# Patient Record
Sex: MALE | Race: BLACK OR AFRICAN AMERICAN | HISPANIC OR LATINO | Employment: OTHER | URBAN - METROPOLITAN AREA
[De-identification: names, ages, dates, MRNs, and addresses within clinical notes are randomized per-mention and may not be internally consistent; named-entity substitution may affect disease eponyms.]

---

## 2018-12-07 ENCOUNTER — TELEPHONE (OUTPATIENT)
Dept: FAMILY MEDICINE CLINIC | Facility: CLINIC | Age: 63
End: 2018-12-07

## 2018-12-19 ENCOUNTER — OFFICE VISIT (OUTPATIENT)
Dept: FAMILY MEDICINE CLINIC | Facility: CLINIC | Age: 63
End: 2018-12-19
Payer: COMMERCIAL

## 2018-12-19 VITALS
DIASTOLIC BLOOD PRESSURE: 98 MMHG | HEART RATE: 92 BPM | TEMPERATURE: 97.1 F | SYSTOLIC BLOOD PRESSURE: 138 MMHG | RESPIRATION RATE: 16 BRPM | WEIGHT: 165 LBS | OXYGEN SATURATION: 95 %

## 2018-12-19 DIAGNOSIS — Z23 ENCOUNTER FOR VACCINATION: ICD-10-CM

## 2018-12-19 DIAGNOSIS — I10 ESSENTIAL HYPERTENSION: ICD-10-CM

## 2018-12-19 DIAGNOSIS — R21 RASH: ICD-10-CM

## 2018-12-19 DIAGNOSIS — E78.2 MIXED HYPERLIPIDEMIA: ICD-10-CM

## 2018-12-19 DIAGNOSIS — Z72.0 TOBACCO USER: Primary | ICD-10-CM

## 2018-12-19 DIAGNOSIS — Z11.4 ENCOUNTER FOR HIV (HUMAN IMMUNODEFICIENCY VIRUS) TEST: ICD-10-CM

## 2018-12-19 PROBLEM — F14.20 COCAINE DEPENDENCE, CONTINUOUS ABUSE (HCC): Status: RESOLVED | Noted: 2018-02-27 | Resolved: 2018-12-19

## 2018-12-19 PROBLEM — I50.20 SYSTOLIC HEART FAILURE (HCC): Status: ACTIVE | Noted: 2018-02-27

## 2018-12-19 PROBLEM — F14.20 COCAINE DEPENDENCE, CONTINUOUS ABUSE (HCC): Status: ACTIVE | Noted: 2018-02-27

## 2018-12-19 PROCEDURE — 90682 RIV4 VACC RECOMBINANT DNA IM: CPT | Performed by: FAMILY MEDICINE

## 2018-12-19 PROCEDURE — 90715 TDAP VACCINE 7 YRS/> IM: CPT | Performed by: FAMILY MEDICINE

## 2018-12-19 PROCEDURE — 99406 BEHAV CHNG SMOKING 3-10 MIN: CPT | Performed by: FAMILY MEDICINE

## 2018-12-19 PROCEDURE — 90472 IMMUNIZATION ADMIN EACH ADD: CPT | Performed by: FAMILY MEDICINE

## 2018-12-19 PROCEDURE — 90471 IMMUNIZATION ADMIN: CPT | Performed by: FAMILY MEDICINE

## 2018-12-19 PROCEDURE — 99213 OFFICE O/P EST LOW 20 MIN: CPT | Performed by: FAMILY MEDICINE

## 2018-12-19 RX ORDER — NICOTINE 21 MG/24HR
1 PATCH, TRANSDERMAL 24 HOURS TRANSDERMAL EVERY 24 HOURS
Qty: 28 PATCH | Refills: 3 | Status: SHIPPED | OUTPATIENT
Start: 2018-12-19 | End: 2021-02-04

## 2018-12-19 RX ORDER — ATORVASTATIN CALCIUM 40 MG/1
TABLET, FILM COATED ORAL EVERY 24 HOURS
COMMUNITY
Start: 2018-02-27 | End: 2018-12-19 | Stop reason: SDUPTHER

## 2018-12-19 RX ORDER — AMMONIUM LACTATE 12 G/100G
LOTION TOPICAL 2 TIMES DAILY PRN
Qty: 400 G | Refills: 0 | Status: SHIPPED | OUTPATIENT
Start: 2018-12-19 | End: 2019-01-21 | Stop reason: SDUPTHER

## 2018-12-19 RX ORDER — LISINOPRIL 20 MG/1
20 TABLET ORAL DAILY
Qty: 90 TABLET | Refills: 1 | Status: SHIPPED | OUTPATIENT
Start: 2018-12-19 | End: 2019-02-26 | Stop reason: SDUPTHER

## 2018-12-19 RX ORDER — LISINOPRIL 20 MG/1
TABLET ORAL EVERY 24 HOURS
COMMUNITY
Start: 2018-02-27 | End: 2018-12-19 | Stop reason: SDUPTHER

## 2018-12-19 RX ORDER — ATORVASTATIN CALCIUM 40 MG/1
40 TABLET, FILM COATED ORAL DAILY
Qty: 90 TABLET | Refills: 1 | Status: SHIPPED | OUTPATIENT
Start: 2018-12-19 | End: 2019-03-22 | Stop reason: SDUPTHER

## 2018-12-19 NOTE — ASSESSMENT & PLAN NOTE
Patient is ready to cut down on smoking currently smokes 4 cigarettes a day will give patient nicotine patches 14 mg Q 24 hr and nicotine gum to assist patient in quitting smoking  The benefits of quitting smoking were discussed with the patient he is aware

## 2018-12-19 NOTE — ASSESSMENT & PLAN NOTE
As per USPSTF Guidelines will obtain one time screening for HIV with HIV antibodies  Patient is aware and agree with above

## 2018-12-19 NOTE — ASSESSMENT & PLAN NOTE
- the rash consistent with excessive dryness of the skin and excoriations noted most likely due to patient's scratching the skin  - will prescribe ammonium lactate to apply liberally twice daily p r n  Finn Simple

## 2018-12-19 NOTE — ASSESSMENT & PLAN NOTE
Patient's blood pressure is controlled even though patient has not taken medication in 2 weeks  Will cautiously restart lisinopril and patient is to return to the clinic in 1 week for blood pressure check with nursing  Patient's blood pressure goal should be less than 130/80 according to ACC/AHA guidelines considering patient has cardiovascular disease  Patient denies any side effects with medications  Patient educated on the importance of weight loss, and appropriate dieting  Patient admits to be compliant with medications

## 2018-12-19 NOTE — PROGRESS NOTES
Assessment/Plan:    Essential hypertension  Patient's blood pressure is controlled even though patient has not taken medication in 2 weeks  Will cautiously restart lisinopril and patient is to return to the clinic in 1 week for blood pressure check with nursing  Patient's blood pressure goal should be less than 130/80 according to ACC/AHA guidelines considering patient has cardiovascular disease  Patient denies any side effects with medications  Patient educated on the importance of weight loss, and appropriate dieting  Patient admits to be compliant with medications  Mixed hyperlipidemia  Will repeat lipid profile if applicable  Patient is on appropriate anti lipid medication  Discussed and emphasized the importance of diet and exercise  Patient acknowledges that they understood what was discussed  Encounter for HIV (human immunodeficiency virus) test  As per USPSTF Guidelines will obtain one time screening for HIV with HIV antibodies  Patient is aware and agree with above  Encounter for vaccination  Patient received flu vaccine and Tdap today  Tobacco user  Patient is ready to cut down on smoking currently smokes 4 cigarettes a day will give patient nicotine patches 14 mg Q 24 hr and nicotine gum to assist patient in quitting smoking  The benefits of quitting smoking were discussed with the patient he is aware  Rash  - the rash consistent with excessive dryness of the skin and excoriations noted most likely due to patient's scratching the skin  - will prescribe ammonium lactate to apply liberally twice daily juhi r rasta Longoria Diagnoses and all orders for this visit:    Tobacco user  -     nicotine (NICODERM CQ) 14 mg/24hr TD 24 hr patch; Place 1 patch on the skin every 24 hours  -     nicotine polacrilex (NICORETTE) 2 mg gum; Chew 1 each (2 mg total) as needed for smoking cessation    Mixed hyperlipidemia  -     atorvastatin (LIPITOR) 40 mg tablet;  Take 1 tablet (40 mg total) by mouth daily  -     Lipid panel; Future    Essential hypertension  -     aspirin 81 MG tablet; Take 1 tablet (81 mg total) by mouth once for 1 dose  -     lisinopril (ZESTRIL) 20 mg tablet; Take 1 tablet (20 mg total) by mouth daily  -     Basic metabolic panel; Future    Encounter for vaccination  -     PREFERRED: influenza vaccine, [id number], quadrivalent, recombinant, PF, 0 5 mL, for patients 18 yr+ (FLUBLOK)  -     TDAP VACCINE GREATER THAN OR EQUAL TO 6YO IM    Rash  -     ammonium lactate (LAC-HYDRIN) 12 % lotion; Apply topically 2 (two) times a day as needed for dry skin    Encounter for HIV (human immunodeficiency virus) test  -     HIV 1/2 AG-AB combo; Future    Other orders  -     Discontinue: lisinopril (ZESTRIL) 20 mg tablet; every 24 hours  -     Discontinue: atorvastatin (LIPITOR) 40 mg tablet; every 24 hours  -     Discontinue: aspirin 81 MG tablet; every 24 hours          Subjective:      Patient ID: Luara Allison is a 59 y o  male  This is a pleasant 78-year-old male with known past medical history of systolic CHF, hypertension, hyperlipidemia and tobacco abuse who presents to the clinic for sick visit for medication refills  Patient reports he was out of the country in the [facility] for 5 months as his father is very ill  Patient does admit that he ran out of his blood pressure medications several months ago and was taking his sisters blood pressure medication  Since his return to the [place] patient reports that he has not taken any type of blood pressure medication for the last 2 weeks  Patient does state that for the past 3 months he has had a rash on his torso that he describes as itching and makes him scratch a lot  He states that this rash may be due to the type of water he was using to shower when he was away in the [facility]                 Currently denies any chest pain or shortness of breath or leg swelling and would like refills on all of his medications  The following portions of the patient's history were reviewed and updated as appropriate:   He  has no past medical history on file  He   Patient Active Problem List    Diagnosis Date Noted    Encounter for vaccination 12/19/2018    Rash 12/19/2018    Encounter for HIV (human immunodeficiency virus) test 12/19/2018    Essential hypertension 02/27/2018    Mixed hyperlipidemia 02/20/0706    Systolic heart failure ([facility] [address] ) 02/27/2018    Tobacco user 02/27/2018     He  has no past surgical history on file  His family history is not on file  He  reports that he has been smoking  He uses smokeless tobacco  His alcohol and drug histories are not on file  Current Outpatient Prescriptions   Medication Sig Dispense Refill    aspirin 81 MG tablet Take 1 tablet (81 mg total) by mouth once for 1 dose 90 tablet 1    atorvastatin (LIPITOR) 40 mg tablet Take 1 tablet (40 mg total) by mouth daily 90 tablet 1    lisinopril (ZESTRIL) 20 mg tablet Take 1 tablet (20 mg total) by mouth daily 90 tablet 1    ammonium lactate (LAC-HYDRIN) 12 % lotion Apply topically 2 (two) times a day as needed for dry skin 400 g 0    nicotine (NICODERM CQ) 14 mg/24hr TD 24 hr patch Place 1 patch on the skin every 24 hours 28 patch 3    nicotine polacrilex (NICORETTE) 2 mg gum Chew 1 each (2 mg total) as needed for smoking cessation 100 each 1     No current facility-administered medications for this visit       Review of Systems   Constitutional: Negative for fatigue and fever  HENT: Negative for congestion and sore throat  Eyes: Negative for visual disturbance  Respiratory: Negative for cough, shortness of breath and wheezing  Cardiovascular: Negative for chest pain, palpitations and leg swelling  Gastrointestinal: Negative for abdominal pain, anal bleeding, constipation, diarrhea, nausea and vomiting  Endocrine: Negative for cold intolerance and heat intolerance     Musculoskeletal: Negative for arthralgias and joint swelling  Skin: Positive for rash  Negative for color change  Neurological: Negative for dizziness, seizures, syncope, weakness and light-headedness  Psychiatric/Behavioral: Negative for agitation, confusion, sleep disturbance and suicidal ideas  Objective:      /98 (BP Location: Left arm, Patient Position: Sitting, Cuff Size: Adult)   Pulse 92   Temp (!) 97 1 °F (36 2 °C) (Temporal)   Resp 16   Wt 74 8 kg (165 lb)   SpO2 95%          Physical Exam   Constitutional: He is oriented to person, place, and time  He appears well-developed and well-nourished  HENT:   Head: Normocephalic and atraumatic  Eyes: Pupils are equal, round, and reactive to light  Conjunctivae and EOM are normal    Neck: Normal range of motion  Neck supple  No JVD present  Cardiovascular: Normal rate, regular rhythm, normal heart sounds and intact distal pulses  Exam reveals no gallop and no friction rub  No murmur heard  Pulmonary/Chest: Effort normal and breath sounds normal  No respiratory distress  He has no wheezes  Abdominal: Soft  Bowel sounds are normal  He exhibits no distension  There is no tenderness  Musculoskeletal: Normal range of motion  Neurological: He is alert and oriented to person, place, and time  He has normal reflexes  Skin: Skin is warm and dry  Rash noted  Very dry skin noted on the torso with multiple excoriations most likely from scratching     Psychiatric: His behavior is normal  Judgment and thought content normal

## 2019-01-11 ENCOUNTER — CLINICAL SUPPORT (OUTPATIENT)
Dept: FAMILY MEDICINE CLINIC | Facility: CLINIC | Age: 64
End: 2019-01-11

## 2019-01-11 ENCOUNTER — TRANSCRIBE ORDERS (OUTPATIENT)
Dept: ADMINISTRATIVE | Facility: HOSPITAL | Age: 64
End: 2019-01-11

## 2019-01-11 ENCOUNTER — APPOINTMENT (OUTPATIENT)
Dept: LAB | Facility: HOSPITAL | Age: 64
End: 2019-01-11
Payer: COMMERCIAL

## 2019-01-11 VITALS — DIASTOLIC BLOOD PRESSURE: 82 MMHG | SYSTOLIC BLOOD PRESSURE: 128 MMHG

## 2019-01-11 DIAGNOSIS — Z11.4 ENCOUNTER FOR HIV (HUMAN IMMUNODEFICIENCY VIRUS) TEST: ICD-10-CM

## 2019-01-11 DIAGNOSIS — I10 ESSENTIAL HYPERTENSION: ICD-10-CM

## 2019-01-11 DIAGNOSIS — I10 HYPERTENSION, UNSPECIFIED TYPE: Primary | ICD-10-CM

## 2019-01-11 DIAGNOSIS — E78.2 MIXED HYPERLIPIDEMIA: ICD-10-CM

## 2019-01-11 LAB
ANION GAP SERPL CALCULATED.3IONS-SCNC: 5 MMOL/L (ref 5–14)
BUN SERPL-MCNC: 11 MG/DL (ref 5–25)
CALCIUM SERPL-MCNC: 9.5 MG/DL (ref 8.4–10.2)
CHLORIDE SERPL-SCNC: 102 MMOL/L (ref 97–108)
CHOLEST SERPL-MCNC: 147 MG/DL
CO2 SERPL-SCNC: 31 MMOL/L (ref 22–30)
CREAT SERPL-MCNC: 0.76 MG/DL (ref 0.7–1.5)
GFR SERPL CREATININE-BSD FRML MDRD: 98 ML/MIN/1.73SQ M
GLUCOSE P FAST SERPL-MCNC: 151 MG/DL (ref 70–99)
HDLC SERPL-MCNC: 37 MG/DL (ref 40–59)
LDLC SERPL CALC-MCNC: 82 MG/DL
NONHDLC SERPL-MCNC: 110 MG/DL
POTASSIUM SERPL-SCNC: 4.1 MMOL/L (ref 3.6–5)
SODIUM SERPL-SCNC: 138 MMOL/L (ref 137–147)
TRIGL SERPL-MCNC: 142 MG/DL

## 2019-01-11 PROCEDURE — 80048 BASIC METABOLIC PNL TOTAL CA: CPT

## 2019-01-11 PROCEDURE — 36415 COLL VENOUS BLD VENIPUNCTURE: CPT

## 2019-01-11 PROCEDURE — 80061 LIPID PANEL: CPT

## 2019-01-11 PROCEDURE — 87389 HIV-1 AG W/HIV-1&-2 AB AG IA: CPT

## 2019-01-14 LAB — HIV 1+2 AB+HIV1 P24 AG SERPL QL IA: NORMAL

## 2019-01-21 DIAGNOSIS — R21 RASH: ICD-10-CM

## 2019-01-21 RX ORDER — AMMONIUM LACTATE 12 G/100G
LOTION TOPICAL
Qty: 396 G | Refills: 0 | Status: SHIPPED | OUTPATIENT
Start: 2019-01-21 | End: 2020-08-20

## 2019-02-26 DIAGNOSIS — I10 ESSENTIAL HYPERTENSION: ICD-10-CM

## 2019-02-26 RX ORDER — LISINOPRIL 20 MG/1
TABLET ORAL
Qty: 30 TABLET | Refills: 0 | Status: SHIPPED | OUTPATIENT
Start: 2019-02-26 | End: 2019-03-28 | Stop reason: SDUPTHER

## 2019-03-20 PROBLEM — Z11.4 ENCOUNTER FOR HIV (HUMAN IMMUNODEFICIENCY VIRUS) TEST: Status: RESOLVED | Noted: 2018-12-19 | Resolved: 2019-03-20

## 2019-03-20 PROBLEM — R21 RASH: Status: RESOLVED | Noted: 2018-12-19 | Resolved: 2019-03-20

## 2019-03-20 PROBLEM — Z23 ENCOUNTER FOR VACCINATION: Status: RESOLVED | Noted: 2018-12-19 | Resolved: 2019-03-20

## 2019-03-20 PROBLEM — R73.9 ELEVATED SERUM GLUCOSE: Status: ACTIVE | Noted: 2019-03-20

## 2019-03-20 PROBLEM — Z11.59 SCREENING FOR VIRAL DISEASE: Status: ACTIVE | Noted: 2019-03-20

## 2019-03-22 ENCOUNTER — TELEPHONE (OUTPATIENT)
Dept: FAMILY MEDICINE CLINIC | Facility: CLINIC | Age: 64
End: 2019-03-22

## 2019-03-22 DIAGNOSIS — E78.2 MIXED HYPERLIPIDEMIA: ICD-10-CM

## 2019-03-22 RX ORDER — ATORVASTATIN CALCIUM 40 MG/1
40 TABLET, FILM COATED ORAL DAILY
Qty: 30 TABLET | Refills: 3 | Status: SHIPPED | OUTPATIENT
Start: 2019-03-22 | End: 2020-08-20 | Stop reason: SDUPTHER

## 2019-03-22 NOTE — TELEPHONE ENCOUNTER
I just spoke with the pharmacy and it is covered  Refills are waiting at the pharmacy for him  Please call him and inform him     Thanks,   Ananya

## 2019-03-28 DIAGNOSIS — I10 ESSENTIAL HYPERTENSION: ICD-10-CM

## 2019-03-28 RX ORDER — LISINOPRIL 20 MG/1
TABLET ORAL
Qty: 30 TABLET | Refills: 0 | Status: SHIPPED | OUTPATIENT
Start: 2019-03-28 | End: 2019-04-30 | Stop reason: SDUPTHER

## 2019-03-28 RX ORDER — ASPIRIN 81 MG
TABLET, DELAYED RELEASE (ENTERIC COATED) ORAL
Qty: 30 TABLET | Refills: 0 | Status: SHIPPED | OUTPATIENT
Start: 2019-03-28 | End: 2019-04-30 | Stop reason: SDUPTHER

## 2019-04-29 DIAGNOSIS — I10 ESSENTIAL HYPERTENSION: ICD-10-CM

## 2019-04-30 RX ORDER — ASPIRIN 81 MG/1
81 TABLET ORAL DAILY
Qty: 30 TABLET | Refills: 3 | Status: SHIPPED | OUTPATIENT
Start: 2019-04-30 | End: 2019-10-24 | Stop reason: SDUPTHER

## 2019-04-30 RX ORDER — LISINOPRIL 20 MG/1
TABLET ORAL
Qty: 30 TABLET | Refills: 0 | OUTPATIENT
Start: 2019-04-30

## 2019-04-30 RX ORDER — ASPIRIN 81 MG
TABLET, DELAYED RELEASE (ENTERIC COATED) ORAL
Qty: 30 TABLET | Refills: 0 | OUTPATIENT
Start: 2019-04-30

## 2019-04-30 RX ORDER — LISINOPRIL 20 MG/1
20 TABLET ORAL DAILY
Qty: 30 TABLET | Refills: 3 | Status: SHIPPED | OUTPATIENT
Start: 2019-04-30 | End: 2019-10-24 | Stop reason: SDUPTHER

## 2019-06-06 ENCOUNTER — OFFICE VISIT (OUTPATIENT)
Dept: FAMILY MEDICINE CLINIC | Facility: CLINIC | Age: 64
End: 2019-06-06

## 2019-06-06 VITALS
HEART RATE: 86 BPM | WEIGHT: 158 LBS | OXYGEN SATURATION: 96 % | RESPIRATION RATE: 19 BRPM | TEMPERATURE: 97.8 F | SYSTOLIC BLOOD PRESSURE: 100 MMHG | DIASTOLIC BLOOD PRESSURE: 70 MMHG

## 2019-06-06 DIAGNOSIS — R73.9 ELEVATED SERUM GLUCOSE: ICD-10-CM

## 2019-06-06 DIAGNOSIS — E78.2 MIXED HYPERLIPIDEMIA: ICD-10-CM

## 2019-06-06 DIAGNOSIS — E11.9 TYPE 2 DIABETES MELLITUS WITHOUT COMPLICATION, WITHOUT LONG-TERM CURRENT USE OF INSULIN (HCC): ICD-10-CM

## 2019-06-06 DIAGNOSIS — I10 HTN, GOAL BELOW 130/80: Primary | ICD-10-CM

## 2019-06-06 DIAGNOSIS — Z72.0 TOBACCO USER: ICD-10-CM

## 2019-06-06 PROBLEM — Z11.59 SCREENING FOR VIRAL DISEASE: Status: RESOLVED | Noted: 2019-03-20 | Resolved: 2019-06-06

## 2019-06-06 LAB — SL AMB POCT HEMOGLOBIN AIC: 7.8 (ref ?–6.5)

## 2019-06-06 PROCEDURE — 83036 HEMOGLOBIN GLYCOSYLATED A1C: CPT | Performed by: FAMILY MEDICINE

## 2019-06-06 PROCEDURE — 99406 BEHAV CHNG SMOKING 3-10 MIN: CPT | Performed by: FAMILY MEDICINE

## 2019-06-06 PROCEDURE — 3725F SCREEN DEPRESSION PERFORMED: CPT | Performed by: FAMILY MEDICINE

## 2019-06-06 PROCEDURE — 99213 OFFICE O/P EST LOW 20 MIN: CPT | Performed by: FAMILY MEDICINE

## 2019-10-17 ENCOUNTER — OFFICE VISIT (OUTPATIENT)
Dept: FAMILY MEDICINE CLINIC | Facility: CLINIC | Age: 64
End: 2019-10-17

## 2019-10-17 VITALS
OXYGEN SATURATION: 99 % | RESPIRATION RATE: 20 BRPM | SYSTOLIC BLOOD PRESSURE: 120 MMHG | BODY MASS INDEX: 29.63 KG/M2 | WEIGHT: 161 LBS | DIASTOLIC BLOOD PRESSURE: 80 MMHG | TEMPERATURE: 96.4 F | HEIGHT: 62 IN | HEART RATE: 92 BPM

## 2019-10-17 DIAGNOSIS — I50.20 SYSTOLIC HEART FAILURE, UNSPECIFIED HF CHRONICITY (HCC): ICD-10-CM

## 2019-10-17 DIAGNOSIS — Z00.00 HEALTHCARE MAINTENANCE: ICD-10-CM

## 2019-10-17 DIAGNOSIS — E11.9 TYPE 2 DIABETES MELLITUS WITHOUT COMPLICATION, WITHOUT LONG-TERM CURRENT USE OF INSULIN (HCC): ICD-10-CM

## 2019-10-17 DIAGNOSIS — I10 HTN, GOAL BELOW 130/80: Primary | ICD-10-CM

## 2019-10-17 DIAGNOSIS — Z72.0 TOBACCO USER: ICD-10-CM

## 2019-10-17 DIAGNOSIS — E78.2 MIXED HYPERLIPIDEMIA: ICD-10-CM

## 2019-10-17 LAB — SL AMB POCT HEMOGLOBIN AIC: 7.7 (ref ?–6.5)

## 2019-10-17 PROCEDURE — 3074F SYST BP LT 130 MM HG: CPT | Performed by: PHYSICIAN ASSISTANT

## 2019-10-17 PROCEDURE — 3008F BODY MASS INDEX DOCD: CPT | Performed by: PHYSICIAN ASSISTANT

## 2019-10-17 PROCEDURE — 99214 OFFICE O/P EST MOD 30 MIN: CPT | Performed by: PHYSICIAN ASSISTANT

## 2019-10-17 PROCEDURE — 3079F DIAST BP 80-89 MM HG: CPT | Performed by: PHYSICIAN ASSISTANT

## 2019-10-17 PROCEDURE — 3051F HG A1C>EQUAL 7.0%<8.0%: CPT | Performed by: PHYSICIAN ASSISTANT

## 2019-10-17 PROCEDURE — 83036 HEMOGLOBIN GLYCOSYLATED A1C: CPT | Performed by: PHYSICIAN ASSISTANT

## 2019-10-17 NOTE — LETTER
October 17, 2019     Patient: Nasim Acosta   YOB: 1955   Date of Visit: 10/17/2019       To Whom it May Concern:    Nasim Acosta is under my professional care  He was seen in my office on 10/17/2019  Patient has past medical history of high blood pressure, high cholesterol, and type 2 diabetes mellitus  If you have any questions or concerns, please don't hesitate to call           Sincerely,          Lilian Saldana PA-C        CC: No Recipients

## 2019-10-17 NOTE — PROGRESS NOTES
Assessment/Plan:  - Patient is due for updated blood work, however patient does not have insurance at this time  Blood work has been ordered, but advised patient to wait until he receives insurance again before he has it completed  - Provided patient letter indicating his medical conditions  Hepatitis C Screening  - As per USPSTF guidelines, patients born between [birth year]120-[birth year]65 should be screened for hepatitis C  Therefore will screen for hepatitis C and obtain a hep C antibody  Patient is aware and agrees with above  Hypertension  - Continue lisinopril 20 mg once daily  - Currently blood pressure is controlled at this time  Reviewed BP target goal with patient  - Continue to maintain healthy balanced diet with focus on low salt intake  Limit alcohol intake  - Advised to exercise at least 30 minutes a day for at least 5 days out of the week  Type 2 Diabetes Mellitus  - POCT HgbA1c in office today is 7 7  This has decreased from 7 8 in June 2019  Since patient currently does not have insurance, will continue with metformin 500 mg twice daily  Advised patient to call the office when he does receive his insurance back  At that time, will then increase medication from 500 mg to 1000 mg twice daily  Advised to continue following diabetic diet with focus on low intake of carbohydrates and sugars  Tobacco use  - Currently smoking about 3 cigarettes a day  Patient is happy using the nicotine patches  Advised to continue wearing the patches and to try to cut down on the number cigarette smoking daily prior to next appointment  Hyperlipidemia  - Continue atorvastatin 40 mg once daily  Diagnoses and all orders for this visit:    HTN, goal below 130/80    Healthcare maintenance  -     CBC and differential; Future  -     Comprehensive metabolic panel; Future  -     Lipid panel; Future  -     TSH, 3rd generation with Free T4 reflex;  Future  -     Cancel: Hemoglobin A1C; Future  -     Hepatitis C antibody; Future    Type 2 diabetes mellitus without complication, without long-term current use of insulin (HCC)    Tobacco user    Mixed hyperlipidemia    Systolic heart failure, unspecified HF chronicity ([facility] [address] )    Other orders  -     Cancel: Ambulatory referral to Gastroenterology; Future        All of patients questions were answered  Patient understands and agrees with the above plan  Return in about 3 months (around 1/17/2020) for Next scheduled follow up with PCP, Dr Miranda Zimmer Sa, PA-C  10/17/19  FP Jaye           Subjective:     Patient ID: Bay Cordova  is a 62 y o  male with known PMH of   Hypertension, hyperlipidemia, systolic heart failure, type 2 diabetes mellitus, tobacco user who presents today in office for  Follow-up of chronic conditions  - Patient is a 62 y o  male who presents today for  Follow-up of chronic conditions  Overall, patient notes he is doing well and has no new complaints today  Patient notes he did go to the social security doctor in he was told that he needs a letter from his primary care provider indicating his medical conditions  Patient also has a form today which she would like to be completed for disability  Of note, patient notes his insurance was recently cut off  Patient notes he is currently trying to re-obtain his insurance  Patient does not wish to have refills of his medications until he receives insurance again  Hypertension: Currently taking lisinopril 20 mg once daily  Patient denies any headaches, dizziness, chest pain, or lower leg swelling  Type 2 diabetes mellitus:  Currently taking metformin 500 mg twice daily  Patient is not checking his sugars daily  Patient notes he does eat a lot of carbohydrates daily  Patient denies any numbness or tingling of his feet  Hyperlipidemia: Currently taking atorvastatin 40 mg once daily  Tobacco user: Currently using nicotine patch 14 mg   Patient notes he thinks the patches helping him  Patient is currently smoking about 3 cigarettes a day  The following portions of the patient's history were reviewed and updated as appropriate: allergies, current medications, past family history, past medical history, past social history, past surgical history and problem list         Review of Systems   Constitutional: Negative for appetite change, fatigue and fever  HENT: Negative for congestion, ear pain, postnasal drip, rhinorrhea and sore throat  Eyes: Negative for pain  Respiratory: Negative for cough, chest tightness and shortness of breath  Cardiovascular: Negative for chest pain, palpitations and leg swelling  Gastrointestinal: Negative for abdominal pain, constipation, diarrhea, nausea and vomiting  Genitourinary: Negative for difficulty urinating and dysuria  Musculoskeletal: Negative for arthralgias and back pain  Skin: Negative for rash  Neurological: Negative for dizziness, numbness and headaches  Psychiatric/Behavioral: Negative for behavioral problems  The patient is not nervous/anxious  Objective:   Vitals:    10/17/19 0809   BP: 120/80   Pulse: 92   Resp: 20   Temp: (!) 96 4 °F (35 8 °C)   TempSrc: Skin   SpO2: 99%   Weight: 73 kg (161 lb)   Height: 5' 2" (1 575 m)         Physical Exam   Constitutional: He is oriented to person, place, and time  He appears well-developed and well-nourished  No distress  HENT:   Head: Normocephalic and atraumatic  Right Ear: External ear normal    Left Ear: External ear normal    Nose: Nose normal    Mouth/Throat: Mucous membranes are normal    Eyes: Pupils are equal, round, and reactive to light  Conjunctivae and EOM are normal    Neck: Normal range of motion  Neck supple  Cardiovascular: Normal rate, regular rhythm, normal heart sounds and intact distal pulses  No murmur heard  Pulmonary/Chest: Effort normal and breath sounds normal  He has no wheezes  Musculoskeletal: Normal range of motion  Neurological: He is alert and oriented to person, place, and time  Skin: Skin is warm and dry  Psychiatric: He has a normal mood and affect  His speech is normal and behavior is normal    Nursing note and vitals reviewed  - Utilized [facility] for translation

## 2019-10-24 DIAGNOSIS — I10 ESSENTIAL HYPERTENSION: ICD-10-CM

## 2019-10-24 PROCEDURE — 4010F ACE/ARB THERAPY RXD/TAKEN: CPT | Performed by: FAMILY MEDICINE

## 2019-10-24 RX ORDER — LISINOPRIL 20 MG/1
20 TABLET ORAL DAILY
Qty: 30 TABLET | Refills: 3 | Status: SHIPPED | OUTPATIENT
Start: 2019-10-24 | End: 2020-03-13

## 2019-10-24 RX ORDER — ASPIRIN 81 MG/1
81 TABLET ORAL DAILY
Qty: 30 TABLET | Refills: 3 | Status: SHIPPED | OUTPATIENT
Start: 2019-10-24 | End: 2020-03-13

## 2019-10-29 DIAGNOSIS — R21 RASH: ICD-10-CM

## 2019-10-30 RX ORDER — AMMONIUM LACTATE 12 G/100G
LOTION TOPICAL
Qty: 396 G | Refills: 0 | Status: SHIPPED | OUTPATIENT
Start: 2019-10-30 | End: 2020-08-20

## 2019-11-28 ENCOUNTER — APPOINTMENT (EMERGENCY)
Dept: RADIOLOGY | Facility: HOSPITAL | Age: 64
End: 2019-11-28
Payer: COMMERCIAL

## 2019-11-28 ENCOUNTER — HOSPITAL ENCOUNTER (EMERGENCY)
Facility: HOSPITAL | Age: 64
Discharge: HOME | End: 2019-11-28
Attending: EMERGENCY MEDICINE | Admitting: EMERGENCY MEDICINE
Payer: COMMERCIAL

## 2019-11-28 VITALS
RESPIRATION RATE: 18 BRPM | DIASTOLIC BLOOD PRESSURE: 85 MMHG | HEART RATE: 84 BPM | WEIGHT: 162.7 LBS | SYSTOLIC BLOOD PRESSURE: 153 MMHG | TEMPERATURE: 96.9 F | OXYGEN SATURATION: 98 % | BODY MASS INDEX: 29.76 KG/M2

## 2019-11-28 DIAGNOSIS — T14.8XXA CONTUSION: Primary | ICD-10-CM

## 2019-11-28 PROCEDURE — 99283 EMERGENCY DEPT VISIT LOW MDM: CPT

## 2019-11-28 PROCEDURE — 71046 X-RAY EXAM CHEST 2 VIEWS: CPT

## 2019-11-28 PROCEDURE — 99284 EMERGENCY DEPT VISIT MOD MDM: CPT | Performed by: EMERGENCY MEDICINE

## 2019-11-28 RX ORDER — IBUPROFEN 600 MG/1
600 TABLET ORAL EVERY 6 HOURS PRN
Qty: 12 TABLET | Refills: 0 | Status: SHIPPED | OUTPATIENT
Start: 2019-11-28 | End: 2020-08-20

## 2019-11-28 RX ORDER — METHOCARBAMOL 500 MG/1
750 TABLET, FILM COATED ORAL ONCE
Status: COMPLETED | OUTPATIENT
Start: 2019-11-28 | End: 2019-11-28

## 2019-11-28 RX ORDER — LIDOCAINE 50 MG/G
1 PATCH TOPICAL ONCE
Status: DISCONTINUED | OUTPATIENT
Start: 2019-11-28 | End: 2019-11-28 | Stop reason: HOSPADM

## 2019-11-28 RX ORDER — IBUPROFEN 600 MG/1
600 TABLET ORAL ONCE
Status: COMPLETED | OUTPATIENT
Start: 2019-11-28 | End: 2019-11-28

## 2019-11-28 RX ADMIN — IBUPROFEN 600 MG: 600 TABLET, FILM COATED ORAL at 15:25

## 2019-11-28 RX ADMIN — LIDOCAINE 1 PATCH: 50 PATCH TOPICAL at 15:23

## 2019-11-28 RX ADMIN — METHOCARBAMOL TABLETS 750 MG: 500 TABLET, COATED ORAL at 15:25

## 2019-11-28 NOTE — ED PROVIDER NOTES
History  Chief Complaint   Patient presents with   Paola Thomson     reports tripping and falling 2 days ago, c/o right sided rib pain     70-year-old male presents for evaluation of mechanical fall 3 days ago and her right-sided rib pain that started the day after  Patient states he was walking, tripped from standing height and fell onto the ground  Denies head trauma or loss of consciousness  Denies any other injuries  Pain is most prominent on right lateral ribs 6-9  No pain medications prior to arrival   Pain is worse with palpation and movement  Unchanged with breathing  Prior to Admission Medications   Prescriptions Last Dose Informant Patient Reported? Taking?   ammonium lactate (LAC-HYDRIN) 12 % lotion   No No   Sig: APPLY TO AFFECTED AREA 2 TIMES A DAY AS NEEDED FOR DRY SKIN   ammonium lactate (LAC-HYDRIN) 12 % lotion   No No   Sig: APPLY TO AFFECTED AREA 2 TIMES A DAY AS NEEDED FOR DRY SKIN   aspirin (ASPIRIN LOW DOSE) 81 mg EC tablet   No No   Sig: Take 1 tablet (81 mg total) by mouth daily   atorvastatin (LIPITOR) 40 mg tablet   No No   Sig: Take 1 tablet (40 mg total) by mouth daily   lisinopril (ZESTRIL) 20 mg tablet   No No   Sig: Take 1 tablet (20 mg total) by mouth daily   metFORMIN (GLUCOPHAGE) 500 mg tablet   No No   Sig: Take 1 tablet (500 mg total) by mouth 2 (two) times a day with meals   nicotine (NICODERM CQ) 14 mg/24hr TD 24 hr patch   No No   Sig: Place 1 patch on the skin every 24 hours   nicotine polacrilex (NICORETTE) 2 mg gum   No No   Sig: Chew 1 each (2 mg total) as needed for smoking cessation      Facility-Administered Medications: None       Past Medical History:   Diagnosis Date    HLD (hyperlipidemia)     Hypertension        History reviewed  No pertinent surgical history  History reviewed  No pertinent family history  I have reviewed and agree with the history as documented      Social History     Tobacco Use    Smoking status: Current Every Day Smoker Packs/day: 0 50     Types: Cigarettes    Smokeless tobacco: Current User   Substance Use Topics    Alcohol use: Not Currently    Drug use: Not Currently        Review of Systems   Constitutional: Negative for chills, diaphoresis and fever  HENT: Negative for congestion and rhinorrhea  Eyes: Negative for pain and visual disturbance  Respiratory: Negative for cough, shortness of breath and wheezing  Cardiovascular: Negative for chest pain and leg swelling  Gastrointestinal: Negative for abdominal pain, diarrhea, nausea and vomiting  Genitourinary: Negative for difficulty urinating, dysuria, frequency and urgency  Musculoskeletal: Negative for back pain and neck pain  Right-sided rib pain   Skin: Negative for color change and rash  Neurological: Negative for syncope, numbness and headaches  All other systems reviewed and are negative  Physical Exam  Physical Exam   Constitutional: He is oriented to person, place, and time  He appears well-developed and well-nourished  HENT:   Head: Normocephalic and atraumatic  Eyes: Conjunctivae and EOM are normal    Neck: Normal range of motion  Neck supple  No CT L-spine tenderness   Cardiovascular: Normal rate and regular rhythm  Pulmonary/Chest: Effort normal and breath sounds normal  No respiratory distress  He has no wheezes  He has no rales  Abdominal: Soft  Bowel sounds are normal  There is no tenderness  There is no guarding  Abdomen is soft, nontender   Musculoskeletal: Normal range of motion  He exhibits tenderness  He exhibits no edema  Point tenderness on right lateral ribs 6-9  No overlying skin changes, ecchymosis  Neurological: He is alert and oriented to person, place, and time  No cranial nerve deficit  Skin: Skin is warm  No erythema  Psychiatric: He has a normal mood and affect  His behavior is normal    Nursing note and vitals reviewed        Vital Signs  ED Triage Vitals   Temperature Pulse Respirations Blood Pressure SpO2   11/28/19 1508 11/28/19 1508 11/28/19 1508 11/28/19 1508 11/28/19 1508   (!) 96 9 °F (36 1 °C) 104 18 153/85 98 %      Temp Source Heart Rate Source Patient Position - Orthostatic VS BP Location FiO2 (%)   11/28/19 1508 11/28/19 1508 11/28/19 1508 11/28/19 1508 --   Tympanic Monitor Sitting Left arm       Pain Score       11/28/19 1511       9           Vitals:    11/28/19 1508 11/28/19 1530   BP: 153/85    Pulse: 104 84   Patient Position - Orthostatic VS: Sitting          Visual Acuity      ED Medications  Medications   ibuprofen (MOTRIN) tablet 600 mg (600 mg Oral Given 11/28/19 1525)   methocarbamol (ROBAXIN) tablet 750 mg (750 mg Oral Given 11/28/19 1525)       Diagnostic Studies  Results Reviewed     None                 XR chest 2 views   ED Interpretation by Yasmine Gottlieb DO (11/28 1528)   No ptx or rib fx      Final Result by Tom Grossman MD (11/28 1557)      No acute cardiopulmonary disease  Workstation performed: [id number]                    Procedures  Procedures       ED Course                               MDM  Number of Diagnoses or Management Options  Diagnosis management comments: 60-year-old male presenting with mechanical fall that occurred 3 days ago and rib pain  Will obtain chest x-ray  Pain control  Follow up with PCP  Disposition  Final diagnoses:   Contusion - right ribs     Time reflects when diagnosis was documented in both MDM as applicable and the Disposition within this note     Time User Action Codes Description Comment    11/28/2019  3:28 PM Víctor Fishman [address]  8XXA] Contusion     11/28/2019  3:29 PM Magda Libman  8XXA] Contusion right ribs      ED Disposition     ED Disposition Condition Date/Time Comment    Discharge Stable Thu Nov 28, 2019  3:28 PM Shwetha Lewis discharge to home/self care              Follow-up Information     Follow up With Specialties Details Why [address], [place] Family Medicine In 1 week [place] [address] [place] [address] Emergency Department Emergency Medicine  If symptoms worsen [address] [address] [phone]          Discharge Medication List as of 11/28/2019  3:29 PM      START taking these medications    Details   ibuprofen (MOTRIN) 600 mg tablet Take 1 tablet (600 mg total) by mouth every 6 (six) hours as needed for moderate pain, Starting Thu 11/28/2019, Print         CONTINUE these medications which have NOT CHANGED    Details   !! ammonium lactate (LAC-HYDRIN) 12 % lotion APPLY TO AFFECTED AREA 2 TIMES A DAY AS NEEDED FOR DRY SKIN, Normal      !! ammonium lactate (LAC-HYDRIN) 12 % lotion APPLY TO AFFECTED AREA 2 TIMES A DAY AS NEEDED FOR DRY SKIN, Normal      aspirin (ASPIRIN LOW DOSE) 81 mg EC tablet Take 1 tablet (81 mg total) by mouth daily, Starting Thu 10/24/2019, Normal      atorvastatin (LIPITOR) 40 mg tablet Take 1 tablet (40 mg total) by mouth daily, Starting Fri 3/22/2019, Normal      lisinopril (ZESTRIL) 20 mg tablet Take 1 tablet (20 mg total) by mouth daily, Starting Thu 10/24/2019, Normal      metFORMIN (GLUCOPHAGE) 500 mg tablet Take 1 tablet (500 mg total) by mouth 2 (two) times a day with meals, Starting Thu 6/6/2019, Normal      nicotine (NICODERM CQ) 14 mg/24hr TD 24 hr patch Place 1 patch on the skin every 24 hours, Starting Wed 12/19/2018, Normal      nicotine polacrilex (NICORETTE) 2 mg gum Chew 1 each (2 mg total) as needed for smoking cessation, Starting Wed 12/19/2018, Normal       !! - Potential duplicate medications found  Please discuss with provider  No discharge procedures on file      ED Provider  Electronically Signed by           Matthew Vanegas DO  11/28/19 5619

## 2019-11-28 NOTE — ED NOTES
Patient discharged by Dr Suman Muller, RN unable to reassess pain level prior to discharge       Nadja Buchanan RN  11/28/19 0593

## 2019-12-02 ENCOUNTER — APPOINTMENT (EMERGENCY)
Dept: CT IMAGING | Facility: HOSPITAL | Age: 64
End: 2019-12-02
Payer: COMMERCIAL

## 2019-12-02 ENCOUNTER — HOSPITAL ENCOUNTER (EMERGENCY)
Facility: HOSPITAL | Age: 64
Discharge: HOME | End: 2019-12-02
Attending: EMERGENCY MEDICINE | Admitting: EMERGENCY MEDICINE
Payer: COMMERCIAL

## 2019-12-02 ENCOUNTER — APPOINTMENT (EMERGENCY)
Dept: RADIOLOGY | Facility: HOSPITAL | Age: 64
End: 2019-12-02
Payer: COMMERCIAL

## 2019-12-02 VITALS
DIASTOLIC BLOOD PRESSURE: 78 MMHG | HEART RATE: 93 BPM | OXYGEN SATURATION: 99 % | BODY MASS INDEX: 29.87 KG/M2 | TEMPERATURE: 97.8 F | RESPIRATION RATE: 16 BRPM | WEIGHT: 163.3 LBS | SYSTOLIC BLOOD PRESSURE: 135 MMHG

## 2019-12-02 DIAGNOSIS — R07.89 CHEST WALL PAIN: Primary | ICD-10-CM

## 2019-12-02 DIAGNOSIS — S22.49XA RIB FRACTURES: ICD-10-CM

## 2019-12-02 LAB
ALBUMIN SERPL BCP-MCNC: 4.2 G/DL (ref 3–5.2)
ALP SERPL-CCNC: 84 U/L (ref 43–122)
ALT SERPL W P-5'-P-CCNC: 26 U/L (ref 9–52)
AMPHETAMINES SERPL QL SCN: NEGATIVE
ANION GAP SERPL CALCULATED.3IONS-SCNC: 12 MMOL/L (ref 5–14)
AST SERPL W P-5'-P-CCNC: 22 U/L (ref 17–59)
BACTERIA UR QL AUTO: NORMAL /HPF
BARBITURATES UR QL: NEGATIVE
BASOPHILS # BLD AUTO: 0 THOUSANDS/ΜL (ref 0–0.1)
BASOPHILS NFR BLD AUTO: 0 % (ref 0–1)
BENZODIAZ UR QL: NEGATIVE
BILIRUB SERPL-MCNC: 0.4 MG/DL
BILIRUB UR QL STRIP: NEGATIVE
BUN SERPL-MCNC: 13 MG/DL (ref 5–25)
CALCIUM SERPL-MCNC: 9.3 MG/DL (ref 8.4–10.2)
CHLORIDE SERPL-SCNC: 102 MMOL/L (ref 97–108)
CLARITY UR: CLEAR
CO2 SERPL-SCNC: 24 MMOL/L (ref 22–30)
COCAINE UR QL: NEGATIVE
COLOR UR: YELLOW
CREAT SERPL-MCNC: 0.71 MG/DL (ref 0.7–1.5)
EOSINOPHIL # BLD AUTO: 0.2 THOUSAND/ΜL (ref 0–0.4)
EOSINOPHIL NFR BLD AUTO: 2 % (ref 0–6)
ERYTHROCYTE [DISTWIDTH] IN BLOOD BY AUTOMATED COUNT: 13.5 %
GFR SERPL CREATININE-BSD FRML MDRD: 115 ML/MIN/1.73SQ M
GLUCOSE SERPL-MCNC: 204 MG/DL (ref 70–99)
GLUCOSE UR STRIP-MCNC: ABNORMAL MG/DL
HCT VFR BLD AUTO: 38.9 % (ref 41–53)
HGB BLD-MCNC: 13.5 G/DL (ref 13.5–17.5)
HGB UR QL STRIP.AUTO: NEGATIVE
KETONES UR STRIP-MCNC: NEGATIVE MG/DL
LEUKOCYTE ESTERASE UR QL STRIP: NEGATIVE
LYMPHOCYTES # BLD AUTO: 2.1 THOUSANDS/ΜL (ref 0.5–4)
LYMPHOCYTES NFR BLD AUTO: 25 % (ref 25–45)
MCH RBC QN AUTO: 31 PG (ref 26–34)
MCHC RBC AUTO-ENTMCNC: 34.6 G/DL (ref 31–36)
MCV RBC AUTO: 89 FL (ref 80–100)
METHADONE UR QL: NEGATIVE
MONOCYTES # BLD AUTO: 0.7 THOUSAND/ΜL (ref 0.2–0.9)
MONOCYTES NFR BLD AUTO: 9 % (ref 1–10)
NEUTROPHILS # BLD AUTO: 5.5 THOUSANDS/ΜL (ref 1.8–7.8)
NEUTS SEG NFR BLD AUTO: 64 % (ref 45–65)
NITRITE UR QL STRIP: NEGATIVE
NON-SQ EPI CELLS URNS QL MICRO: NORMAL /HPF
NT-PROBNP SERPL-MCNC: 79 PG/ML (ref 0–300)
OPIATES UR QL SCN: NEGATIVE
PCP UR QL: NEGATIVE
PH UR STRIP.AUTO: 6 [PH]
PLATELET # BLD AUTO: 287 THOUSANDS/UL (ref 150–450)
PMV BLD AUTO: 7.9 FL (ref 8.9–12.7)
POTASSIUM SERPL-SCNC: 3.8 MMOL/L (ref 3.6–5)
PROT SERPL-MCNC: 7.5 G/DL (ref 5.9–8.4)
PROT UR STRIP-MCNC: NEGATIVE MG/DL
RBC # BLD AUTO: 4.35 MILLION/UL (ref 4.5–5.9)
RBC #/AREA URNS AUTO: NORMAL /HPF
SODIUM SERPL-SCNC: 138 MMOL/L (ref 137–147)
SP GR UR STRIP.AUTO: 1.02 (ref 1–1.04)
THC UR QL: NEGATIVE
TROPONIN I SERPL-MCNC: <0.01 NG/ML (ref 0–0.03)
UROBILINOGEN UA: NEGATIVE MG/DL
WBC # BLD AUTO: 8.6 THOUSAND/UL (ref 4.5–11)
WBC #/AREA URNS AUTO: NORMAL /HPF

## 2019-12-02 PROCEDURE — 83880 ASSAY OF NATRIURETIC PEPTIDE: CPT | Performed by: EMERGENCY MEDICINE

## 2019-12-02 PROCEDURE — 85025 COMPLETE CBC W/AUTO DIFF WBC: CPT | Performed by: EMERGENCY MEDICINE

## 2019-12-02 PROCEDURE — 99285 EMERGENCY DEPT VISIT HI MDM: CPT

## 2019-12-02 PROCEDURE — 93005 ELECTROCARDIOGRAM TRACING: CPT

## 2019-12-02 PROCEDURE — 81001 URINALYSIS AUTO W/SCOPE: CPT | Performed by: EMERGENCY MEDICINE

## 2019-12-02 PROCEDURE — 71046 X-RAY EXAM CHEST 2 VIEWS: CPT

## 2019-12-02 PROCEDURE — 71250 CT THORAX DX C-: CPT

## 2019-12-02 PROCEDURE — 80053 COMPREHEN METABOLIC PANEL: CPT | Performed by: EMERGENCY MEDICINE

## 2019-12-02 PROCEDURE — 99284 EMERGENCY DEPT VISIT MOD MDM: CPT | Performed by: EMERGENCY MEDICINE

## 2019-12-02 PROCEDURE — 84484 ASSAY OF TROPONIN QUANT: CPT | Performed by: EMERGENCY MEDICINE

## 2019-12-02 PROCEDURE — 36415 COLL VENOUS BLD VENIPUNCTURE: CPT | Performed by: EMERGENCY MEDICINE

## 2019-12-02 PROCEDURE — 80307 DRUG TEST PRSMV CHEM ANLYZR: CPT | Performed by: EMERGENCY MEDICINE

## 2019-12-02 RX ORDER — LIDOCAINE 50 MG/G
1 PATCH TOPICAL ONCE
Status: DISCONTINUED | OUTPATIENT
Start: 2019-12-02 | End: 2019-12-02 | Stop reason: HOSPADM

## 2019-12-02 RX ORDER — LIDOCAINE 50 MG/G
1 PATCH TOPICAL DAILY
Qty: 15 PATCH | Refills: 0 | Status: SHIPPED | OUTPATIENT
Start: 2019-12-02 | End: 2020-08-20

## 2019-12-02 RX ORDER — OXYCODONE HYDROCHLORIDE AND ACETAMINOPHEN 5; 325 MG/1; MG/1
1 TABLET ORAL ONCE
Status: COMPLETED | OUTPATIENT
Start: 2019-12-02 | End: 2019-12-02

## 2019-12-02 RX ORDER — OXYCODONE HYDROCHLORIDE AND ACETAMINOPHEN 5; 325 MG/1; MG/1
1 TABLET ORAL EVERY 4 HOURS PRN
Qty: 10 TABLET | Refills: 0 | Status: SHIPPED | OUTPATIENT
Start: 2019-12-02 | End: 2020-08-20

## 2019-12-02 RX ADMIN — OXYCODONE HYDROCHLORIDE AND ACETAMINOPHEN 1 TABLET: 5; 325 TABLET ORAL at 21:17

## 2019-12-02 RX ADMIN — LIDOCAINE 1 PATCH: 50 PATCH TOPICAL at 21:18

## 2019-12-03 LAB
ATRIAL RATE: 109 BPM
P AXIS: 41 DEGREES
PR INTERVAL: 130 MS
QRS AXIS: -8 DEGREES
QRSD INTERVAL: 86 MS
QT INTERVAL: 330 MS
QTC INTERVAL: 444 MS
T WAVE AXIS: 188 DEGREES
VENTRICULAR RATE: 109 BPM

## 2019-12-03 PROCEDURE — 93010 ELECTROCARDIOGRAM REPORT: CPT | Performed by: INTERNAL MEDICINE

## 2019-12-03 NOTE — ED PROVIDER NOTES
History  Chief Complaint   Patient presents with    Chest Pain     pt  w/ right sided chest/rib pains x 1 week    Isael Rosen fell last monday & was seen here & given meds that he reports are not working for pain    VIA      61-year-old gentleman presents complaint of chest pain  He states that he is having right-sided chest discomfort that began one week ago when he fell  He was evaluated and was given a prescription for ibuprofen  Patient reports that he has had continued pain symptoms but did not seek re-evaluation sooner because he was working  He did not contact his primary care clinician either  He denies any other associated injuries or concerns  Chest Pain   Pain location:  R lateral chest  Pain quality: aching and dull    Pain radiates to:  Does not radiate  Pain severity:  Moderate  Onset quality:  Sudden  Duration:  1 week  Timing:  Constant  Progression:  Waxing and waning  Relieved by:  Nothing  Worsened by:  Coughing and deep breathing  Ineffective treatments: Ibuprofen  Associated symptoms: no abdominal pain, no altered mental status, no anorexia, no anxiety, no back pain, no claudication, no cough, no diaphoresis, no dizziness, no dysphagia, no fatigue, no fever, no headache, no heartburn, no lower extremity edema, no nausea, no near-syncope, no numbness, no orthopnea, no palpitations, no PND, no shortness of breath, no syncope, not vomiting and no weakness        Prior to Admission Medications   Prescriptions Last Dose Informant Patient Reported?  Taking?   ammonium lactate (LAC-HYDRIN) 12 % lotion   No No   Sig: APPLY TO AFFECTED AREA 2 TIMES A DAY AS NEEDED FOR DRY SKIN   ammonium lactate (LAC-HYDRIN) 12 % lotion   No No   Sig: APPLY TO AFFECTED AREA 2 TIMES A DAY AS NEEDED FOR DRY SKIN   aspirin (ASPIRIN LOW DOSE) 81 mg EC tablet   No No   Sig: Take 1 tablet (81 mg total) by mouth daily   atorvastatin (LIPITOR) 40 mg tablet   No No   Sig: Take 1 tablet (40 mg total) by mouth daily ibuprofen (MOTRIN) 600 mg tablet   No No   Sig: Take 1 tablet (600 mg total) by mouth every 6 (six) hours as needed for moderate pain   lisinopril (ZESTRIL) 20 mg tablet   No No   Sig: Take 1 tablet (20 mg total) by mouth daily   metFORMIN (GLUCOPHAGE) 500 mg tablet   No No   Sig: Take 1 tablet (500 mg total) by mouth 2 (two) times a day with meals   nicotine (NICODERM CQ) 14 mg/24hr TD 24 hr patch   No No   Sig: Place 1 patch on the skin every 24 hours   nicotine polacrilex (NICORETTE) 2 mg gum   No No   Sig: Chew 1 each (2 mg total) as needed for smoking cessation      Facility-Administered Medications: None       Past Medical History:   Diagnosis Date    HLD (hyperlipidemia)     Hypertension        History reviewed  No pertinent surgical history  History reviewed  No pertinent family history  I have reviewed and agree with the history as documented  Social History     Tobacco Use    Smoking status: Current Every Day Smoker     Packs/day: 0 50     Types: Cigarettes    Smokeless tobacco: Current User   Substance Use Topics    Alcohol use: Not Currently    Drug use: Not Currently     Comment: past use of Cocaine        Review of Systems   Constitutional: Negative for activity change, chills, diaphoresis, fatigue and fever  HENT: Negative  Negative for congestion, postnasal drip, rhinorrhea, sinus pain, sore throat and trouble swallowing  Eyes: Negative  Respiratory: Negative  Negative for cough and shortness of breath  Cardiovascular: Positive for chest pain  Negative for palpitations, orthopnea, claudication, syncope, PND and near-syncope  Gastrointestinal: Negative for abdominal pain, anorexia, constipation, diarrhea, heartburn, nausea and vomiting  Endocrine: Negative  Genitourinary: Negative  Musculoskeletal: Negative  Negative for arthralgias, back pain and myalgias  Skin: Negative  Allergic/Immunologic: Negative  Neurological: Negative    Negative for dizziness, weakness, numbness and headaches  Hematological: Negative  Psychiatric/Behavioral: Negative  Physical Exam  Physical Exam   Constitutional: He is oriented to person, place, and time  He appears well-developed and well-nourished  No distress  HENT:   Head: Normocephalic and atraumatic  Eyes: Pupils are equal, round, and reactive to light  Neck: Neck supple  Cardiovascular: Normal rate, regular rhythm, normal heart sounds and intact distal pulses  Right-sided chest discomfort with palpation   Pulmonary/Chest: Effort normal and breath sounds normal  No respiratory distress  Abdominal: Soft  Bowel sounds are normal  He exhibits no distension  There is no tenderness  There is no guarding  Musculoskeletal: Normal range of motion  He exhibits no edema  Right lower leg: He exhibits no edema  Left lower leg: He exhibits no edema  Neurological: He is alert and oriented to person, place, and time  Skin: Skin is warm and dry  Capillary refill takes less than 2 seconds  He is not diaphoretic  Psychiatric: He has a normal mood and affect  His behavior is normal    Nursing note and vitals reviewed        Vital Signs  ED Triage Vitals [12/02/19 1949]   Temperature Pulse Respirations Blood Pressure SpO2   97 8 °F (36 6 °C) (!) 108 20 162/88 98 %      Temp Source Heart Rate Source Patient Position - Orthostatic VS BP Location FiO2 (%)   Tympanic Monitor Lying Left arm --      Pain Score       9           Vitals:    12/02/19 1949 12/02/19 2118   BP: 162/88 135/78   Pulse: (!) 108 93   Patient Position - Orthostatic VS: Lying Lying         Visual Acuity      ED Medications  Medications   lidocaine (LIDODERM) 5 % patch 1 patch (1 patch Topical Medication Applied 12/2/19 2118)   oxyCODONE-acetaminophen (PERCOCET) 5-325 mg per tablet 1 tablet (1 tablet Oral Given 12/2/19 2117)       Diagnostic Studies  Results Reviewed     Procedure Component Value Units Date/Time    Comprehensive metabolic panel [[id number]]  (Abnormal) Collected:  12/02/19 1954    Lab Status:  Final result Specimen:  Blood from Arm, Right Updated:  12/02/19 2053     Sodium 138 mmol/L      Potassium 3 8 mmol/L      Chloride 102 mmol/L      CO2 24 mmol/L      ANION GAP 12 mmol/L      BUN 13 mg/dL      Creatinine 0 71 mg/dL      Glucose 204 mg/dL      Calcium 9 3 mg/dL      AST 22 U/L      ALT 26 U/L      Alkaline Phosphatase 84 U/L      Total Protein 7 5 g/dL      Albumin 4 2 g/dL      Total Bilirubin 0 40 mg/dL      eGFR 115 ml/min/1 73sq m     Narrative:       [place] guidelines for Chronic Kidney Disease (CKD):     Stage 1 with normal or high GFR (GFR > 90 mL/min/1 73 square meters)    Stage 2 Mild CKD (GFR = 60-89 mL/min/1 73 square meters)    Stage 3A Moderate CKD (GFR = 45-59 mL/min/1 73 square meters)    Stage 3B Moderate CKD (GFR = 30-44 mL/min/1 73 square meters)    Stage 4 Severe CKD (GFR = 15-29 mL/min/1 73 square meters)    Stage 5 End Stage CKD (GFR <15 mL/min/1 73 square meters)  Note: GFR calculation is accurate only with a steady state creatinine    NT-BNP PRO [[id number]]  (Normal) Collected:  12/02/19 1954    Lab Status:  Final result Specimen:  Blood from Arm, Right Updated:  12/02/19 2041     NT-proBNP 79 0 pg/mL     Rapid drug screen, urine [[id number]]  (Normal) Collected:  12/02/19 2002    Lab Status:  Final result Specimen:  Urine, Other Updated:  12/02/19 2036     Amph/Meth UR Negative     Barbiturate Ur Negative     Benzodiazepine Urine Negative     Cocaine Urine Negative     Methadone Urine Negative     Opiate Urine Negative     PCP Ur Negative     THC Urine Negative    Narrative:       FOR MEDICAL PURPOSES ONLY  IF CONFIRMATION NEEDED PLEASE CONTACT THE LAB WITHIN 5 DAYS      Drug Screen Cutoff Levels:  AMPHETAMINE/METHAMPHETAMINES  1000 ng/mL  BARBITURATES     200 ng/mL  BENZODIAZEPINES     200 ng/mL  COCAINE      300 ng/mL  METHADONE      300 ng/mL  OPIATES      300 ng/mL  PHENCYCLIDINE     25 ng/mL  THC       50 ng/mL      Urine Microscopic [[id number]]  (Normal) Collected:  12/02/19 2002    Lab Status:  Final result Specimen:  Urine, Other Updated:  12/02/19 2034     RBC, UA None Seen /hpf      WBC, UA None Seen /hpf      Epithelial Cells None Seen /hpf      Bacteria, UA None Seen /hpf     Troponin I [[id number]]  (Normal) Collected:  12/02/19 1954    Lab Status:  Final result Specimen:  Blood from Arm, Right Updated:  12/02/19 2030     Troponin I <0 01 ng/mL     UA (URINE) with reflex to Scope [[id number]]  (Abnormal) Collected:  12/02/19 2002    Lab Status:  Final result Specimen:  Urine, Other Updated:  12/02/19 2019     Color, UA Yellow     Clarity, UA Clear     Specific [place], UA 1 020     pH, UA 6 0     Leukocytes, UA Negative     Nitrite, UA Negative     Protein, UA Negative mg/dl      Glucose, UA >=1000 (1%) mg/dl      Ketones, UA Negative mg/dl      Bilirubin, UA Negative     Blood, UA Negative     UROBILINOGEN UA Negative mg/dL     CBC and differential [[id number]]  (Abnormal) Collected:  12/02/19 1954    Lab Status:  Final result Specimen:  Blood from Arm, Right Updated:  12/02/19 2008     WBC 8 60 Thousand/uL      RBC 4 35 Million/uL      Hemoglobin 13 5 g/dL      Hematocrit 38 9 %      MCV 89 fL      MCH 31 0 pg      MCHC 34 6 g/dL      RDW 13 5 %      MPV 7 9 fL      Platelets 042 Thousands/uL      Neutrophils Relative 64 %      Lymphocytes Relative 25 %      Monocytes Relative 9 %      Eosinophils Relative 2 %      Basophils Relative 0 %      Neutrophils Absolute 5 50 Thousands/µL      Lymphocytes Absolute 2 10 Thousands/µL      Monocytes Absolute 0 70 Thousand/µL      Eosinophils Absolute 0 20 Thousand/µL      Basophils Absolute 0 00 Thousands/µL                  CT chest without contrast   Final Result by Rosita Doe MD (12/02 2055)      Mildly displaced fractures of the right lateral fifth through ninth ribs        The study was marked in [facility] for immediate notification  Workstation performed: [id number]         XR chest 2 views    (Results Pending)              Procedures  ECG 12 Lead Documentation Only  Date/Time: 12/2/2019 7:52 PM  Performed by: Domenica Barajas DO  Authorized by: Domenica Barajas DO     ECG reviewed by me, the ED Provider: yes    Patient location:  ED  Rate:     ECG rate:  109    ECG rate assessment: tachycardic    Rhythm:     Rhythm: sinus tachycardia    Ectopy:     Ectopy: none    QRS:     QRS axis:  Left  Conduction:     Conduction: normal    ST segments:     ST segments:  Normal  T waves:     T waves: non-specific               ED Course         HEART Risk Score      Most Recent Value   History  0 Filed at: 12/02/2019 2105   ECG  0 Filed at: 12/02/2019 2105   Age  1 Filed at: 12/02/2019 2105   Risk Factors  2 Filed at: 12/02/2019 2105   Troponin  0 Filed at: 12/02/2019 2105   Heart Score Risk Calculator   History  0 Filed at: 12/02/2019 2105   ECG  0 Filed at: 12/02/2019 2105   Age  1 Filed at: 12/02/2019 2105   Risk Factors  2 Filed at: 12/02/2019 2105   Troponin  0 Filed at: 12/02/2019 2105   HEART Score  3 Filed at: 12/02/2019 2105   HEART Score  3 Filed at: 12/02/2019 2105                            MDM  Number of Diagnoses or Management Options  Chest wall pain:   Rib fractures:   Diagnosis management comments: 13-year-old gentleman presents with complaint of chest pain  Initial history was very vague and so a full cardiac workup was ordered  Later he provided additional history indicating this chest pain has been ongoing for the past week since he fell  Chest x-ray shows what appears to be a rib fracture that was not read last week  A CT scan of his chest was ordered and showed five minimally displaced rib fractures with no underlying pneumonia or pneumothorax  Patient has been advised of these fractures and the need for follow-up  Will provide additional analgesia    Patient is aware of the importance of follow-up along with reasons return to the ER  Amount and/or Complexity of Data Reviewed  Clinical lab tests: ordered and reviewed  Tests in the radiology section of CPT®: ordered and reviewed  Tests in the medicine section of CPT®: ordered and reviewed  Independent visualization of images, tracings, or specimens: yes          Disposition  Final diagnoses:   Chest wall pain   Rib fractures     Time reflects when diagnosis was documented in both MDM as applicable and the Disposition within this note     Time User Action Codes Description Comment    12/2/2019  9:07 PM Anika Stroud Add [R07 89] Chest wall pain     12/2/2019  9:07 PM Anika Lopez [S22 39XA] Rib fractures       ED Disposition     ED Disposition Condition Date/Time Comment    Discharge Stable Mon Dec 2, 2019  9:05 PM Pao Couch discharge to home/self care  Follow-up Information     Follow up With Specialties Details Why [address] MD [facility] Call   [address]  [address] [place] [address] Emergency Department Emergency Medicine  If symptoms worsen [address] [address] [phone]          Patient's Medications   Discharge Prescriptions    No medications on file     No discharge procedures on file      ED Provider  Electronically Signed by           Belinda Castellanos DO  12/02/19 2140

## 2020-03-13 DIAGNOSIS — I10 ESSENTIAL HYPERTENSION: ICD-10-CM

## 2020-03-13 RX ORDER — ASPIRIN 81 MG
TABLET, DELAYED RELEASE (ENTERIC COATED) ORAL
Qty: 30 TABLET | Refills: 2 | Status: SHIPPED | OUTPATIENT
Start: 2020-03-13 | End: 2020-06-19

## 2020-03-13 RX ORDER — LISINOPRIL 20 MG/1
20 TABLET ORAL DAILY
Qty: 30 TABLET | Refills: 2 | Status: SHIPPED | OUTPATIENT
Start: 2020-03-13 | End: 2020-06-19

## 2020-06-18 DIAGNOSIS — I10 ESSENTIAL HYPERTENSION: ICD-10-CM

## 2020-06-19 RX ORDER — LISINOPRIL 20 MG/1
20 TABLET ORAL DAILY
Qty: 30 TABLET | Refills: 0 | Status: SHIPPED | OUTPATIENT
Start: 2020-06-19 | End: 2020-07-28

## 2020-06-19 RX ORDER — ASPIRIN 81 MG/1
TABLET, COATED ORAL
Qty: 30 TABLET | Refills: 0 | Status: SHIPPED | OUTPATIENT
Start: 2020-06-19 | End: 2020-07-28

## 2020-06-23 DIAGNOSIS — E11.9 TYPE 2 DIABETES MELLITUS WITHOUT COMPLICATION, WITHOUT LONG-TERM CURRENT USE OF INSULIN (HCC): ICD-10-CM

## 2020-07-28 DIAGNOSIS — I10 ESSENTIAL HYPERTENSION: ICD-10-CM

## 2020-07-28 RX ORDER — ASPIRIN 81 MG/1
TABLET, COATED ORAL
Qty: 30 TABLET | Refills: 0 | Status: SHIPPED | OUTPATIENT
Start: 2020-07-28 | End: 2020-08-20 | Stop reason: SDUPTHER

## 2020-07-28 RX ORDER — LISINOPRIL 20 MG/1
20 TABLET ORAL DAILY
Qty: 30 TABLET | Refills: 0 | Status: SHIPPED | OUTPATIENT
Start: 2020-07-28 | End: 2020-08-20 | Stop reason: SDUPTHER

## 2020-08-20 ENCOUNTER — OFFICE VISIT (OUTPATIENT)
Dept: FAMILY MEDICINE CLINIC | Facility: CLINIC | Age: 65
End: 2020-08-20

## 2020-08-20 VITALS
TEMPERATURE: 98 F | OXYGEN SATURATION: 98 % | DIASTOLIC BLOOD PRESSURE: 90 MMHG | SYSTOLIC BLOOD PRESSURE: 148 MMHG | WEIGHT: 164.56 LBS | HEART RATE: 97 BPM | BODY MASS INDEX: 30.1 KG/M2 | RESPIRATION RATE: 18 BRPM

## 2020-08-20 DIAGNOSIS — I50.20 SYSTOLIC HEART FAILURE, UNSPECIFIED HF CHRONICITY (HCC): ICD-10-CM

## 2020-08-20 DIAGNOSIS — I10 ESSENTIAL HYPERTENSION: ICD-10-CM

## 2020-08-20 DIAGNOSIS — E11.9 TYPE 2 DIABETES MELLITUS WITHOUT COMPLICATION, WITHOUT LONG-TERM CURRENT USE OF INSULIN (HCC): Primary | ICD-10-CM

## 2020-08-20 DIAGNOSIS — Z72.0 TOBACCO USER: ICD-10-CM

## 2020-08-20 DIAGNOSIS — L84 CALLUS OF FOOT: ICD-10-CM

## 2020-08-20 DIAGNOSIS — I10 HTN, GOAL BELOW 140/90: ICD-10-CM

## 2020-08-20 DIAGNOSIS — E11.9 DIABETIC EYE EXAM (HCC): ICD-10-CM

## 2020-08-20 DIAGNOSIS — Z01.00 DIABETIC EYE EXAM (HCC): ICD-10-CM

## 2020-08-20 DIAGNOSIS — Z12.11 COLON CANCER SCREENING: ICD-10-CM

## 2020-08-20 DIAGNOSIS — E78.2 MIXED HYPERLIPIDEMIA: ICD-10-CM

## 2020-08-20 LAB — SL AMB POCT HEMOGLOBIN AIC: 7.2 (ref ?–6.5)

## 2020-08-20 PROCEDURE — 3051F HG A1C>EQUAL 7.0%<8.0%: CPT | Performed by: FAMILY MEDICINE

## 2020-08-20 PROCEDURE — 3077F SYST BP >= 140 MM HG: CPT | Performed by: FAMILY MEDICINE

## 2020-08-20 PROCEDURE — 92250 FUNDUS PHOTOGRAPHY W/I&R: CPT | Performed by: FAMILY MEDICINE

## 2020-08-20 PROCEDURE — 4010F ACE/ARB THERAPY RXD/TAKEN: CPT | Performed by: FAMILY MEDICINE

## 2020-08-20 PROCEDURE — 83036 HEMOGLOBIN GLYCOSYLATED A1C: CPT | Performed by: FAMILY MEDICINE

## 2020-08-20 PROCEDURE — 99214 OFFICE O/P EST MOD 30 MIN: CPT | Performed by: FAMILY MEDICINE

## 2020-08-20 PROCEDURE — 3080F DIAST BP >= 90 MM HG: CPT | Performed by: FAMILY MEDICINE

## 2020-08-20 RX ORDER — ASPIRIN 81 MG/1
81 TABLET ORAL DAILY
Qty: 90 TABLET | Refills: 2 | Status: SHIPPED | OUTPATIENT
Start: 2020-08-20 | End: 2021-02-04 | Stop reason: SDUPTHER

## 2020-08-20 RX ORDER — ATORVASTATIN CALCIUM 40 MG/1
40 TABLET, FILM COATED ORAL DAILY
Qty: 30 TABLET | Refills: 3 | Status: SHIPPED | OUTPATIENT
Start: 2020-08-20 | End: 2021-02-04 | Stop reason: SDUPTHER

## 2020-08-20 RX ORDER — LISINOPRIL 20 MG/1
20 TABLET ORAL DAILY
Qty: 90 TABLET | Refills: 1 | Status: SHIPPED | OUTPATIENT
Start: 2020-08-20 | End: 2021-02-04 | Stop reason: SDUPTHER

## 2020-08-20 NOTE — ASSESSMENT & PLAN NOTE
Lab Results   Component Value Date    HGBA1C 7 2 (A) 08/20/2020       - continue metformin 500 mg b i d   - repeat A1c in 3 months  - no signs of hypoglycemia

## 2020-08-20 NOTE — ASSESSMENT & PLAN NOTE
Wt Readings from Last 3 Encounters:   08/20/20 74 6 kg (164 lb 9 oz)   12/02/19 74 1 kg (163 lb 4 8 oz)   11/28/19 73 8 kg (162 lb 11 2 oz)       - stable  - continue lisinopril

## 2020-08-20 NOTE — PROGRESS NOTES
Assessment/Plan:    Type 2 diabetes mellitus without complication, without long-term current use of insulin (HCC)    Lab Results   Component Value Date    HGBA1C 7 2 (A) 08/20/2020       - continue metformin 500 mg b i d   - repeat A1c in 3 months  - no signs of hypoglycemia  HTN, goal below 140/90  - controlled  - continue lisinopril 20 mg daily    Systolic heart failure (HCC)  Wt Readings from Last 3 Encounters:   08/20/20 74 6 kg (164 lb 9 oz)   12/02/19 74 1 kg (163 lb 4 8 oz)   11/28/19 73 8 kg (162 lb 11 2 oz)       - stable  - continue lisinopril    Tobacco user  Counseled patient on the importance of smoking cessation  Counseling time was over 3 minutes  Patient acknowledges an understanding of the risks of smoking  At the very least, patient is agreeable to try cutting down  Discussed outside services to help with quitting smoking  Discussed the specifics of using nicotine patches, on a tapering schedule,       after stopping smoking  Tapering can last 6 months  While on the nicotine patches, I instructed on the use of PRN nicotine gum,       instead of reaching for a cigarette, in a moment of weakness  Mixed hyperlipidemia  -continue atorvastatin 40 mg daily  - repeat lipid panel follow-up visit  Callus of foot  - will refer to podiatry       Diagnoses and all orders for this visit:    Type 2 diabetes mellitus without complication, without long-term current use of insulin (HCC)  -     POCT hemoglobin A1c  -     metFORMIN (GLUCOPHAGE) 500 mg tablet; Take 1 tablet (500 mg total) by mouth 2 (two) times a day with meals    Mixed hyperlipidemia  -     atorvastatin (LIPITOR) 40 mg tablet; Take 1 tablet (40 mg total) by mouth daily    Systolic heart failure, unspecified HF chronicity (HCC)    Tobacco user    Essential hypertension  -     lisinopril (ZESTRIL) 20 mg tablet; Take 1 tablet (20 mg total) by mouth daily  -     aspirin (Aspirin Low Dose) 81 mg EC tablet;  Take 1 tablet (81 mg total) by mouth daily    Colon cancer screening  -     Ambulatory referral to General Surgery; Future    Diabetic eye exam ([facility] )  -     Cancel: [facility] Diabetic eye exam  -     Cancel: [facility] Diabetic eye exam    HTN, goal below 140/90    Callus of foot  -     Ambulatory referral to Podiatry; Future          Subjective:      Patient ID: Corinne Eller is a 62 y o  male  This is a pleasant 61-year-old male with known past medical history of systolic CHF, hypertension, hyperlipidemia and tobacco abuse who presents to the clinic for follow-up visit  Patient reports he is doing well and is compliant with all of his medications he states that he has not been to the clinic in several months due to [employer]  Otherwise, he reports that he continues to smoke couple of cigarettes a day and continues to drink every day  No other complaints at the moment  The following portions of the patient's history were reviewed and updated as appropriate:   He  has a past medical history of HLD (hyperlipidemia) and Hypertension  He   Patient Active Problem List    Diagnosis Date Noted    Colon cancer screening 08/20/2020    Callus of foot 08/20/2020    Type 2 diabetes mellitus without complication, without long-term current use of insulin ([email or web address] ) 03/20/2019    HTN, goal below 140/90 02/27/2018    Mixed hyperlipidemia 85/98/6886    Systolic heart failure ([facility] ) 02/27/2018    Tobacco user 02/27/2018     He  has no past surgical history on file  His family history is not on file  He  reports that he has been smoking cigarettes  He has been smoking about 0 50 packs per day  He uses smokeless tobacco  He reports current alcohol use  He reports previous drug use    Current Outpatient Medications   Medication Sig Dispense Refill    aspirin (Aspirin Low Dose) 81 mg EC tablet Take 1 tablet (81 mg total) by mouth daily 90 tablet 2    lisinopril (ZESTRIL) 20 mg tablet Take 1 tablet (20 mg total) by mouth daily 90 tablet 1    metFORMIN (GLUCOPHAGE) 500 mg tablet Take 1 tablet (500 mg total) by mouth 2 (two) times a day with meals 60 tablet 2    atorvastatin (LIPITOR) 40 mg tablet Take 1 tablet (40 mg total) by mouth daily 30 tablet 3    nicotine (NICODERM CQ) 14 mg/24hr TD 24 hr patch Place 1 patch on the skin every 24 hours (Patient not taking: Reported on 8/20/2020) 28 patch 3     No current facility-administered medications for this visit       Review of Systems   Constitutional: Negative for fatigue and fever  HENT: Negative for congestion and sore throat  Eyes: Negative for visual disturbance  Respiratory: Negative for cough and wheezing  Cardiovascular: Negative for leg swelling  Gastrointestinal: Negative for abdominal pain, anal bleeding, constipation, diarrhea, nausea and vomiting  Endocrine: Negative for cold intolerance and heat intolerance  Musculoskeletal: Negative for arthralgias and joint swelling  Skin: Negative for color change  Neurological: Negative for dizziness, seizures, syncope, weakness and light-headedness  Psychiatric/Behavioral: Negative for agitation, confusion, sleep disturbance and suicidal ideas  Objective:      /90 (BP Location: Right arm, Patient Position: Sitting, Cuff Size: Standard)   Pulse 97   Temp 98 °F (36 7 °C) (Temporal)   Resp 18   Wt 74 6 kg (164 lb 9 oz)   SpO2 98%   BMI 30 10 kg/m²          Physical Exam  Constitutional:       Appearance: He is well-developed  HENT:      Head: Normocephalic and atraumatic  Eyes:      Conjunctiva/sclera: Conjunctivae normal       Pupils: Pupils are equal, round, and reactive to light  Neck:      Musculoskeletal: Normal range of motion and neck supple  Vascular: No JVD  Cardiovascular:      Rate and Rhythm: Normal rate and regular rhythm  Pulses: Pulses are weak  Dorsalis pedis pulses are 2+ on the right side and 2+ on the left side          Posterior tibial pulses are 0 on the right side and 0 on the left side  Heart sounds: Normal heart sounds  No murmur  No friction rub  No gallop  Pulmonary:      Effort: Pulmonary effort is normal  No respiratory distress  Breath sounds: Normal breath sounds  No wheezing  Abdominal:      General: Bowel sounds are normal  There is no distension  Palpations: Abdomen is soft  Tenderness: There is no abdominal tenderness  Musculoskeletal: Normal range of motion  Feet:      Right foot:      Skin integrity: Callus and dry skin present  Left foot:      Skin integrity: Callus and dry skin present  Skin:     General: Skin is warm and dry  Neurological:      Mental Status: He is alert and oriented to person, place, and time  Deep Tendon Reflexes: Reflexes are normal and symmetric  Psychiatric:         Behavior: Behavior normal          Thought Content: Thought content normal          Judgment: Judgment normal          Patient's shoes and socks removed  Right Foot/Ankle   Right Foot Inspection  Skin Exam: dry skin, callus and callus                          Toe Exam: ROM and strength within normal limits  Sensory   Vibration: intact  Proprioception: intact   Monofilament testing: intact  Vascular    The right DP pulse is 2+  The right PT pulse is 0  Left Foot/Ankle  Left Foot Inspection  Skin Exam: dry skin and callus                         Toe Exam: ROM and strength within normal limits                   Sensory   Vibration: intact  Proprioception: intact  Monofilament: intact  Vascular    The left DP pulse is 2+  The left PT pulse is 0  Assign Risk Category:  No deformity present;  No loss of protective sensation; Weak pulses       Risk: 0

## 2020-08-25 ENCOUNTER — TELEPHONE (OUTPATIENT)
Dept: SURGERY | Facility: CLINIC | Age: 65
End: 2020-08-25

## 2020-08-25 NOTE — TELEPHONE ENCOUNTER
Received referral in [address] [place] [address]  Printed script  Mailed to patients home address to contact us for scheduling

## 2021-02-04 ENCOUNTER — OFFICE VISIT (OUTPATIENT)
Dept: FAMILY MEDICINE CLINIC | Facility: CLINIC | Age: 66
End: 2021-02-04

## 2021-02-04 VITALS
DIASTOLIC BLOOD PRESSURE: 82 MMHG | TEMPERATURE: 97.6 F | WEIGHT: 155 LBS | BODY MASS INDEX: 25.83 KG/M2 | SYSTOLIC BLOOD PRESSURE: 124 MMHG | HEIGHT: 65 IN | RESPIRATION RATE: 18 BRPM | OXYGEN SATURATION: 98 % | HEART RATE: 101 BPM

## 2021-02-04 DIAGNOSIS — I10 ESSENTIAL HYPERTENSION: ICD-10-CM

## 2021-02-04 DIAGNOSIS — I10 HTN, GOAL BELOW 140/90: ICD-10-CM

## 2021-02-04 DIAGNOSIS — Z72.0 TOBACCO USER: ICD-10-CM

## 2021-02-04 DIAGNOSIS — E11.9 TYPE 2 DIABETES MELLITUS WITHOUT COMPLICATION, WITHOUT LONG-TERM CURRENT USE OF INSULIN (HCC): Primary | ICD-10-CM

## 2021-02-04 DIAGNOSIS — Z23 NEED FOR PNEUMOCOCCAL VACCINATION: ICD-10-CM

## 2021-02-04 DIAGNOSIS — E78.2 MIXED HYPERLIPIDEMIA: ICD-10-CM

## 2021-02-04 LAB — SL AMB POCT HEMOGLOBIN AIC: 7.2 (ref ?–6.5)

## 2021-02-04 PROCEDURE — 83036 HEMOGLOBIN GLYCOSYLATED A1C: CPT | Performed by: FAMILY MEDICINE

## 2021-02-04 PROCEDURE — 99214 OFFICE O/P EST MOD 30 MIN: CPT | Performed by: FAMILY MEDICINE

## 2021-02-04 RX ORDER — ASPIRIN 81 MG/1
81 TABLET ORAL DAILY
Qty: 90 TABLET | Refills: 2 | Status: SHIPPED | OUTPATIENT
Start: 2021-02-04 | End: 2021-05-20 | Stop reason: SDUPTHER

## 2021-02-04 RX ORDER — NICOTINE 21 MG/24HR
1 PATCH, TRANSDERMAL 24 HOURS TRANSDERMAL EVERY 24 HOURS
Qty: 28 PATCH | Refills: 2 | Status: SHIPPED | OUTPATIENT
Start: 2021-02-04

## 2021-02-04 RX ORDER — ATORVASTATIN CALCIUM 40 MG/1
40 TABLET, FILM COATED ORAL
Qty: 90 TABLET | Refills: 1 | Status: SHIPPED | OUTPATIENT
Start: 2021-02-04 | End: 2021-05-20 | Stop reason: SDUPTHER

## 2021-02-04 RX ORDER — LISINOPRIL 20 MG/1
20 TABLET ORAL DAILY
Qty: 90 TABLET | Refills: 1 | Status: SHIPPED | OUTPATIENT
Start: 2021-02-04 | End: 2021-05-20 | Stop reason: SDUPTHER

## 2021-02-04 NOTE — PROGRESS NOTES
Assessment/Plan:    Type 2 diabetes mellitus without complication, without long-term current use of insulin (HCC)    Lab Results   Component Value Date    HGBA1C 7 2 (A) 02/04/2021       - continue metformin 500 mg b i d   - repeat A1c in 3 months  - no signs of hypoglycemia  HTN, goal below 140/90  - controlled  - continue lisinopril 20 mg daily    Tobacco user  Counseled patient on the importance of smoking cessation  Counseling time was over 3 minutes  Patient acknowledges an understanding of the risks of smoking  At the very least, patient is agreeable to try cutting down  Discussed outside services to help with quitting smoking  Discussed the specifics of using nicotine patches, on a tapering schedule,       after stopping smoking  Tapering can last 6 months  While on the nicotine patches, I instructed on the use of PRN nicotine gum,       instead of reaching for a cigarette, in a moment of weakness  Mixed hyperlipidemia  -continue atorvastatin 40 mg daily  - repeat lipid panel follow-up visit  Diagnoses and all orders for this visit:    Type 2 diabetes mellitus without complication, without long-term current use of insulin (HCC)  -     POCT hemoglobin A1c  -     metFORMIN (GLUCOPHAGE) 500 mg tablet; Take 1 tablet (500 mg total) by mouth 2 (two) times a day with meals    HTN, goal below 140/90    Mixed hyperlipidemia  -     atorvastatin (LIPITOR) 40 mg tablet; Take 1 tablet (40 mg total) by mouth daily at bedtime    Essential hypertension  -     aspirin (Aspirin Low Dose) 81 mg EC tablet; Take 1 tablet (81 mg total) by mouth daily  -     lisinopril (ZESTRIL) 20 mg tablet; Take 1 tablet (20 mg total) by mouth daily    Tobacco user  -     nicotine (NICODERM CQ) 21 mg/24 hr TD 24 hr patch; Place 1 patch on the skin every 24 hours  -     nicotine polacrilex (NICORETTE) 2 mg gum;  Chew 1 each (2 mg total) as needed for smoking cessation    Need for pneumococcal vaccination  -     PNEUMOCOCCAL POLYSACCHARIDE VACCINE 23-VALENT =>3YO SQ IM          Subjective:      Patient ID: Lisa Maurice is a 60 y o  male  This is a pleasant 76-year-old male with known past medical history of systolic CHF, hypertension, hyperlipidemia and tobacco abuse who presents to the clinic for follow-up visit  Patient reports he is doing well and is compliant with all of his medications  Otherwise, he reports that he continues to smoke couple of cigarettes a day and continues to drink every day  No other complaints at the moment  The following portions of the patient's history were reviewed and updated as appropriate:   He  has a past medical history of HLD (hyperlipidemia) and Hypertension  He   Patient Active Problem List    Diagnosis Date Noted    Colon cancer screening 08/20/2020    Callus of foot 08/20/2020    Type 2 diabetes mellitus without complication, without long-term current use of insulin ([facility] ) 03/20/2019    HTN, goal below 140/90 02/27/2018    Mixed hyperlipidemia 01/37/7555    Systolic heart failure ([facility] [address] ) 02/27/2018    Tobacco user 02/27/2018     He  has no past surgical history on file  His family history is not on file  He  reports that he has been smoking cigarettes  He has been smoking about 0 50 packs per day  He uses smokeless tobacco  He reports current alcohol use  He reports previous drug use    Current Outpatient Medications   Medication Sig Dispense Refill    aspirin (Aspirin Low Dose) 81 mg EC tablet Take 1 tablet (81 mg total) by mouth daily 90 tablet 2    atorvastatin (LIPITOR) 40 mg tablet Take 1 tablet (40 mg total) by mouth daily at bedtime 90 tablet 1    lisinopril (ZESTRIL) 20 mg tablet Take 1 tablet (20 mg total) by mouth daily 90 tablet 1    metFORMIN (GLUCOPHAGE) 500 mg tablet Take 1 tablet (500 mg total) by mouth 2 (two) times a day with meals 60 tablet 2    nicotine (NICODERM CQ) 21 mg/24 hr TD 24 hr patch Place 1 patch on the skin every 24 hours 28 patch 2    nicotine polacrilex (NICORETTE) 2 mg gum Chew 1 each (2 mg total) as needed for smoking cessation 100 each 1     No current facility-administered medications for this visit       Review of Systems   Constitutional: Negative for fatigue and fever  HENT: Negative for congestion and sore throat  Eyes: Negative for visual disturbance  Respiratory: Negative for cough, shortness of breath and wheezing  Cardiovascular: Negative for chest pain, palpitations and leg swelling  Gastrointestinal: Negative for abdominal pain, anal bleeding, constipation, diarrhea, nausea and vomiting  Endocrine: Negative for cold intolerance and heat intolerance  Musculoskeletal: Negative for arthralgias and joint swelling  Skin: Negative for color change  Neurological: Negative for dizziness, seizures, syncope, weakness and light-headedness  Psychiatric/Behavioral: Negative for agitation, confusion, sleep disturbance and suicidal ideas  Objective:      /82 (BP Location: Left arm, Patient Position: Sitting, Cuff Size: Standard)   Pulse 101   Temp 97 6 °F (36 4 °C) (Temporal)   Resp 18   Ht 5' 5" (1 651 m)   Wt 70 3 kg (155 lb)   SpO2 98%   BMI 25 79 kg/m²          Physical Exam  Constitutional:       Appearance: He is well-developed  HENT:      Head: Normocephalic and atraumatic  Eyes:      Conjunctiva/sclera: Conjunctivae normal       Pupils: Pupils are equal, round, and reactive to light  Neck:      Musculoskeletal: Normal range of motion and neck supple  Vascular: No JVD  Cardiovascular:      Rate and Rhythm: Normal rate and regular rhythm  Heart sounds: Normal heart sounds  No murmur  No friction rub  No gallop  Pulmonary:      Effort: Pulmonary effort is normal  No respiratory distress  Breath sounds: Normal breath sounds  No wheezing  Abdominal:      General: Bowel sounds are normal  There is no distension  Palpations: Abdomen is soft  Tenderness:  There is no abdominal tenderness  Musculoskeletal: Normal range of motion  Skin:     General: Skin is warm and dry  Neurological:      Mental Status: He is alert and oriented to person, place, and time  Deep Tendon Reflexes: Reflexes are normal and symmetric  Psychiatric:         Behavior: Behavior normal          Thought Content:  Thought content normal          Judgment: Judgment normal

## 2021-02-04 NOTE — ASSESSMENT & PLAN NOTE
Lab Results   Component Value Date    HGBA1C 7 2 (A) 02/04/2021       - continue metformin 500 mg b i d   - repeat A1c in 3 months  - no signs of hypoglycemia

## 2021-05-20 ENCOUNTER — APPOINTMENT (OUTPATIENT)
Dept: LAB | Facility: CLINIC | Age: 66
End: 2021-05-20
Payer: COMMERCIAL

## 2021-05-20 ENCOUNTER — OFFICE VISIT (OUTPATIENT)
Dept: FAMILY MEDICINE CLINIC | Facility: CLINIC | Age: 66
End: 2021-05-20

## 2021-05-20 VITALS
BODY MASS INDEX: 26.93 KG/M2 | RESPIRATION RATE: 18 BRPM | OXYGEN SATURATION: 98 % | HEIGHT: 63 IN | HEART RATE: 101 BPM | TEMPERATURE: 97.5 F | SYSTOLIC BLOOD PRESSURE: 136 MMHG | DIASTOLIC BLOOD PRESSURE: 90 MMHG | WEIGHT: 152 LBS

## 2021-05-20 DIAGNOSIS — E78.2 MIXED HYPERLIPIDEMIA: ICD-10-CM

## 2021-05-20 DIAGNOSIS — I10 HTN, GOAL BELOW 140/90: ICD-10-CM

## 2021-05-20 DIAGNOSIS — J30.2 SEASONAL ALLERGIES: ICD-10-CM

## 2021-05-20 DIAGNOSIS — Z11.59 NEED FOR HEPATITIS C SCREENING TEST: ICD-10-CM

## 2021-05-20 DIAGNOSIS — I10 ESSENTIAL HYPERTENSION: ICD-10-CM

## 2021-05-20 DIAGNOSIS — E11.9 TYPE 2 DIABETES MELLITUS WITHOUT COMPLICATION, WITHOUT LONG-TERM CURRENT USE OF INSULIN (HCC): Primary | ICD-10-CM

## 2021-05-20 LAB
ANION GAP SERPL CALCULATED.3IONS-SCNC: 6 MMOL/L (ref 4–13)
BUN SERPL-MCNC: 9 MG/DL (ref 5–25)
CALCIUM SERPL-MCNC: 9.4 MG/DL (ref 8.3–10.1)
CHLORIDE SERPL-SCNC: 102 MMOL/L (ref 100–108)
CHOLEST SERPL-MCNC: 215 MG/DL (ref 50–200)
CO2 SERPL-SCNC: 29 MMOL/L (ref 21–32)
CREAT SERPL-MCNC: 0.87 MG/DL (ref 0.6–1.3)
GFR SERPL CREATININE-BSD FRML MDRD: 105 ML/MIN/1.73SQ M
GLUCOSE P FAST SERPL-MCNC: 167 MG/DL (ref 65–99)
HCV AB SER QL: NORMAL
HDLC SERPL-MCNC: 42 MG/DL
LDLC SERPL CALC-MCNC: 132 MG/DL (ref 0–100)
NONHDLC SERPL-MCNC: 173 MG/DL
POTASSIUM SERPL-SCNC: 4 MMOL/L (ref 3.5–5.3)
SL AMB POCT HEMOGLOBIN AIC: 7.1 (ref ?–6.5)
SODIUM SERPL-SCNC: 137 MMOL/L (ref 136–145)
TRIGL SERPL-MCNC: 205 MG/DL

## 2021-05-20 PROCEDURE — 86803 HEPATITIS C AB TEST: CPT

## 2021-05-20 PROCEDURE — 80048 BASIC METABOLIC PNL TOTAL CA: CPT

## 2021-05-20 PROCEDURE — 80061 LIPID PANEL: CPT

## 2021-05-20 PROCEDURE — 99214 OFFICE O/P EST MOD 30 MIN: CPT | Performed by: FAMILY MEDICINE

## 2021-05-20 PROCEDURE — 83036 HEMOGLOBIN GLYCOSYLATED A1C: CPT | Performed by: FAMILY MEDICINE

## 2021-05-20 PROCEDURE — 36415 COLL VENOUS BLD VENIPUNCTURE: CPT

## 2021-05-20 RX ORDER — FLUTICASONE PROPIONATE 50 MCG
1 SPRAY, SUSPENSION (ML) NASAL DAILY
Qty: 11.1 ML | Refills: 1 | Status: SHIPPED | OUTPATIENT
Start: 2021-05-20

## 2021-05-20 RX ORDER — ATORVASTATIN CALCIUM 40 MG/1
40 TABLET, FILM COATED ORAL
Qty: 90 TABLET | Refills: 1 | Status: SHIPPED | OUTPATIENT
Start: 2021-05-20 | End: 2022-04-19 | Stop reason: SDUPTHER

## 2021-05-20 RX ORDER — LISINOPRIL 20 MG/1
20 TABLET ORAL DAILY
Qty: 90 TABLET | Refills: 1 | Status: SHIPPED | OUTPATIENT
Start: 2021-05-20 | End: 2022-04-19 | Stop reason: SDUPTHER

## 2021-05-20 RX ORDER — ASPIRIN 81 MG/1
81 TABLET ORAL DAILY
Qty: 90 TABLET | Refills: 2 | Status: SHIPPED | OUTPATIENT
Start: 2021-05-20 | End: 2022-04-19 | Stop reason: SDUPTHER

## 2021-05-20 RX ORDER — FEXOFENADINE HCL 180 MG/1
180 TABLET ORAL DAILY
Qty: 30 TABLET | Refills: 0 | Status: SHIPPED | OUTPATIENT
Start: 2021-05-20

## 2021-05-20 NOTE — PROGRESS NOTES
Assessment/Plan:    Type 2 diabetes mellitus without complication, without long-term current use of insulin (HCC)    Lab Results   Component Value Date    HGBA1C 7 1 (A) 05/20/2021       - continue metformin 500 mg b i d   - repeat A1c in 3 months  - no signs of hypoglycemia  HTN, goal below 140/90  - controlled  - continue lisinopril 20 mg daily    Seasonal allergies  - start Allegra 180 mg once daily  - start Flonase 2 puffs each nostril once daily  Mixed hyperlipidemia  -continue atorvastatin 40 mg daily  - repeat lipid panel follow-up visit  Diagnoses and all orders for this visit:    Type 2 diabetes mellitus without complication, without long-term current use of insulin (HCC)  -     POCT hemoglobin A1c  -     metFORMIN (GLUCOPHAGE) 500 mg tablet; Take 1 tablet (500 mg total) by mouth 2 (two) times a day with meals    HTN, goal below 140/90  -     Basic metabolic panel; Future    Mixed hyperlipidemia  -     Lipid panel; Future  -     atorvastatin (LIPITOR) 40 mg tablet; Take 1 tablet (40 mg total) by mouth daily at bedtime    Need for hepatitis C screening test  -     Hepatitis C antibody; Future    Seasonal allergies  -     fexofenadine (ALLEGRA) 180 MG tablet; Take 1 tablet (180 mg total) by mouth daily  -     fluticasone (FLONASE) 50 mcg/act nasal spray; 1 spray into each nostril daily    Essential hypertension  -     lisinopril (ZESTRIL) 20 mg tablet; Take 1 tablet (20 mg total) by mouth daily  -     aspirin (Aspirin Low Dose) 81 mg EC tablet; Take 1 tablet (81 mg total) by mouth daily          Subjective:      Patient ID: Ovi Gutierrez is a 60 y o  male  This is a pleasant 59-year-old male with known past medical history of systolic CHF, hypertension, hyperlipidemia and tobacco abuse who presents to the clinic for follow-up visit  Patient reports he is doing well and is compliant with all of his medications  Reports that he is experiencing allergies for the past several days    He states that he has eye redness and itchiness  He also is experiencing not being able to breathe properly from 1 of his nostrils  He is using eyedrops but they are not working  Otherwise, he reports that he continues to smoke couple of cigarettes a day and continues to drink every day  No other complaints at the moment  The following portions of the patient's history were reviewed and updated as appropriate:   He  has a past medical history of HLD (hyperlipidemia) and Hypertension  He   Patient Active Problem List    Diagnosis Date Noted    Need for hepatitis C screening test 05/20/2021    Seasonal allergies 05/20/2021    Colon cancer screening 08/20/2020    Callus of foot 08/20/2020    Type 2 diabetes mellitus without complication, without long-term current use of insulin ([facility] [address] ) 03/20/2019    HTN, goal below 140/90 02/27/2018    Mixed hyperlipidemia 99/04/9287    Systolic heart failure ([facility] ) 02/27/2018    Tobacco user 02/27/2018     He  has no past surgical history on file  His family history is not on file  He  reports that he has been smoking cigarettes  He has been smoking about 0 50 packs per day  He uses smokeless tobacco  He reports current alcohol use  He reports previous drug use    Current Outpatient Medications   Medication Sig Dispense Refill    aspirin (Aspirin Low Dose) 81 mg EC tablet Take 1 tablet (81 mg total) by mouth daily 90 tablet 2    atorvastatin (LIPITOR) 40 mg tablet Take 1 tablet (40 mg total) by mouth daily at bedtime 90 tablet 1    fexofenadine (ALLEGRA) 180 MG tablet Take 1 tablet (180 mg total) by mouth daily 30 tablet 0    fluticasone (FLONASE) 50 mcg/act nasal spray 1 spray into each nostril daily 11 1 mL 1    lisinopril (ZESTRIL) 20 mg tablet Take 1 tablet (20 mg total) by mouth daily 90 tablet 1    metFORMIN (GLUCOPHAGE) 500 mg tablet Take 1 tablet (500 mg total) by mouth 2 (two) times a day with meals 60 tablet 2    nicotine (NICODERM CQ) 21 mg/24 hr TD 24 hr patch Place 1 patch on the skin every 24 hours 28 patch 2    nicotine polacrilex (NICORETTE) 2 mg gum Chew 1 each (2 mg total) as needed for smoking cessation 100 each 1     No current facility-administered medications for this visit       Review of Systems   Constitutional: Negative for fatigue and fever  HENT: Negative for congestion and sore throat  Eyes: Positive for redness and itching  Negative for visual disturbance  Respiratory: Negative for cough, shortness of breath and wheezing  Cardiovascular: Negative for chest pain, palpitations and leg swelling  Gastrointestinal: Negative for abdominal pain, anal bleeding, constipation, diarrhea, nausea and vomiting  Endocrine: Negative for cold intolerance and heat intolerance  Musculoskeletal: Negative for arthralgias and joint swelling  Skin: Negative for color change  Neurological: Negative for dizziness, seizures, syncope, weakness and light-headedness  Psychiatric/Behavioral: Negative for agitation, confusion, sleep disturbance and suicidal ideas  Objective:      /90 (BP Location: Left arm, Patient Position: Sitting, Cuff Size: Standard)   Pulse 101   Temp 97 5 °F (36 4 °C) (Temporal)   Resp 18   Ht 5' 3" (1 6 m)   Wt 68 9 kg (152 lb)   SpO2 98%   BMI 26 93 kg/m²          Physical Exam  Constitutional:       Appearance: He is well-developed  HENT:      Head: Normocephalic and atraumatic  Nose:      Left Turbinates: Enlarged and swollen  Eyes:      Conjunctiva/sclera:      Right eye: Right conjunctiva is injected  Left eye: Left conjunctiva is injected  Pupils: Pupils are equal, round, and reactive to light  Neck:      Musculoskeletal: Normal range of motion and neck supple  Vascular: No JVD  Cardiovascular:      Rate and Rhythm: Normal rate and regular rhythm  Heart sounds: Normal heart sounds  No murmur  No friction rub  No gallop      Pulmonary:      Effort: Pulmonary effort is normal  No respiratory distress  Breath sounds: Normal breath sounds  No wheezing  Abdominal:      General: Bowel sounds are normal  There is no distension  Palpations: Abdomen is soft  Tenderness: There is no abdominal tenderness  Musculoskeletal: Normal range of motion  Skin:     General: Skin is warm and dry  Neurological:      Mental Status: He is alert and oriented to person, place, and time  Deep Tendon Reflexes: Reflexes are normal and symmetric  Psychiatric:         Behavior: Behavior normal          Thought Content: Thought content normal          Judgment: Judgment normal          BMI Counseling: Body mass index is 26 93 kg/m²  The BMI is above normal  Nutrition recommendations include reducing portion sizes, decreasing overall calorie intake and 3-5 servings of fruits/vegetables daily

## 2021-05-20 NOTE — ASSESSMENT & PLAN NOTE
Lab Results   Component Value Date    HGBA1C 7 1 (A) 05/20/2021       - continue metformin 500 mg b i d   - repeat A1c in 3 months  - no signs of hypoglycemia

## 2022-04-19 ENCOUNTER — OFFICE VISIT (OUTPATIENT)
Dept: FAMILY MEDICINE CLINIC | Facility: CLINIC | Age: 67
End: 2022-04-19

## 2022-04-19 VITALS
WEIGHT: 155 LBS | OXYGEN SATURATION: 96 % | HEART RATE: 110 BPM | BODY MASS INDEX: 27.46 KG/M2 | SYSTOLIC BLOOD PRESSURE: 134 MMHG | DIASTOLIC BLOOD PRESSURE: 80 MMHG | TEMPERATURE: 97.6 F | RESPIRATION RATE: 16 BRPM

## 2022-04-19 DIAGNOSIS — E11.9 TYPE 2 DIABETES MELLITUS WITHOUT COMPLICATION, WITHOUT LONG-TERM CURRENT USE OF INSULIN (HCC): Primary | ICD-10-CM

## 2022-04-19 DIAGNOSIS — E78.2 MIXED HYPERLIPIDEMIA: ICD-10-CM

## 2022-04-19 DIAGNOSIS — I10 ESSENTIAL HYPERTENSION: ICD-10-CM

## 2022-04-19 LAB — SL AMB POCT HEMOGLOBIN AIC: 7.1 (ref ?–6.5)

## 2022-04-19 PROCEDURE — 83036 HEMOGLOBIN GLYCOSYLATED A1C: CPT | Performed by: INTERNAL MEDICINE

## 2022-04-19 PROCEDURE — 3079F DIAST BP 80-89 MM HG: CPT | Performed by: INTERNAL MEDICINE

## 2022-04-19 PROCEDURE — 3075F SYST BP GE 130 - 139MM HG: CPT | Performed by: INTERNAL MEDICINE

## 2022-04-19 PROCEDURE — 99213 OFFICE O/P EST LOW 20 MIN: CPT | Performed by: INTERNAL MEDICINE

## 2022-04-19 RX ORDER — ASPIRIN 81 MG/1
81 TABLET ORAL DAILY
Qty: 90 TABLET | Refills: 2 | Status: SHIPPED | OUTPATIENT
Start: 2022-04-19

## 2022-04-19 RX ORDER — LISINOPRIL 20 MG/1
20 TABLET ORAL DAILY
Qty: 90 TABLET | Refills: 1 | Status: SHIPPED | OUTPATIENT
Start: 2022-04-19

## 2022-04-19 RX ORDER — ATORVASTATIN CALCIUM 40 MG/1
40 TABLET, FILM COATED ORAL
Qty: 90 TABLET | Refills: 1 | Status: SHIPPED | OUTPATIENT
Start: 2022-04-19

## 2022-04-19 NOTE — ASSESSMENT & PLAN NOTE
Lab Results   Component Value Date    HGBA1C 7 1 (A) 04/19/2022     Patient has not followed up and has no more refills  Patient presents today for medication refill of metformin  Hemoglobin A1c has stayed steady at 7 1  Patient currently has no symptoms of episodes of hypoglycemia    Plan:   - Continue medication; Metformin 500 mg BID   - Monofilament test showed no feeling in all areas  - will put in a referral for Podiatry and Ophthalmology as patient has not gone and a few years

## 2022-04-19 NOTE — PROGRESS NOTES
Assessment/Plan:    Type 2 diabetes mellitus without complication, without long-term current use of insulin ([facility])    Lab Results   Component Value Date    HGBA1C 7 1 (A) 04/19/2022     Patient has not followed up and has no more refills  Patient presents today for medication refill of metformin  Hemoglobin A1c has stayed steady at 7 1  Patient currently has no symptoms of episodes of hypoglycemia    Plan:   - Continue medication; Metformin 500 mg BID   - Monofilament test showed no feeling in all areas  - will put in a referral for Podiatry and Ophthalmology as patient has not gone and a few years  Systolic heart failure (HCC)  Wt Readings from Last 3 Encounters:   04/19/22 70 3 kg (155 lb)   05/20/21 68 9 kg (152 lb)   02/04/21 70 3 kg (155 lb)       Patient is not currently on any medication  Patient does not have any echo on file  Patient denies any symptoms of shortness of breath, however, patient does breathe heavy when actively doing tasks  Patient does have tachycardia, however patient generally been is tachycardic  Plan:  Consider echo and cardiac workup  Consider PFTs as patient had also is a smoker, 5-6 cigarettes daily         Diagnoses and all orders for this visit:    Type 2 diabetes mellitus without complication, without long-term current use of insulin ([facility] [place] [facility] )  -     POCT hemoglobin A1c  -     Ambulatory Referral to Ophthalmology; Future  -     Ambulatory Referral to Podiatry; Future  -     metFORMIN (GLUCOPHAGE) 500 mg tablet; Take 1 tablet (500 mg total) by mouth 2 (two) times a day with meals    Mixed hyperlipidemia  -     atorvastatin (LIPITOR) 40 mg tablet; Take 1 tablet (40 mg total) by mouth daily at bedtime    Essential hypertension  -     lisinopril (ZESTRIL) 20 mg tablet; Take 1 tablet (20 mg total) by mouth daily  -     aspirin (Aspirin Low Dose) 81 mg EC tablet; Take 1 tablet (81 mg total) by mouth daily          Subjective:      Patient ID:  Annabel is a 73 y o  male     Patient is a 62-year-old male with past medical history diabetes mellitus type 2  Patient returns today after a year for medication refill and recheck of diabetes  Patient has not had any medical complaints at this time  The following portions of the patient's history were reviewed and updated as appropriate: allergies, current medications, past family history, past medical history, past social history, past surgical history and problem list     Review of Systems      Objective:  /80 (BP Location: Left arm, Patient Position: Sitting, Cuff Size: Adult)   Pulse (!) 110   Temp 97 6 °F (36 4 °C) (Temporal)   Resp 16   Wt 70 3 kg (155 lb)   SpO2 96%   BMI 27 46 kg/m²      Physical Exam  Constitutional:       Appearance: Normal appearance  He is normal weight  HENT:      Head: Normocephalic and atraumatic  Cardiovascular:      Rate and Rhythm: Regular rhythm  Tachycardia present  Pulses: Normal pulses  Heart sounds: Normal heart sounds  Pulmonary:      Effort: Pulmonary effort is normal       Breath sounds: Normal breath sounds  Feet:      Right foot:      Skin integrity: Callus and dry skin present  Left foot:      Skin integrity: Callus and dry skin present  Toenail Condition: Left toenails are abnormally thick and long  Comments: Did not feel any sensation with the monofilament test b/l feet   Neurological:      Mental Status: He is alert

## 2022-04-20 NOTE — ASSESSMENT & PLAN NOTE
Wt Readings from Last 3 Encounters:   04/19/22 70 3 kg (155 lb)   05/20/21 68 9 kg (152 lb)   02/04/21 70 3 kg (155 lb)       Patient is not currently on any medication  Patient does not have any echo on file  Patient denies any symptoms of shortness of breath, however, patient does breathe heavy when actively doing tasks  Patient does have tachycardia, however patient generally been is tachycardic      Plan:  Consider echo and cardiac workup  Consider PFTs as patient had also is a smoker, 5-6 cigarettes daily

## 2022-06-22 LAB
LEFT EYE DIABETIC RETINOPATHY: NORMAL
RIGHT EYE DIABETIC RETINOPATHY: NORMAL

## 2022-09-22 DIAGNOSIS — I10 ESSENTIAL HYPERTENSION: ICD-10-CM

## 2022-09-22 NOTE — TELEPHONE ENCOUNTER
Pt came to our office requesting medication refills on;       aspirin (Aspirin Low Dose) 81 mg EC tablet      lisinopril (ZESTRIL) 20 mg tablet

## 2022-09-23 RX ORDER — LISINOPRIL 20 MG/1
20 TABLET ORAL DAILY
Qty: 90 TABLET | Refills: 1 | Status: SHIPPED | OUTPATIENT
Start: 2022-09-23

## 2022-09-23 RX ORDER — ASPIRIN 81 MG/1
81 TABLET ORAL DAILY
Qty: 90 TABLET | Refills: 2 | OUTPATIENT
Start: 2022-09-23

## 2022-10-12 PROBLEM — Z11.59 NEED FOR HEPATITIS C SCREENING TEST: Status: RESOLVED | Noted: 2021-05-20 | Resolved: 2022-10-12

## 2023-03-27 DIAGNOSIS — I10 ESSENTIAL HYPERTENSION: ICD-10-CM

## 2023-03-27 DIAGNOSIS — E11.9 TYPE 2 DIABETES MELLITUS WITHOUT COMPLICATION, WITHOUT LONG-TERM CURRENT USE OF INSULIN (HCC): ICD-10-CM

## 2023-03-27 NOTE — TELEPHONE ENCOUNTER
Pt came to our office requesting medication refills on:       aspirin (Aspirin Low Dose) 81 mg EC tablet    metFORMIN (GLUCOPHAGE) 500 mg tablet    lisinopril (ZESTRIL) 20 mg tablet

## 2023-03-28 RX ORDER — LISINOPRIL 20 MG/1
20 TABLET ORAL DAILY
Qty: 90 TABLET | Refills: 1 | OUTPATIENT
Start: 2023-03-28

## 2023-03-28 RX ORDER — ASPIRIN 81 MG/1
81 TABLET ORAL DAILY
Qty: 90 TABLET | Refills: 2 | OUTPATIENT
Start: 2023-03-28

## 2023-03-28 NOTE — TELEPHONE ENCOUNTER
Pt no showed last 2 visits with PCP and it's almost been a year since last visit      Appt is required to refill these at this point

## 2023-04-27 ENCOUNTER — OFFICE VISIT (OUTPATIENT)
Dept: FAMILY MEDICINE CLINIC | Facility: CLINIC | Age: 68
End: 2023-04-27

## 2023-04-27 VITALS
TEMPERATURE: 96.5 F | WEIGHT: 154 LBS | HEIGHT: 63 IN | RESPIRATION RATE: 16 BRPM | BODY MASS INDEX: 27.29 KG/M2 | DIASTOLIC BLOOD PRESSURE: 78 MMHG | SYSTOLIC BLOOD PRESSURE: 122 MMHG | OXYGEN SATURATION: 97 % | HEART RATE: 90 BPM

## 2023-04-27 DIAGNOSIS — E11.9 TYPE 2 DIABETES MELLITUS WITHOUT COMPLICATION, WITHOUT LONG-TERM CURRENT USE OF INSULIN (HCC): Primary | ICD-10-CM

## 2023-04-27 DIAGNOSIS — Z12.11 COLON CANCER SCREENING: ICD-10-CM

## 2023-04-27 DIAGNOSIS — I10 HTN, GOAL BELOW 140/90: ICD-10-CM

## 2023-04-27 DIAGNOSIS — L84 CALLUS OF FOOT: ICD-10-CM

## 2023-04-27 DIAGNOSIS — E78.2 MIXED HYPERLIPIDEMIA: ICD-10-CM

## 2023-04-27 DIAGNOSIS — Z23 ENCOUNTER FOR IMMUNIZATION: ICD-10-CM

## 2023-04-27 DIAGNOSIS — I10 ESSENTIAL HYPERTENSION: ICD-10-CM

## 2023-04-27 DIAGNOSIS — J30.2 SEASONAL ALLERGIES: ICD-10-CM

## 2023-04-27 LAB — SL AMB POCT HEMOGLOBIN AIC: 8.1 (ref ?–6.5)

## 2023-04-27 RX ORDER — LISINOPRIL 20 MG/1
20 TABLET ORAL DAILY
Qty: 90 TABLET | Refills: 1 | Status: SHIPPED | OUTPATIENT
Start: 2023-04-27

## 2023-04-27 RX ORDER — FLUTICASONE PROPIONATE 50 MCG
1 SPRAY, SUSPENSION (ML) NASAL DAILY
Qty: 11.1 ML | Refills: 1 | Status: SHIPPED | OUTPATIENT
Start: 2023-04-27

## 2023-04-27 RX ORDER — ASPIRIN 81 MG/1
81 TABLET ORAL DAILY
Qty: 90 TABLET | Refills: 2 | Status: SHIPPED | OUTPATIENT
Start: 2023-04-27

## 2023-04-27 RX ORDER — ATORVASTATIN CALCIUM 40 MG/1
40 TABLET, FILM COATED ORAL
Qty: 90 TABLET | Refills: 1 | Status: SHIPPED | OUTPATIENT
Start: 2023-04-27

## 2023-04-27 RX ORDER — FEXOFENADINE HCL 180 MG/1
180 TABLET ORAL DAILY
Qty: 30 TABLET | Refills: 0 | Status: SHIPPED | OUTPATIENT
Start: 2023-04-27

## 2023-04-27 NOTE — ASSESSMENT & PLAN NOTE
Lab Results   Component Value Date    HGBA1C 8 1 (A) 04/27/2023     - Uncontrolled   - Has not taken metformin in months as ran out of refills     - Will restart metformin 500mg BID  - Repeat HBA1C in 3 months

## 2023-04-27 NOTE — PROGRESS NOTES
Name: Bladimir Jordan      :       MRN: [id number]  Encounter Provider: Michelle Puga MD  Encounter Date: 2023   Encounter department: [address] [place] [address]     1  Type 2 diabetes mellitus without complication, without long-term current use of insulin ([facility])  Assessment & Plan:    Lab Results   Component Value Date    HGBA1C 8 1 (A) 2023     - Uncontrolled   - Has not taken metformin in months as ran out of refills  - Will restart metformin 500mg BID  - Repeat HBA1C in 3 months     Orders:  -     POCT hemoglobin A1c  -     Urine Microalbumin/creatinine ratio; Future  -     metFORMIN (GLUCOPHAGE) 500 mg tablet; Take 1 tablet (500 mg total) by mouth 2 (two) times a day with meals    2  HTN, goal below 140/90  Assessment & Plan:  - controlled  - continue lisinopril 20 mg daily    Orders:  -     Basic metabolic panel; Future    3  Mixed hyperlipidemia  Assessment & Plan:  -continue atorvastatin 40 mg daily  - repeat lipid panel follow-up visit  Orders:  -     Lipid panel; Future  -     atorvastatin (LIPITOR) 40 mg tablet; Take 1 tablet (40 mg total) by mouth daily at bedtime    4  Essential hypertension  -     lisinopril (ZESTRIL) 20 mg tablet; Take 1 tablet (20 mg total) by mouth daily  -     aspirin (Aspirin Low Dose) 81 mg EC tablet; Take 1 tablet (81 mg total) by mouth daily    5  Seasonal allergies  Assessment & Plan:  - start Allegra 180 mg once daily  - start Flonase 2 puffs each nostril once daily  Orders:  -     fexofenadine (ALLEGRA) 180 MG tablet; Take 1 tablet (180 mg total) by mouth daily  -     fluticasone (FLONASE) 50 mcg/act nasal spray; 1 spray into each nostril daily    6  Encounter for immunization  -     Pneumococcal Conjugate Vaccine 20-valent (PCV20)    7  Colon cancer screening  -     Cologuard    8   Callus of foot  Assessment & Plan:  - Refer to podiatry for further evaluation    Orders:  -     Ambulatory Referral to Podiatry; Future    BMI Counseling: Body mass index is 27 28 kg/m²  The BMI is above normal  Nutrition recommendations include decreasing portion sizes, encouraging healthy choices of fruits and vegetables and decreasing fast food intake  Exercise recommendations include exercising 3-5 times per week  No pharmacotherapy was ordered  Patient referred to PCP  Rationale for BMI follow-up plan is due to patient being overweight or obese  Depression Screening and Follow-up Plan: Patient was screened for depression during today's encounter  They screened negative with a PHQ-2 score of 0  Subjective      This is a pleasant 60-year-old male with known past medical history of systolic CHF, hypertension, hyperlipidemia and tobacco abuse who presents to the clinic for follow-up visit  Patient reports he is doing well and is compliant with all of his medications  Reports that he is experiencing allergies for the past several days  He states that he has eye redness and itchiness  He also is experiencing not being able to breathe properly from 1 of his nostrils  He is using eyedrops but they are not working  Otherwise, he reports that he continues to smoke couple of cigarettes a day and continues to drink every day  No other complaints at the moment  Review of Systems   Constitutional: Negative for fever  HENT: Positive for postnasal drip, rhinorrhea and sneezing  Negative for congestion and sore throat  Eyes: Positive for redness and itching  Negative for visual disturbance  Respiratory: Negative for cough, shortness of breath and wheezing  Cardiovascular: Negative for palpitations and leg swelling  Gastrointestinal: Negative for abdominal pain, anal bleeding, constipation, diarrhea, nausea and vomiting  Endocrine: Negative for cold intolerance and heat intolerance  Musculoskeletal: Negative for arthralgias and joint swelling  Skin: Negative for color change     Neurological: "Negative for syncope and light-headedness  Psychiatric/Behavioral: Negative for agitation, sleep disturbance and suicidal ideas  Current Outpatient Medications on File Prior to Visit   Medication Sig   • nicotine (NICODERM CQ) 21 mg/24 hr TD 24 hr patch Place 1 patch on the skin every 24 hours   • nicotine polacrilex (NICORETTE) 2 mg gum Chew 1 each (2 mg total) as needed for smoking cessation   • [DISCONTINUED] aspirin (Aspirin Low Dose) 81 mg EC tablet Take 1 tablet (81 mg total) by mouth daily   • [DISCONTINUED] atorvastatin (LIPITOR) 40 mg tablet Take 1 tablet (40 mg total) by mouth daily at bedtime   • [DISCONTINUED] fexofenadine (ALLEGRA) 180 MG tablet Take 1 tablet (180 mg total) by mouth daily   • [DISCONTINUED] fluticasone (FLONASE) 50 mcg/act nasal spray 1 spray into each nostril daily   • [DISCONTINUED] lisinopril (ZESTRIL) 20 mg tablet Take 1 tablet (20 mg total) by mouth daily   • [DISCONTINUED] metFORMIN (GLUCOPHAGE) 500 mg tablet Take 1 tablet (500 mg total) by mouth 2 (two) times a day with meals       Objective     /78 (BP Location: Left arm, Patient Position: Sitting, Cuff Size: Standard)   Pulse 90   Temp (!) 96 5 °F (35 8 °C) (Temporal)   Resp 16   Ht 5' 3\" (1 6 m)   Wt 69 9 kg (154 lb)   SpO2 97%   BMI 27 28 kg/m²     Physical Exam  Constitutional:       Appearance: He is well-developed  HENT:      Head: Normocephalic and atraumatic  Nose: Mucosal edema and rhinorrhea present  Right Turbinates: Enlarged and swollen  Left Turbinates: Enlarged and swollen  Eyes:      Conjunctiva/sclera: Conjunctivae normal       Pupils: Pupils are equal, round, and reactive to light  Neck:      Vascular: No JVD  Cardiovascular:      Rate and Rhythm: Normal rate and regular rhythm  Heart sounds: Normal heart sounds  No murmur heard  No friction rub  No gallop  Pulmonary:      Effort: Pulmonary effort is normal  No respiratory distress        Breath sounds: Normal " breath sounds  No wheezing  Abdominal:      General: Bowel sounds are normal  There is no distension  Palpations: Abdomen is soft  Tenderness: There is no abdominal tenderness  Musculoskeletal:         General: Normal range of motion  Cervical back: Normal range of motion and neck supple  Skin:     General: Skin is warm and dry  Neurological:      Mental Status: He is alert and oriented to person, place, and time  Deep Tendon Reflexes: Reflexes are normal and symmetric  Psychiatric:         Behavior: Behavior normal          Thought Content:  Thought content normal          Judgment: Judgment normal        Rasta Cunningham MD

## 2023-11-13 ENCOUNTER — OFFICE VISIT (OUTPATIENT)
Dept: FAMILY MEDICINE CLINIC | Facility: CLINIC | Age: 68
End: 2023-11-13

## 2023-11-13 VITALS
SYSTOLIC BLOOD PRESSURE: 126 MMHG | HEART RATE: 96 BPM | TEMPERATURE: 97.8 F | OXYGEN SATURATION: 98 % | DIASTOLIC BLOOD PRESSURE: 80 MMHG | BODY MASS INDEX: 28.17 KG/M2 | RESPIRATION RATE: 18 BRPM | HEIGHT: 63 IN | WEIGHT: 159 LBS

## 2023-11-13 DIAGNOSIS — E78.2 MIXED HYPERLIPIDEMIA: ICD-10-CM

## 2023-11-13 DIAGNOSIS — J30.2 SEASONAL ALLERGIES: ICD-10-CM

## 2023-11-13 DIAGNOSIS — I10 HTN, GOAL BELOW 140/90: ICD-10-CM

## 2023-11-13 DIAGNOSIS — Z12.11 SCREENING FOR COLON CANCER: ICD-10-CM

## 2023-11-13 DIAGNOSIS — I10 ESSENTIAL HYPERTENSION: ICD-10-CM

## 2023-11-13 DIAGNOSIS — E11.9 TYPE 2 DIABETES MELLITUS WITHOUT COMPLICATION, WITHOUT LONG-TERM CURRENT USE OF INSULIN (HCC): Primary | ICD-10-CM

## 2023-11-13 LAB — SL AMB POCT HEMOGLOBIN AIC: 7.6 (ref ?–6.5)

## 2023-11-13 PROCEDURE — 83036 HEMOGLOBIN GLYCOSYLATED A1C: CPT | Performed by: FAMILY MEDICINE

## 2023-11-13 PROCEDURE — 99213 OFFICE O/P EST LOW 20 MIN: CPT | Performed by: FAMILY MEDICINE

## 2023-11-13 RX ORDER — ATORVASTATIN CALCIUM 40 MG/1
40 TABLET, FILM COATED ORAL
Qty: 90 TABLET | Refills: 1 | Status: SHIPPED | OUTPATIENT
Start: 2023-11-13

## 2023-11-13 RX ORDER — LISINOPRIL 20 MG/1
20 TABLET ORAL DAILY
Qty: 90 TABLET | Refills: 1 | Status: SHIPPED | OUTPATIENT
Start: 2023-11-13

## 2023-11-13 RX ORDER — FLUTICASONE PROPIONATE 50 MCG
1 SPRAY, SUSPENSION (ML) NASAL DAILY
Qty: 11.1 ML | Refills: 1 | Status: SHIPPED | OUTPATIENT
Start: 2023-11-13

## 2023-11-13 NOTE — ASSESSMENT & PLAN NOTE
Assessment   -Controlled   -On lisinopril 20 mg qd, compliant, denies side effects.   -Needs refills. Plan   Continue, refill ordered.

## 2023-11-13 NOTE — PROGRESS NOTES
Name: Denys Giles      : [birth year]      MRN: [id number]  Encounter Provider: Arlene Constantino MD  Encounter Date: 2023   Encounter department: [address] [facility]     1. Type 2 diabetes mellitus without complication, without long-term current use of insulin ([facility])  Assessment & Plan:    Lab Results   Component Value Date    HGBA1C 7.6 (A) 2023   Assessment:     Goal less than 7%, with FBG between 7-130 mg/d and 2 hr postprandial glucose of less than 180 mg/dl, poorly controlled, improving. Currently on metformin 500 bid , poor compliant. On Ace inh. Plan: Will continue current treatment. A1C goals reviewed with patient. The patient was educated on the importance of medication compliance and to monitor blood glucose at home on a daily basis  Encourage life style changes including diabetes diet and exercise. Will repeat a1c every  3 months until goal is reached, then q6 months. Follow up with pcp in 3 months. Orders:  -     POCT hemoglobin A1c  -     metFORMIN (GLUCOPHAGE) 500 mg tablet; Take 1 tablet (500 mg total) by mouth 2 (two) times a day with meals    2. Seasonal allergies  -     fluticasone (FLONASE) 50 mcg/act nasal spray; 1 spray into each nostril daily    3. Essential hypertension  -     lisinopril (ZESTRIL) 20 mg tablet; Take 1 tablet (20 mg total) by mouth daily    4. Mixed hyperlipidemia  -     atorvastatin (LIPITOR) 40 mg tablet; Take 1 tablet (40 mg total) by mouth daily at bedtime    5. Screening for colon cancer  -     Ambulatory Referral to Gastroenterology; Future    6. HTN, goal below 140/90  Assessment & Plan:  Assessment   -Controlled   -On lisinopril 20 mg qd, compliant, denies side effects.   -Needs refills. Plan   Continue, refill ordered. Subjective      Source of information: patient and EMR review.    Information obtained in Syriac   It was a pleasure to see Rhianna Anton Robyn Krishnamurthy is a 80 y.o.  male with PMH significant for HLD, HTN, DM-2, seasonal allergies. Who present for diabetes mellitus follow up. - Patient last seen back in 4/2023, during that time reported running out of his metformin. Medication was restarted. -Today patient reports that was taking metformin qd instead the recommended 500 mg bid dose  - Reports compliance with his statin and lisinopril.   - Requesting refill of his medications    - Patient reports traveling recently to [facility] and was told by his DR doctor that needs to perform his screening colonoscopy. -Previously referred to general surgery by pcp, but reports that has been unable to schedule appointment 2/2 lack of time and traveling outside the [address] [place] [address]. Patient has not had any recent hospitalizations, or medical emergencies since last visit. Overall patient reports feeling well. Patient has no further complaints other than what is mentioned in the ROS. Diabetes  He presents for his follow-up diabetic visit. He has type 2 diabetes mellitus. The initial diagnosis of diabetes was made 7 years ago. His disease course has been improving. Pertinent negatives for hypoglycemia include no confusion, dizziness, headaches, hunger, mood changes, nervousness/anxiousness, pallor, seizures, sleepiness, speech difficulty, sweats or tremors. Pertinent negatives for diabetes include no blurred vision, no chest pain, no foot ulcerations and no visual change. Pertinent negatives for hypoglycemia complications include no blackouts, no hospitalization, no nocturnal hypoglycemia, no required assistance and no required glucagon injection. Symptoms are improving. There are no diabetic complications. Risk factors for coronary artery disease include dyslipidemia, male sex and hypertension. Current diabetic treatment includes oral agent (monotherapy). He is compliant with treatment none of the time. When asked about meal planning, he reported none.  An ACE inhibitor/angiotensin II receptor blocker is being taken. He does not see a podiatrist.Eye exam is current. Review of Systems   Constitutional:  Negative for chills and fever. HENT:  Negative for ear pain and sore throat. Eyes:  Negative for blurred vision, pain and visual disturbance. Respiratory:  Negative for cough and shortness of breath. Cardiovascular:  Negative for chest pain and palpitations. Gastrointestinal:  Negative for abdominal pain and vomiting. Genitourinary:  Negative for dysuria and hematuria. Musculoskeletal:  Negative for arthralgias and back pain. Skin:  Negative for color change, pallor and rash. Neurological:  Negative for dizziness, tremors, seizures, syncope, speech difficulty and headaches. Psychiatric/Behavioral:  Negative for confusion. The patient is not nervous/anxious. All other systems reviewed and are negative.       Current Outpatient Medications on File Prior to Visit   Medication Sig    aspirin (Aspirin Low Dose) 81 mg EC tablet Take 1 tablet (81 mg total) by mouth daily    fexofenadine (ALLEGRA) 180 MG tablet Take 1 tablet (180 mg total) by mouth daily    nicotine (NICODERM CQ) 21 mg/24 hr TD 24 hr patch Place 1 patch on the skin every 24 hours    nicotine polacrilex (NICORETTE) 2 mg gum Chew 1 each (2 mg total) as needed for smoking cessation    [DISCONTINUED] atorvastatin (LIPITOR) 40 mg tablet Take 1 tablet (40 mg total) by mouth daily at bedtime    [DISCONTINUED] fluticasone (FLONASE) 50 mcg/act nasal spray 1 spray into each nostril daily    [DISCONTINUED] lisinopril (ZESTRIL) 20 mg tablet Take 1 tablet (20 mg total) by mouth daily    [DISCONTINUED] metFORMIN (GLUCOPHAGE) 500 mg tablet Take 1 tablet (500 mg total) by mouth 2 (two) times a day with meals       Objective     /80 (BP Location: Left arm, Patient Position: Sitting, Cuff Size: Standard)   Pulse 96   Temp 97.8 °F (36.6 °C) (Temporal)   Resp 18   Ht 5' 3" (1.6 m)   Wt 72.1 kg (159 lb)   SpO2 98%   BMI 28.17 kg/m²     Physical Exam  Vitals and nursing note reviewed. Constitutional:       General: He is not in acute distress. Appearance: He is well-developed and overweight. He is not ill-appearing, toxic-appearing or diaphoretic. HENT:      Head: Normocephalic and atraumatic. Eyes:      General: No scleral icterus. Extraocular Movements: Extraocular movements intact. Cardiovascular:      Rate and Rhythm: Normal rate and regular rhythm. No extrasystoles are present. Pulses:           Radial pulses are 2+ on the right side and 2+ on the left side. Heart sounds: Normal heart sounds, S1 normal and S2 normal. No murmur heard. No friction rub. No gallop. Pulmonary:      Effort: Pulmonary effort is normal. No respiratory distress. Breath sounds: Normal breath sounds and air entry. Abdominal:      General: Bowel sounds are normal.      Palpations: Abdomen is soft. There is no mass. Tenderness: There is no abdominal tenderness. There is no right CVA tenderness, left CVA tenderness, guarding or rebound. Musculoskeletal:         General: No swelling, tenderness, deformity or signs of injury. Normal range of motion. Cervical back: Normal range of motion. Right lower leg: No edema. Left lower leg: No edema. Feet:      Right foot:      Skin integrity: No ulcer, skin breakdown, erythema, warmth, callus or dry skin. Left foot:      Skin integrity: No ulcer, skin breakdown, erythema, warmth, callus or dry skin. Skin:     General: Skin is warm. Findings: No rash. Neurological:      General: No focal deficit present. Mental Status: He is alert.        Jj Soto MD

## 2023-11-13 NOTE — ASSESSMENT & PLAN NOTE
Lab Results   Component Value Date    HGBA1C 7.6 (A) 11/13/2023   Assessment:     Goal less than 7%, with FBG between 7-130 mg/d and 2 hr postprandial glucose of less than 180 mg/dl, poorly controlled, improving. Currently on metformin 500 bid , poor compliant. On Ace inh. Plan: Will continue current treatment. A1C goals reviewed with patient. The patient was educated on the importance of medication compliance and to monitor blood glucose at home on a daily basis  Encourage life style changes including diabetes diet and exercise. Will repeat a1c every  3 months until goal is reached, then q6 months. Follow up with pcp in 3 months.

## 2023-11-27 ENCOUNTER — OFFICE VISIT (OUTPATIENT)
Dept: GASTROENTEROLOGY | Facility: CLINIC | Age: 68
End: 2023-11-27
Payer: COMMERCIAL

## 2023-11-27 VITALS
SYSTOLIC BLOOD PRESSURE: 129 MMHG | BODY MASS INDEX: 28.41 KG/M2 | TEMPERATURE: 97.4 F | HEART RATE: 111 BPM | DIASTOLIC BLOOD PRESSURE: 83 MMHG | WEIGHT: 160.4 LBS

## 2023-11-27 DIAGNOSIS — Z12.11 SCREENING FOR COLON CANCER: Primary | ICD-10-CM

## 2023-11-27 PROCEDURE — 99203 OFFICE O/P NEW LOW 30 MIN: CPT | Performed by: INTERNAL MEDICINE

## 2023-11-27 RX ORDER — POLYETHYLENE GLYCOL 3350 17 G/17G
238 POWDER, FOR SOLUTION ORAL ONCE
Qty: 238 G | Refills: 0 | Status: SHIPPED | OUTPATIENT
Start: 2023-11-27 | End: 2023-11-27

## 2023-11-27 NOTE — PROGRESS NOTES
Outpatient Consultation - [address]  [facility]  Echo Zeng 80 y.o. male MRN: [id number]  Encounter: [id number]    ASSESSMENT AND PLAN:      1. Screening for colon cancer   - options regarding CRC discussed. Pt amenable to colonoscopy at this time. - Increase fiber intake to 25-30g daily  - Ambulatory Referral to Gastroenterology  - Colonoscopy; Future  - polyethylene glycol (MiraLax) 17 GM/SCOOP powder; Take 238 g by mouth once for 1 dose Take 238 g my mouth. Use as directed  Dispense: 238 g; Refill: 0    HPI:    80 y.o. male w/ a PMH of HFrEF, HTN, HLD who presents for CRC screening. Recently in [place] and was told to have some form of screening done. No prior stool testing or colonoscopy. No GI symptoms currently. Has 4 BMs per day, reports soft brown stool, usually postprandial. No diarrhea constipation. No FHx or personal Hx of IBD or CRC. REVIEW OF SYSTEMS:  Otherwise pt denies any fevers, chills, lightheadedness, dizziness, CP, palpitations, SOB, N/V/D/C, abdom pain, urinary sxs, weakness/numbness/tingling, skin changes/rashes, weight loss. Historical Information   Past Medical History:   Diagnosis Date    HLD (hyperlipidemia)     Hypertension      No past surgical history on file. No family history on file.   Social History     Tobacco Use    Smoking status: Every Day     Packs/day: 0.50     Types: Cigarettes     Passive exposure: Current    Smokeless tobacco: Current   Substance Use Topics    Alcohol use: Yes    Drug use: Not Currently     Comment: past use of Cocaine       Meds/Allergies     Current Outpatient Medications:     aspirin (Aspirin Low Dose) 81 mg EC tablet    atorvastatin (LIPITOR) 40 mg tablet    fexofenadine (ALLEGRA) 180 MG tablet    fluticasone (FLONASE) 50 mcg/act nasal spray    lisinopril (ZESTRIL) 20 mg tablet    metFORMIN (GLUCOPHAGE) 500 mg tablet    nicotine (NICODERM CQ) 21 mg/24 hr TD 24 hr patch    nicotine polacrilex (NICORETTE) 2 mg gum  Allergies   Allergen Reactions    Pollen Extract Itching    Ioversol Rash     Objective   There were no vitals taken for this visit. There is no height or weight on file to calculate BMI. PHYSICAL EXAM:    General Appearance:   Alert, cooperative, no distress   HEENT:   Normocephalic, atraumatic, anicteric. Neck:  Supple, symmetrical, trachea midline   Lungs:   Clear to auscultation bilaterally; no rales, rhonchi or wheezing; respirations unlabored    Heart:   Regular rate and rhythm; no murmur, rub, or gallop. Abdomen:   Soft, non-tender, non-distended; normal bowel sounds; no masses, no organomegaly    Genitalia:   Deferred    Rectal:   Deferred    Extremities:  No cyanosis, clubbing or edema    Pulses:  2+ and symmetric    Skin:  No jaundice, rashes, or lesions    Lymph nodes:  No palpable cervical lymphadenopathy      Lab Results:   No visits with results within 1 Day(s) from this visit. Latest known visit with results is:   Office Visit on 11/13/2023   Component Date Value    Hemoglobin A1C 11/13/2023 7.6 (A)      Radiology Results:   No results found.

## 2023-12-27 ENCOUNTER — TELEPHONE (OUTPATIENT)
Dept: GASTROENTEROLOGY | Facility: MEDICAL CENTER | Age: 68
End: 2023-12-27

## 2024-01-25 ENCOUNTER — TELEPHONE (OUTPATIENT)
Dept: GASTROENTEROLOGY | Facility: MEDICAL CENTER | Age: 69
End: 2024-01-25

## 2024-01-25 DIAGNOSIS — I50.20 SYSTOLIC HEART FAILURE, UNSPECIFIED HF CHRONICITY (HCC): ICD-10-CM

## 2024-01-25 DIAGNOSIS — Z12.11 SCREENING FOR COLON CANCER: Primary | ICD-10-CM

## 2024-01-25 NOTE — TELEPHONE ENCOUNTER
----- Message from Becca Stevens RN sent at 1/23/2024 11:16 AM EST -----  Regarding: needs for procedure  Good Morning,     Anesthesia reviewed the chart for this patient for their up coming procedure. They would like a current EKG done as well as labs that were ordering in April done before the patients procedure as noted in their procedure pass note.    Thank you for your help,    Becca BLANCO RN  Clinical Coordinator- GI Lab  [facility].

## 2024-01-29 ENCOUNTER — TELEPHONE (OUTPATIENT)
Dept: GASTROENTEROLOGY | Facility: MEDICAL CENTER | Age: 69
End: 2024-01-29

## 2024-01-29 NOTE — TELEPHONE ENCOUNTER
Confirming Upcoming Procedure: Colonoscopy on Feb 6  Physician performing: Dr. Santiago  Location of procedure:    Prep: Miralax  Diabetic    Patient confirmed appointment and prep instructions as well as diabetic medication instructions

## 2024-01-30 ENCOUNTER — APPOINTMENT (OUTPATIENT)
Dept: LAB | Facility: HOSPITAL | Age: 69
End: 2024-01-30
Payer: COMMERCIAL

## 2024-01-30 DIAGNOSIS — E11.9 TYPE 2 DIABETES MELLITUS WITHOUT COMPLICATION, WITHOUT LONG-TERM CURRENT USE OF INSULIN (HCC): ICD-10-CM

## 2024-01-30 DIAGNOSIS — E78.2 MIXED HYPERLIPIDEMIA: ICD-10-CM

## 2024-01-30 DIAGNOSIS — I50.20 SYSTOLIC HEART FAILURE, UNSPECIFIED HF CHRONICITY (HCC): ICD-10-CM

## 2024-01-30 DIAGNOSIS — I10 HTN, GOAL BELOW 140/90: ICD-10-CM

## 2024-01-30 LAB
ANION GAP SERPL CALCULATED.3IONS-SCNC: 9 MMOL/L
ATRIAL RATE: 101 BPM
BUN SERPL-MCNC: 9 MG/DL (ref 5–25)
CALCIUM SERPL-MCNC: 9.2 MG/DL (ref 8.4–10.2)
CHLORIDE SERPL-SCNC: 99 MMOL/L (ref 96–108)
CHOLEST SERPL-MCNC: 176 MG/DL
CO2 SERPL-SCNC: 28 MMOL/L (ref 21–32)
CREAT SERPL-MCNC: 0.88 MG/DL (ref 0.6–1.3)
CREAT UR-MCNC: 141.9 MG/DL
GFR SERPL CREATININE-BSD FRML MDRD: 88 ML/MIN/1.73SQ M
GLUCOSE P FAST SERPL-MCNC: 137 MG/DL (ref 65–99)
HDLC SERPL-MCNC: 59 MG/DL
LDLC SERPL CALC-MCNC: 92 MG/DL (ref 0–100)
MICROALBUMIN UR-MCNC: 65.8 MG/L
MICROALBUMIN/CREAT 24H UR: 46 MG/G CREATININE (ref 0–30)
NONHDLC SERPL-MCNC: 117 MG/DL
P AXIS: 49 DEGREES
POTASSIUM SERPL-SCNC: 3.9 MMOL/L (ref 3.5–5.3)
PR INTERVAL: 124 MS
QRS AXIS: -7 DEGREES
QRSD INTERVAL: 96 MS
QT INTERVAL: 352 MS
QTC INTERVAL: 456 MS
SODIUM SERPL-SCNC: 136 MMOL/L (ref 135–147)
T WAVE AXIS: 214 DEGREES
TRIGL SERPL-MCNC: 125 MG/DL
VENTRICULAR RATE: 101 BPM

## 2024-01-30 PROCEDURE — 80048 BASIC METABOLIC PNL TOTAL CA: CPT

## 2024-01-30 PROCEDURE — 80061 LIPID PANEL: CPT

## 2024-01-30 PROCEDURE — 36415 COLL VENOUS BLD VENIPUNCTURE: CPT

## 2024-01-30 PROCEDURE — 82043 UR ALBUMIN QUANTITATIVE: CPT

## 2024-01-30 PROCEDURE — 82570 ASSAY OF URINE CREATININE: CPT

## 2024-02-05 ENCOUNTER — ANESTHESIA EVENT (OUTPATIENT)
Dept: ANESTHESIOLOGY | Facility: HOSPITAL | Age: 69
End: 2024-02-05

## 2024-02-05 ENCOUNTER — ANESTHESIA (OUTPATIENT)
Dept: ANESTHESIOLOGY | Facility: HOSPITAL | Age: 69
End: 2024-02-05

## 2024-02-05 NOTE — ANESTHESIA PREPROCEDURE EVALUATION
Procedure:  PRE-OP ONLY    Coloscopy for CRC screening     ECG: ST    HTN - lisinopril  A1c 7.6  Relevant Problems   CARDIO   (+) HTN, goal below 140/90   (+) Mixed hyperlipidemia      ENDO   (+) Type 2 diabetes mellitus without complication, without long-term current use of insulin (HCC)             Anesthesia Plan  ASA Score- 2     Anesthesia Type- IV sedation with anesthesia with ASA Monitors.         Additional Monitors:     Airway Plan:            Plan Factors-    Chart reviewed.   Existing labs reviewed. Patient summary reviewed.                  Induction-     Postoperative Plan-     Informed Consent-

## 2024-02-06 ENCOUNTER — ANESTHESIA EVENT (OUTPATIENT)
Dept: GASTROENTEROLOGY | Facility: HOSPITAL | Age: 69
End: 2024-02-06

## 2024-02-06 ENCOUNTER — ANESTHESIA (OUTPATIENT)
Dept: GASTROENTEROLOGY | Facility: HOSPITAL | Age: 69
End: 2024-02-06

## 2024-02-06 ENCOUNTER — HOSPITAL ENCOUNTER (OUTPATIENT)
Dept: GASTROENTEROLOGY | Facility: HOSPITAL | Age: 69
Setting detail: OUTPATIENT SURGERY
Discharge: HOME | End: 2024-02-06
Payer: COMMERCIAL

## 2024-02-06 VITALS
DIASTOLIC BLOOD PRESSURE: 89 MMHG | WEIGHT: 160 LBS | TEMPERATURE: 97.5 F | HEIGHT: 63 IN | HEART RATE: 91 BPM | RESPIRATION RATE: 18 BRPM | OXYGEN SATURATION: 98 % | SYSTOLIC BLOOD PRESSURE: 141 MMHG | BODY MASS INDEX: 28.35 KG/M2

## 2024-02-06 DIAGNOSIS — Z12.11 SCREENING FOR COLON CANCER: ICD-10-CM

## 2024-02-06 LAB — GLUCOSE SERPL-MCNC: 131 MG/DL (ref 65–140)

## 2024-02-06 PROCEDURE — 88305 TISSUE EXAM BY PATHOLOGIST: CPT | Performed by: PATHOLOGY

## 2024-02-06 PROCEDURE — 45385 COLONOSCOPY W/LESION REMOVAL: CPT | Performed by: STUDENT IN AN ORGANIZED HEALTH CARE EDUCATION/TRAINING PROGRAM

## 2024-02-06 PROCEDURE — 82948 REAGENT STRIP/BLOOD GLUCOSE: CPT

## 2024-02-06 RX ORDER — ONDANSETRON 2 MG/ML
4 INJECTION INTRAMUSCULAR; INTRAVENOUS ONCE AS NEEDED
Status: CANCELLED | OUTPATIENT
Start: 2024-02-06

## 2024-02-06 RX ORDER — SODIUM CHLORIDE, SODIUM LACTATE, POTASSIUM CHLORIDE, CALCIUM CHLORIDE 600; 310; 30; 20 MG/100ML; MG/100ML; MG/100ML; MG/100ML
INJECTION, SOLUTION INTRAVENOUS CONTINUOUS PRN
Status: DISCONTINUED | OUTPATIENT
Start: 2024-02-06 | End: 2024-02-06

## 2024-02-06 RX ORDER — SODIUM CHLORIDE, SODIUM LACTATE, POTASSIUM CHLORIDE, CALCIUM CHLORIDE 600; 310; 30; 20 MG/100ML; MG/100ML; MG/100ML; MG/100ML
20 INJECTION, SOLUTION INTRAVENOUS CONTINUOUS
Status: CANCELLED | OUTPATIENT
Start: 2024-02-06

## 2024-02-06 RX ORDER — PROPOFOL 10 MG/ML
INJECTION, EMULSION INTRAVENOUS CONTINUOUS PRN
Status: DISCONTINUED | OUTPATIENT
Start: 2024-02-06 | End: 2024-02-06

## 2024-02-06 RX ORDER — PROPOFOL 10 MG/ML
INJECTION, EMULSION INTRAVENOUS AS NEEDED
Status: DISCONTINUED | OUTPATIENT
Start: 2024-02-06 | End: 2024-02-06

## 2024-02-06 RX ADMIN — PROPOFOL 120 MCG/KG/MIN: 10 INJECTION, EMULSION INTRAVENOUS at 09:45

## 2024-02-06 RX ADMIN — SODIUM CHLORIDE, SODIUM LACTATE, POTASSIUM CHLORIDE, AND CALCIUM CHLORIDE: .6; .31; .03; .02 INJECTION, SOLUTION INTRAVENOUS at 09:22

## 2024-02-06 RX ADMIN — PROPOFOL 50 MG: 10 INJECTION, EMULSION INTRAVENOUS at 09:51

## 2024-02-06 RX ADMIN — PROPOFOL 100 MG: 10 INJECTION, EMULSION INTRAVENOUS at 09:46

## 2024-02-06 RX ADMIN — PROPOFOL 50 MG: 10 INJECTION, EMULSION INTRAVENOUS at 09:48

## 2024-02-06 NOTE — ANESTHESIA PREPROCEDURE EVALUATION
Procedure:  COLONOSCOPY  ECG: ST     HTN - lisinopril  A1c 7.6  BG  Relevant Problems   CARDIO   (+) HTN, goal below 140/90   (+) Mixed hyperlipidemia      ENDO   (+) Type 2 diabetes mellitus without complication, without long-term current use of insulin (HCC)        Physical Exam    Airway    Mallampati score: II  TM Distance: >3 FB  Neck ROM: full     Dental    lower dentures and upper dentures    Cardiovascular      Pulmonary      Other Findings        Anesthesia Plan  ASA Score- 3     Anesthesia Type- IV sedation with anesthesia with ASA Monitors.         Additional Monitors:     Airway Plan:            Plan Factors-Exercise tolerance (METS): >4 METS.    Chart reviewed. EKG reviewed.  Existing labs reviewed. Patient summary reviewed.    Patient is not a current smoker.      Obstructive sleep apnea risk education given perioperatively.        Induction-     Postoperative Plan-     Informed Consent- Anesthetic plan and risks discussed with patient.  I personally reviewed this patient with the CRNA. Discussed and agreed on the Anesthesia Plan with the CRNA..

## 2024-02-06 NOTE — ANESTHESIA POSTPROCEDURE EVALUATION
Post-Op Assessment Note    CV Status:  Stable  Pain Score: 0    Pain management: adequate       Mental Status:  Sleepy   Hydration Status:  Stable   PONV Controlled:  None   Airway Patency:  Patent     Post Op Vitals Reviewed: Yes    No anethesia notable event occurred.    Staff: CRNA               BP   127/78   Temp      Pulse  86   Resp   15   SpO2   98

## 2024-02-06 NOTE — H&P
"History and Physical -  [facility]  Ruben Flores 68 y.o. male MRN: [id number]          HPI: Ruben Flores is a 68 y.o. year old male who presents for screening colonoscopy.      REVIEW OF SYSTEMS: Per the HPI, and otherwise unremarkable.    Historical Information   Past Medical History:   Diagnosis Date    HLD (hyperlipidemia)     Hypertension      History reviewed. No pertinent surgical history.  Social History   Social History     Substance and Sexual Activity   Alcohol Use Not Currently     Social History     Substance and Sexual Activity   Drug Use Not Currently    Comment: past use of Cocaine     Social History     Tobacco Use   Smoking Status Every Day    Current packs/day: 0.50    Types: Cigarettes    Passive exposure: Current   Smokeless Tobacco Former     History reviewed. No pertinent family history.    Meds/Allergies       Current Outpatient Medications:     aspirin (Aspirin Low Dose) 81 mg EC tablet    atorvastatin (LIPITOR) 40 mg tablet    lisinopril (ZESTRIL) 20 mg tablet    metFORMIN (GLUCOPHAGE) 500 mg tablet    fexofenadine (ALLEGRA) 180 MG tablet    fluticasone (FLONASE) 50 mcg/act nasal spray    nicotine (NICODERM CQ) 21 mg/24 hr TD 24 hr patch    nicotine polacrilex (NICORETTE) 2 mg gum    polyethylene glycol (MiraLax) 17 GM/SCOOP powder  No current facility-administered medications for this encounter.    Facility-Administered Medications Ordered in Other Encounters:     lactated ringers infusion, , Intravenous, Continuous PRN, New Bag at 02/06/24 0922    Allergies   Allergen Reactions    Pollen Extract Itching    Ioversol Rash       Objective     /86   Pulse 100   Temp 97.5 °F (36.4 °C) (Temporal)   Resp 17   Ht 5' 3\" (1.6 m)   Wt 72.6 kg (160 lb)   SpO2 96%   BMI 28.34 kg/m²       PHYSICAL EXAM    GEN: NAD  CARDIO: RRR  PULM: CTA bilaterally  ABD: soft, non-tender, non-distended  EXT: no lower extremity edema  NEURO: AAOx3      ASSESSMENT/PLAN:  " 68 y.o. year old male here for colonoscopy; he is stable and optimized for his procedure.

## 2024-02-09 PROCEDURE — 88305 TISSUE EXAM BY PATHOLOGIST: CPT | Performed by: PATHOLOGY

## 2024-02-21 PROBLEM — Z12.11 COLON CANCER SCREENING: Status: RESOLVED | Noted: 2020-08-20 | Resolved: 2024-02-21

## 2024-04-26 ENCOUNTER — OFFICE VISIT (OUTPATIENT)
Dept: FAMILY MEDICINE CLINIC | Facility: CLINIC | Age: 69
End: 2024-04-26

## 2024-04-26 VITALS
HEART RATE: 89 BPM | RESPIRATION RATE: 16 BRPM | HEIGHT: 63 IN | BODY MASS INDEX: 27.46 KG/M2 | TEMPERATURE: 98.3 F | SYSTOLIC BLOOD PRESSURE: 118 MMHG | DIASTOLIC BLOOD PRESSURE: 74 MMHG | WEIGHT: 155 LBS | OXYGEN SATURATION: 99 %

## 2024-04-26 DIAGNOSIS — Z13.9 DUE FOR SCREENING: ICD-10-CM

## 2024-04-26 DIAGNOSIS — I10 ESSENTIAL HYPERTENSION: ICD-10-CM

## 2024-04-26 DIAGNOSIS — Z13.6 SCREENING FOR AAA (AORTIC ABDOMINAL ANEURYSM): ICD-10-CM

## 2024-04-26 DIAGNOSIS — Z72.0 TOBACCO USER: ICD-10-CM

## 2024-04-26 DIAGNOSIS — Z12.2 SCREENING FOR LUNG CANCER: ICD-10-CM

## 2024-04-26 DIAGNOSIS — E11.9 TYPE 2 DIABETES MELLITUS WITHOUT COMPLICATION, WITHOUT LONG-TERM CURRENT USE OF INSULIN (HCC): ICD-10-CM

## 2024-04-26 DIAGNOSIS — J30.2 SEASONAL ALLERGIES: ICD-10-CM

## 2024-04-26 DIAGNOSIS — Z72.0 TOBACCO ABUSE: Primary | ICD-10-CM

## 2024-04-26 LAB — SL AMB POCT HEMOGLOBIN AIC: 6.8 (ref ?–6.5)

## 2024-04-26 PROCEDURE — 1123F ACP DISCUSS/DSCN MKR DOCD: CPT | Performed by: STUDENT IN AN ORGANIZED HEALTH CARE EDUCATION/TRAINING PROGRAM

## 2024-04-26 PROCEDURE — 83036 HEMOGLOBIN GLYCOSYLATED A1C: CPT | Performed by: STUDENT IN AN ORGANIZED HEALTH CARE EDUCATION/TRAINING PROGRAM

## 2024-04-26 PROCEDURE — G0438 PPPS, INITIAL VISIT: HCPCS | Performed by: STUDENT IN AN ORGANIZED HEALTH CARE EDUCATION/TRAINING PROGRAM

## 2024-04-26 RX ORDER — FLUTICASONE PROPIONATE 50 MCG
1 SPRAY, SUSPENSION (ML) NASAL DAILY
Qty: 11.1 ML | Refills: 1 | Status: SHIPPED | OUTPATIENT
Start: 2024-04-26

## 2024-04-26 RX ORDER — FEXOFENADINE HCL 180 MG/1
180 TABLET ORAL DAILY
Qty: 30 TABLET | Refills: 0 | Status: SHIPPED | OUTPATIENT
Start: 2024-04-26

## 2024-04-26 RX ORDER — ASPIRIN 81 MG/1
81 TABLET ORAL DAILY
Qty: 90 TABLET | Refills: 2 | Status: SHIPPED | OUTPATIENT
Start: 2024-04-26

## 2024-04-26 RX ORDER — LISINOPRIL 20 MG/1
20 TABLET ORAL DAILY
Qty: 90 TABLET | Refills: 1 | Status: SHIPPED | OUTPATIENT
Start: 2024-04-26

## 2024-04-26 NOTE — PROGRESS NOTES
Assessment and Plan:     Problem List Items Addressed This Visit       Tobacco user    Relevant Orders    CT lung screening program    Type 2 diabetes mellitus without complication, without long-term current use of insulin (HCC)    Relevant Orders    POCT hemoglobin A1c (Completed)    Screening for AAA (abdominal aortic aneurysm)    Seasonal allergies    Relevant Medications    fexofenadine (ALLEGRA) 180 MG tablet    fluticasone (FLONASE) 50 mcg/act nasal spray    Due for screening    Screening for AAA (aortic abdominal aneurysm)     -screening due          Relevant Orders    US abdominal aorta screening aaa     Other Visit Diagnoses       Tobacco abuse    -  Primary    Relevant Orders    CT lung screening program    Essential hypertension        Relevant Medications    aspirin (Aspirin Low Dose) 81 mg EC tablet    lisinopril (ZESTRIL) 20 mg tablet             Preventive health issues were discussed with patient, and age appropriate screening tests were ordered as noted in patient's After Visit Summary.  Personalized health advice and appropriate referrals for health education or preventive services given if needed, as noted in patient's After Visit Summary.     History of Present Illness:     Patient presents for a Medicare Wellness Visit    Diabetes  Pertinent negatives for hypoglycemia include no seizures. Pertinent negatives for diabetes include no chest pain.      Patient Care Team:  Fred Rodriguez MD as PCP - General (Family Medicine)  Julian Cordova DO as PCP - PCP-[facility] ([facility])  Julian Cordova DO as PCP - PCP-[employer] ([facility])  TAMEKA Contreras as PCP - PCP-[facility] ([facility])     Review of Systems:     Review of Systems   Constitutional:  Negative for chills and fever.   HENT:  Negative for ear pain and sore throat.    Eyes:  Negative for pain and visual disturbance.   Respiratory:  Negative for cough and shortness of breath.    Cardiovascular:  Negative for chest pain and  palpitations.   Gastrointestinal:  Negative for abdominal pain and vomiting.   Genitourinary:  Negative for dysuria and hematuria.   Musculoskeletal:  Negative for arthralgias and back pain.   Skin:  Negative for color change and rash.   Neurological:  Negative for seizures and syncope.   All other systems reviewed and are negative.       Problem List:     Patient Active Problem List   Diagnosis    HTN, goal below 140/90    Mixed hyperlipidemia    Systolic heart failure (HCC)    Tobacco user    Type 2 diabetes mellitus without complication, without long-term current use of insulin (HCC)    Screening for AAA (abdominal aortic aneurysm)    Callus of foot    Seasonal allergies    Due for screening    Screening for AAA (aortic abdominal aneurysm)      Past Medical and Surgical History:     Past Medical History:   Diagnosis Date    HLD (hyperlipidemia)     Hypertension      No past surgical history on file.   Family History:     No family history on file.   Social History:     Social History     Socioeconomic History    Marital status: /Civil Union     Spouse name: None    Number of children: None    Years of education: None    Highest education level: None   Occupational History    None   Tobacco Use    Smoking status: Every Day     Current packs/day: 0.50     Types: Cigarettes     Passive exposure: Current    Smokeless tobacco: Former   Vaping Use    Vaping status: Some Days    Substances: Flavoring   Substance and Sexual Activity    Alcohol use: Not Currently    Drug use: Not Currently     Comment: past use of Cocaine    Sexual activity: None   Other Topics Concern    None   Social History Narrative    None     Social Determinants of Health     Financial Resource Strain: Medium Risk (4/26/2024)    Overall Financial Resource Strain (CARDIA)     Difficulty of Paying Living Expenses: Somewhat hard   Food Insecurity: No Food Insecurity (4/26/2024)    Hunger Vital Sign     Worried About Running Out of Food in the  Last Year: Never true     Ran Out of Food in the Last Year: Never true   Transportation Needs: Unmet Transportation Needs (4/26/2024)    PRAPARE - Transportation     Lack of Transportation (Medical): Yes     Lack of Transportation (Non-Medical): No   Physical Activity: Not on file   Stress: Not on file   Social Connections: Not on file   Intimate Partner Violence: Not on file   Housing Stability: Low Risk  (4/26/2024)    Housing Stability Vital Sign     Unable to Pay for Housing in the Last Year: No     Number of Places Lived in the Last Year: 1     Unstable Housing in the Last Year: No      Medications and Allergies:     Current Outpatient Medications   Medication Sig Dispense Refill    aspirin (Aspirin Low Dose) 81 mg EC tablet Take 1 tablet (81 mg total) by mouth daily 90 tablet 2    fexofenadine (ALLEGRA) 180 MG tablet Take 1 tablet (180 mg total) by mouth daily 30 tablet 0    fluticasone (FLONASE) 50 mcg/act nasal spray 1 spray into each nostril daily 11.1 mL 1    lisinopril (ZESTRIL) 20 mg tablet Take 1 tablet (20 mg total) by mouth daily 90 tablet 1    atorvastatin (LIPITOR) 40 mg tablet Take 1 tablet (40 mg total) by mouth daily at bedtime 90 tablet 1    metFORMIN (GLUCOPHAGE) 500 mg tablet Take 1 tablet (500 mg total) by mouth 2 (two) times a day with meals 60 tablet 2    nicotine (NICODERM CQ) 21 mg/24 hr TD 24 hr patch Place 1 patch on the skin every 24 hours 28 patch 2    nicotine polacrilex (NICORETTE) 2 mg gum Chew 1 each (2 mg total) as needed for smoking cessation 100 each 1     No current facility-administered medications for this visit.     Allergies   Allergen Reactions    Pollen Extract Itching    Ioversol Rash      Immunizations:     Immunization History   Administered Date(s) Administered    COVID-19 PFIZER VACCINE 0.3 ML IM 04/29/2021, 05/20/2021    INFLUENZA 12/19/2018    Influenza, recombinant, quadrivalent,injectable, preservative free 12/19/2018    Pneumococcal Conjugate Vaccine 20-valent  (Pcv20), Polysace 04/27/2023    Tdap 12/19/2018      Health Maintenance:         Topic Date Due    Colorectal Cancer Screening  02/05/2025    Hepatitis C Screening  Completed         Topic Date Due    Influenza Vaccine (1) 09/01/2023    COVID-19 Vaccine (3 - 2023-24 season) 09/01/2023      Medicare Screening Tests and Risk Assessments:         Health Risk Assessment:   Patient rates overall health as excellent. Patient feels that their physical health rating is same. Patient is satisfied with their life. Eyesight was rated as same. Hearing was rated as same. Patient feels that their emotional and mental health rating is much better. Patients states they are never, rarely angry. Patient states they are never, rarely unusually tired/fatigued. Pain experienced in the last 7 days has been none. Patient states that he has experienced no weight loss or gain in last 6 months.     Depression Screening:   PHQ-2 Score: 0      Fall Risk Screening:   In the past year, patient has experienced: no history of falling in past year      Home Safety:  Patient does not have trouble with stairs inside or outside of their home. Patient has working smoke alarms and has working carbon monoxide detector. Home safety hazards include: none.     Nutrition:   Current diet is Regular.     Medications:   Patient is currently taking over-the-counter supplements. OTC medications include: see medication list. Patient is able to manage medications.     Activities of Daily Living (ADLs)/Instrumental Activities of Daily Living (IADLs):   Walk and transfer into and out of bed and chair?: Yes  Dress and groom yourself?: Yes    Bathe or shower yourself?: Yes    Feed yourself? Yes  Do your laundry/housekeeping?: Yes  Manage your money, pay your bills and track your expenses?: Yes  Make your own meals?: Yes    Do your own shopping?: Yes    Previous Hospitalizations:   Any hospitalizations or ED visits within the last 12 months?: No      Advance Care  "Planning:   Living will: No    Durable POA for healthcare: No    Advanced directive: No      PREVENTIVE SCREENINGS      Cardiovascular Screening:    General: Screening Not Indicated and History Lipid Disorder      Diabetes Screening:     General: Screening Not Indicated and History Diabetes      Colorectal Cancer Screening:     General: Screening Current      Abdominal Aortic Aneurysm (AAA) Screening:    Risk factors include: age between 65-74 yo and tobacco use      Due for: Screening AAA Ultrasound      Lung Cancer Screening:     General: Screening Not Indicated      Hepatitis C Screening:    General: Screening Current    Screening, Brief Intervention, and Referral to Treatment (SBIRT)    Screening    Typical number of drinks in a week: 8    Single Item Drug Screening:  How often have you used an illegal drug (including marijuana) or a prescription medication for non-medical reasons in the past year? never    Single Item Drug Screen Score: 0  Interpretation: Negative screen for possible drug use disorder    SDOH Risk Assessment  Social determinants of health (SDOH) risk assesment tool was completed. The tool at a minimum covered housing stability, food insecurity, transportation needs, and utility difficulty. Patient had at risk responses for the following SDOH domains: financial resource strain and transportation needs.     No results found.     Physical Exam:     /74 (BP Location: Left arm, Patient Position: Sitting, Cuff Size: Standard)   Pulse 89   Temp 98.3 °F (36.8 °C) (Temporal)   Resp 16   Ht 5' 3\" (1.6 m)   Wt 70.3 kg (155 lb)   SpO2 99%   BMI 27.46 kg/m²     Physical Exam     Yanira Dickey MD  "

## 2024-04-26 NOTE — PATIENT INSTRUCTIONS
Medicare Preventive Visit Patient Instructions  Thank you for completing your Welcome to Medicare Visit or Medicare Annual Wellness Visit today. Your next wellness visit will be due in one year (4/27/2025).  The screening/preventive services that you may require over the next 5-10 years are detailed below. Some tests may not apply to you based off risk factors and/or age. Screening tests ordered at today's visit but not completed yet may show as past due. Also, please note that scanned in results may not display below.  Preventive Screenings:  Service Recommendations Previous Testing/Comments   Colorectal Cancer Screening  Colonoscopy    Fecal Occult Blood Test (FOBT)/Fecal Immunochemical Test (FIT)  Fecal DNA/Cologuard Test  Flexible Sigmoidoscopy Age: 45-75 years old   Colonoscopy: every 10 years (May be performed more frequently if at higher risk)  OR  FOBT/FIT: every 1 year  OR  Cologuard: every 3 years  OR  Sigmoidoscopy: every 5 years  Screening may be recommended earlier than age 45 if at higher risk for colorectal cancer. Also, an individualized decision between you and your healthcare provider will decide whether screening between the ages of 76-85 would be appropriate. Colonoscopy: 02/06/2024  FOBT/FIT: Not on file  Cologuard: Not on file  Sigmoidoscopy: Not on file          Prostate Cancer Screening Individualized decision between patient and health care provider in men between ages of 55-69   Medicare will cover every 12 months beginning on the day after your 50th birthday PSA: No results in last 5 years           Hepatitis C Screening Once for adults born between 1945 and 1965  More frequently in patients at high risk for Hepatitis C Hep C Antibody: 05/20/2021        Diabetes Screening 1-2 times per year if you're at risk for diabetes or have pre-diabetes Fasting glucose: 137 mg/dL (1/30/2024)  A1C: 7.6 (11/13/2023)      Cholesterol Screening Once every 5 years if you don't have a lipid disorder. May  order more often based on risk factors. Lipid panel: 01/30/2024         Other Preventive Screenings Covered by Medicare:  Abdominal Aortic Aneurysm (AAA) Screening: covered once if your at risk. You're considered to be at risk if you have a family history of AAA or a male between the age of 65-75 who smoking at least 100 cigarettes in your lifetime.  Lung Cancer Screening: covers low dose CT scan once per year if you meet all of the following conditions: (1) Age 55-77; (2) No signs or symptoms of lung cancer; (3) Current smoker or have quit smoking within the last 15 years; (4) You have a tobacco smoking history of at least 20 pack years (packs per day x number of years you smoked); (5) You get a written order from a healthcare provider.  Glaucoma Screening: covered annually if you're considered high risk: (1) You have diabetes OR (2) Family history of glaucoma OR (3)  aged 50 and older OR (4)  American aged 65 and older  Osteoporosis Screening: covered every 2 years if you meet one of the following conditions: (1) Have a vertebral abnormality; (2) On glucocorticoid therapy for more than 3 months; (3) Have primary hyperparathyroidism; (4) On osteoporosis medications and need to assess response to drug therapy.  HIV Screening: covered annually if you're between the age of 15-65. Also covered annually if you are younger than 15 and older than 65 with risk factors for HIV infection. For pregnant patients, it is covered up to 3 times per pregnancy.    Immunizations:  Immunization Recommendations   Influenza Vaccine Annual influenza vaccination during flu season is recommended for all persons aged >= 6 months who do not have contraindications   Pneumococcal Vaccine   * Pneumococcal conjugate vaccine = PCV13 (Prevnar 13), PCV15 (Vaxneuvance), PCV20 (Prevnar 20)  * Pneumococcal polysaccharide vaccine = PPSV23 (Pneumovax) Adults 19-65 yo with certain risk factors or if 65+ yo  If never received any  pneumonia vaccine: recommend Prevnar 20 (PCV20)  Give PCV20 if previously received 1 dose of PCV13 or PPSV23   Hepatitis B Vaccine 3 dose series if at intermediate or high risk (ex: diabetes, end stage renal disease, liver disease)   Respiratory syncytial virus (RSV) Vaccine - COVERED BY MEDICARE PART D  * RSVPreF3 (Arexvy) CDC recommends that adults 60 years of age and older may receive a single dose of RSV vaccine using shared clinical decision-making (SCDM)   Tetanus (Td) Vaccine - COST NOT COVERED BY MEDICARE PART B Following completion of primary series, a booster dose should be given every 10 years to maintain immunity against tetanus. Td may also be given as tetanus wound prophylaxis.   Tdap Vaccine - COST NOT COVERED BY MEDICARE PART B Recommended at least once for all adults. For pregnant patients, recommended with each pregnancy.   Shingles Vaccine (Shingrix) - COST NOT COVERED BY MEDICARE PART B  2 shot series recommended in those 19 years and older who have or will have weakened immune systems or those 50 years and older     Health Maintenance Due:      Topic Date Due   • Colorectal Cancer Screening  02/05/2025   • Hepatitis C Screening  Completed     Immunizations Due:      Topic Date Due   • Influenza Vaccine (1) 09/01/2023   • COVID-19 Vaccine (3 - 2023-24 season) 09/01/2023     Advance Directives   What are advance directives?  Advance directives are legal documents that state your wishes and plans for medical care. These plans are made ahead of time in case you lose your ability to make decisions for yourself. Advance directives can apply to any medical decision, such as the treatments you want, and if you want to donate organs.   What are the types of advance directives?  There are many types of advance directives, and each state has rules about how to use them. You may choose a combination of any of the following:  Living will:  This is a written record of the treatment you want. You can also  choose which treatments you do not want, which to limit, and which to stop at a certain time. This includes surgery, medicine, IV fluid, and tube feedings.   Durable power of  for healthcare (DPAHC):  This is a written record that states who you want to make healthcare choices for you when you are unable to make them for yourself. This person, called a proxy, is usually a family member or a friend. You may choose more than 1 proxy.  Do not resuscitate (DNR) order:  A DNR order is used in case your heart stops beating or you stop breathing. It is a request not to have certain forms of treatment, such as CPR. A DNR order may be included in other types of advance directives.  Medical directive:  This covers the care that you want if you are in a coma, near death, or unable to make decisions for yourself. You can list the treatments you want for each condition. Treatment may include pain medicine, surgery, blood transfusions, dialysis, IV or tube feedings, and a ventilator (breathing machine).  Values history:  This document has questions about your views, beliefs, and how you feel and think about life. This information can help others choose the care that you would choose.  Why are advance directives important?  An advance directive helps you control your care. Although spoken wishes may be used, it is better to have your wishes written down. Spoken wishes can be misunderstood, or not followed. Treatments may be given even if you do not want them. An advance directive may make it easier for your family to make difficult choices about your care.   Cigarette Smoking and Your Health   Risks to your health if you smoke:  Nicotine and other chemicals found in tobacco damage every cell in your body. Even if you are a light smoker, you have an increased risk for cancer, heart disease, and lung disease. If you are pregnant or have diabetes, smoking increases your risk for complications.   Benefits to your health if you  stop smoking:   You decrease respiratory symptoms such as coughing, wheezing, and shortness of breath.   You reduce your risk for cancers of the lung, mouth, throat, kidney, bladder, pancreas, stomach, and cervix. If you already have cancer, you increase the benefits of chemotherapy. You also reduce your risk for cancer returning or a second cancer from developing.   You reduce your risk for heart disease, blood clots, heart attack, and stroke.   You reduce your risk for lung infections, and diseases such as pneumonia, asthma, chronic bronchitis, and emphysema.  Your circulation improves. More oxygen can be delivered to your body. If you have diabetes, you lower your risk for complications, such as kidney, artery, and eye diseases. You also lower your risk for nerve damage. Nerve damage can lead to amputations, poor vision, and blindness.  You improve your body's ability to heal and to fight infections.  For more information and support to stop smoking:   [employer]  Phone: 3- 405 - 635-1020  Web Address: www.[employer]  Weight Management   Why it is important to manage your weight:  Being overweight increases your risk of health conditions such as heart disease, high blood pressure, type 2 diabetes, and certain types of cancer. It can also increase your risk for osteoarthritis, sleep apnea, and other respiratory problems. Aim for a slow, steady weight loss. Even a small amount of weight loss can lower your risk of health problems.  How to lose weight safely:  A safe and healthy way to lose weight is to eat fewer calories and get regular exercise. You can lose up about 1 pound a week by decreasing the number of calories you eat by 500 calories each day.   Healthy meal plan for weight management:  A healthy meal plan includes a variety of foods, contains fewer calories, and helps you stay healthy. A healthy meal plan includes the following:  Eat whole-grain foods more often.  A healthy meal plan should contain  fiber. Fiber is the part of grains, fruits, and vegetables that is not broken down by your body. Whole-grain foods are healthy and provide extra fiber in your diet. Some examples of whole-grain foods are whole-wheat breads and pastas, oatmeal, brown rice, and bulgur.  Eat a variety of vegetables every day.  Include dark, leafy greens such as spinach, kale, senia greens, and mustard greens. Eat yellow and orange vegetables such as carrots, sweet potatoes, and winter squash.   Eat a variety of fruits every day.  Choose fresh or canned fruit (canned in its own juice or light syrup) instead of juice. Fruit juice has very little or no fiber.  Eat low-fat dairy foods.  Drink fat-free (skim) milk or 1% milk. Eat fat-free yogurt and low-fat cottage cheese. Try low-fat cheeses such as mozzarella and other reduced-fat cheeses.  Choose meat and other protein foods that are low in fat.  Choose beans or other legumes such as split peas or lentils. Choose fish, skinless poultry (chicken or turkey), or lean cuts of red meat (beef or pork). Before you cook meat or poultry, cut off any visible fat.   Use less fat and oil.  Try baking foods instead of frying them. Add less fat, such as margarine, sour cream, regular salad dressing and mayonnaise to foods. Eat fewer high-fat foods. Some examples of high-fat foods include french fries, doughnuts, ice cream, and cakes.  Eat fewer sweets.  Limit foods and drinks that are high in sugar. This includes candy, cookies, regular soda, and sweetened drinks.  Exercise:  Exercise at least 30 minutes per day on most days of the week. Some examples of exercise include walking, biking, dancing, and swimming. You can also fit in more physical activity by taking the stairs instead of the elevator or parking farther away from stores. Ask your healthcare provider about the best exercise plan for you.      © Copyright [employer] 2018 Information is for End User's use only and may not be sold,  redistributed or otherwise used for commercial purposes. All illustrations and images included in CareNotes® are the copyrighted property of [employer] or [employer]

## 2024-05-14 ENCOUNTER — TELEPHONE (OUTPATIENT)
Dept: FAMILY MEDICINE CLINIC | Facility: CLINIC | Age: 69
End: 2024-05-14

## 2024-05-14 DIAGNOSIS — E11.9 TYPE 2 DIABETES MELLITUS WITHOUT COMPLICATION, WITHOUT LONG-TERM CURRENT USE OF INSULIN (HCC): ICD-10-CM

## 2024-05-14 NOTE — TELEPHONE ENCOUNTER
Patient came into office today requesting medication refill:    aspirin (Aspirin Low Dose) 81 mg EC tablet   lisinopril (ZESTRIL) 20 mg tablet    metFORMIN (GLUCOPHAGE) 500 mg tablet   atorvastatin (LIPITOR) 40 mg tablet     Please advise.

## 2024-05-14 NOTE — TELEPHONE ENCOUNTER
Spoke with patient and informed him that lisinopril 20mg and aspirin 81mg were sent on 4/26/24. Pt states they do not need refills on these medications.     Pt states that they have atorvastatin 40mg left and confirmed they do not want a refill. Asked that our record showed that he is due, but Pt confirmed that he has a whole bottle left.     Metformin 500mg  sent to the pharmacy on file.

## 2024-05-26 PROBLEM — Z13.9 DUE FOR SCREENING: Status: RESOLVED | Noted: 2024-04-26 | Resolved: 2024-05-26

## 2024-05-28 ENCOUNTER — HOSPITAL ENCOUNTER (OUTPATIENT)
Dept: ULTRASOUND IMAGING | Facility: HOSPITAL | Age: 69
Discharge: HOME | End: 2024-05-28
Payer: COMMERCIAL

## 2024-05-28 DIAGNOSIS — Z13.6 SCREENING FOR AAA (AORTIC ABDOMINAL ANEURYSM): ICD-10-CM

## 2024-05-28 PROCEDURE — 76706 US ABDL AORTA SCREEN AAA: CPT

## 2024-08-31 DIAGNOSIS — J30.2 SEASONAL ALLERGIES: ICD-10-CM

## 2024-08-31 RX ORDER — FLUTICASONE PROPIONATE 50 MCG
1 SPRAY, SUSPENSION (ML) NASAL DAILY
Qty: 16 G | Refills: 0 | Status: SHIPPED | OUTPATIENT
Start: 2024-08-31

## 2024-09-03 ENCOUNTER — TELEPHONE (OUTPATIENT)
Dept: FAMILY MEDICINE CLINIC | Facility: CLINIC | Age: 69
End: 2024-09-03

## 2024-09-03 NOTE — TELEPHONE ENCOUNTER
first attempt to contact patient to rescheduled 9/26 appt due to pcp unavailable. Automated message stated phone can not be completed at this time. Will continue to try.

## 2024-11-12 DIAGNOSIS — I10 ESSENTIAL HYPERTENSION: ICD-10-CM

## 2024-11-12 DIAGNOSIS — E11.9 TYPE 2 DIABETES MELLITUS WITHOUT COMPLICATION, WITHOUT LONG-TERM CURRENT USE OF INSULIN (HCC): ICD-10-CM

## 2024-11-12 DIAGNOSIS — J30.2 SEASONAL ALLERGIES: ICD-10-CM

## 2024-11-12 NOTE — TELEPHONE ENCOUNTER
Patient came into office today requesting medication refill:      fluticasone (FLONASE) 50 mcg/act nasal spray   metFORMIN (GLUCOPHAGE) 500 mg tablet   aspirin (Aspirin Low Dose) 81 mg EC tablet   lisinopril (ZESTRIL) 20 mg tablet     Please advise. Thank you!

## 2024-11-13 ENCOUNTER — HOSPITAL ENCOUNTER (EMERGENCY)
Facility: HOSPITAL | Age: 69
Discharge: HOME | End: 2024-11-13
Attending: EMERGENCY MEDICINE
Payer: COMMERCIAL

## 2024-11-13 ENCOUNTER — APPOINTMENT (EMERGENCY)
Dept: RADIOLOGY | Facility: HOSPITAL | Age: 69
End: 2024-11-13
Payer: COMMERCIAL

## 2024-11-13 VITALS
OXYGEN SATURATION: 94 % | TEMPERATURE: 97.5 F | WEIGHT: 157.19 LBS | HEART RATE: 101 BPM | DIASTOLIC BLOOD PRESSURE: 93 MMHG | BODY MASS INDEX: 27.84 KG/M2 | SYSTOLIC BLOOD PRESSURE: 148 MMHG | RESPIRATION RATE: 18 BRPM

## 2024-11-13 DIAGNOSIS — J18.9 PNEUMONIA: Primary | ICD-10-CM

## 2024-11-13 LAB
ALBUMIN SERPL BCG-MCNC: 4.3 G/DL (ref 3.5–5)
ALP SERPL-CCNC: 91 U/L (ref 34–104)
ALT SERPL W P-5'-P-CCNC: 9 U/L (ref 7–52)
ANION GAP SERPL CALCULATED.3IONS-SCNC: 9 MMOL/L (ref 4–13)
AST SERPL W P-5'-P-CCNC: 14 U/L (ref 13–39)
ATRIAL RATE: 113 BPM
BASOPHILS # BLD AUTO: 0.03 THOUSANDS/ÂΜL (ref 0–0.1)
BASOPHILS NFR BLD AUTO: 0 % (ref 0–1)
BILIRUB SERPL-MCNC: 0.92 MG/DL (ref 0.2–1)
BNP SERPL-MCNC: 485 PG/ML (ref 0–100)
BUN SERPL-MCNC: 8 MG/DL (ref 5–25)
CALCIUM SERPL-MCNC: 9.4 MG/DL (ref 8.4–10.2)
CARDIAC TROPONIN I PNL SERPL HS: 12 NG/L (ref ?–50)
CHLORIDE SERPL-SCNC: 99 MMOL/L (ref 96–108)
CO2 SERPL-SCNC: 29 MMOL/L (ref 21–32)
CREAT SERPL-MCNC: 0.81 MG/DL (ref 0.6–1.3)
EOSINOPHIL # BLD AUTO: 0.14 THOUSAND/ÂΜL (ref 0–0.61)
EOSINOPHIL NFR BLD AUTO: 2 % (ref 0–6)
ERYTHROCYTE [DISTWIDTH] IN BLOOD BY AUTOMATED COUNT: 13.3 % (ref 11.6–15.1)
FLUAV AG UPPER RESP QL IA.RAPID: NEGATIVE
FLUBV AG UPPER RESP QL IA.RAPID: NEGATIVE
GFR SERPL CREATININE-BSD FRML MDRD: 91 ML/MIN/1.73SQ M
GLUCOSE SERPL-MCNC: 182 MG/DL (ref 65–140)
HCT VFR BLD AUTO: 42.8 % (ref 36.5–49.3)
HGB BLD-MCNC: 13.8 G/DL (ref 12–17)
IMM GRANULOCYTES # BLD AUTO: 0.04 THOUSAND/UL (ref 0–0.2)
IMM GRANULOCYTES NFR BLD AUTO: 1 % (ref 0–2)
LYMPHOCYTES # BLD AUTO: 2.07 THOUSANDS/ÂΜL (ref 0.6–4.47)
LYMPHOCYTES NFR BLD AUTO: 24 % (ref 14–44)
MCH RBC QN AUTO: 30.3 PG (ref 26.8–34.3)
MCHC RBC AUTO-ENTMCNC: 32.2 G/DL (ref 31.4–37.4)
MCV RBC AUTO: 94 FL (ref 82–98)
MONOCYTES # BLD AUTO: 0.85 THOUSAND/ÂΜL (ref 0.17–1.22)
MONOCYTES NFR BLD AUTO: 10 % (ref 4–12)
NEUTROPHILS # BLD AUTO: 5.52 THOUSANDS/ÂΜL (ref 1.85–7.62)
NEUTS SEG NFR BLD AUTO: 63 % (ref 43–75)
NRBC BLD AUTO-RTO: 0 /100 WBCS
P AXIS: 65 DEGREES
PLATELET # BLD AUTO: 295 THOUSANDS/UL (ref 149–390)
PMV BLD AUTO: 9 FL (ref 8.9–12.7)
POTASSIUM SERPL-SCNC: 3.4 MMOL/L (ref 3.5–5.3)
PR INTERVAL: 116 MS
PROT SERPL-MCNC: 8.2 G/DL (ref 6.4–8.4)
QRS AXIS: 10 DEGREES
QRSD INTERVAL: 88 MS
QT INTERVAL: 356 MS
QTC INTERVAL: 489 MS
RBC # BLD AUTO: 4.55 MILLION/UL (ref 3.88–5.62)
SARS-COV+SARS-COV-2 AG RESP QL IA.RAPID: NEGATIVE
SODIUM SERPL-SCNC: 137 MMOL/L (ref 135–147)
T WAVE AXIS: 237 DEGREES
VENTRICULAR RATE: 113 BPM
WBC # BLD AUTO: 8.65 THOUSAND/UL (ref 4.31–10.16)

## 2024-11-13 PROCEDURE — 96375 TX/PRO/DX INJ NEW DRUG ADDON: CPT

## 2024-11-13 PROCEDURE — 99285 EMERGENCY DEPT VISIT HI MDM: CPT | Performed by: EMERGENCY MEDICINE

## 2024-11-13 PROCEDURE — 93010 ELECTROCARDIOGRAM REPORT: CPT | Performed by: INTERNAL MEDICINE

## 2024-11-13 PROCEDURE — 99285 EMERGENCY DEPT VISIT HI MDM: CPT

## 2024-11-13 PROCEDURE — 87811 SARS-COV-2 COVID19 W/OPTIC: CPT | Performed by: EMERGENCY MEDICINE

## 2024-11-13 PROCEDURE — 96365 THER/PROPH/DIAG IV INF INIT: CPT

## 2024-11-13 PROCEDURE — 36415 COLL VENOUS BLD VENIPUNCTURE: CPT | Performed by: EMERGENCY MEDICINE

## 2024-11-13 PROCEDURE — 80053 COMPREHEN METABOLIC PANEL: CPT | Performed by: EMERGENCY MEDICINE

## 2024-11-13 PROCEDURE — 85025 COMPLETE CBC W/AUTO DIFF WBC: CPT | Performed by: EMERGENCY MEDICINE

## 2024-11-13 PROCEDURE — 96361 HYDRATE IV INFUSION ADD-ON: CPT

## 2024-11-13 PROCEDURE — 93005 ELECTROCARDIOGRAM TRACING: CPT

## 2024-11-13 PROCEDURE — 71045 X-RAY EXAM CHEST 1 VIEW: CPT

## 2024-11-13 PROCEDURE — 83880 ASSAY OF NATRIURETIC PEPTIDE: CPT | Performed by: EMERGENCY MEDICINE

## 2024-11-13 PROCEDURE — 87804 INFLUENZA ASSAY W/OPTIC: CPT | Performed by: EMERGENCY MEDICINE

## 2024-11-13 PROCEDURE — 84484 ASSAY OF TROPONIN QUANT: CPT | Performed by: EMERGENCY MEDICINE

## 2024-11-13 RX ORDER — FUROSEMIDE 10 MG/ML
20 INJECTION INTRAMUSCULAR; INTRAVENOUS ONCE
Status: COMPLETED | OUTPATIENT
Start: 2024-11-13 | End: 2024-11-13

## 2024-11-13 RX ORDER — AZITHROMYCIN 250 MG/1
250 TABLET, FILM COATED ORAL EVERY 24 HOURS
Qty: 4 TABLET | Refills: 0 | Status: SHIPPED | OUTPATIENT
Start: 2024-11-13 | End: 2024-11-17

## 2024-11-13 RX ORDER — CEFTRIAXONE 1 G/50ML
1000 INJECTION, SOLUTION INTRAVENOUS ONCE
Status: COMPLETED | OUTPATIENT
Start: 2024-11-13 | End: 2024-11-13

## 2024-11-13 RX ORDER — AZITHROMYCIN 250 MG/1
500 TABLET, FILM COATED ORAL ONCE
Status: COMPLETED | OUTPATIENT
Start: 2024-11-13 | End: 2024-11-13

## 2024-11-13 RX ADMIN — FUROSEMIDE 20 MG: 10 INJECTION, SOLUTION INTRAMUSCULAR; INTRAVENOUS at 09:15

## 2024-11-13 RX ADMIN — SODIUM CHLORIDE 1000 ML: 0.9 INJECTION, SOLUTION INTRAVENOUS at 07:54

## 2024-11-13 RX ADMIN — AZITHROMYCIN DIHYDRATE 500 MG: 250 TABLET ORAL at 08:47

## 2024-11-13 RX ADMIN — CEFTRIAXONE 1000 MG: 1 INJECTION, SOLUTION INTRAVENOUS at 08:49

## 2024-11-13 NOTE — ED PROVIDER NOTES
Time reflects when diagnosis was documented in both MDM as applicable and the Disposition within this note       Time User Action Codes Description Comment    11/13/2024  8:49 AM Kendall Acosta Add [J18.9] Pneumonia           ED Disposition       ED Disposition   Discharge    Condition   Stable    Date/Time   Wed Nov 13, 2024  8:49 AM    Comment   Ruben Flores discharge to home/self care.                   Assessment & Plan       Medical Decision Making  69-year-old male presenting emerged department with chest pain and cough.  Differential diagnoses pneumonia, pneumothorax, ACS.  Low risk for ACS by heart score.  Chest x-ray on my interpretation consistent with right-sided pneumonia.  Will treat with antibiotics.  PCP follow-up.  EKG on my interpretation without acute ischemia or arrhythmia.    Amount and/or Complexity of Data Reviewed  Labs: ordered.  Radiology: ordered.    Risk  Prescription drug management.             Medications   cefTRIAXone (ROCEPHIN) IVPB (premix in dextrose) 1,000 mg 50 mL (1,000 mg Intravenous New Bag 11/13/24 0849)   furosemide (LASIX) injection 20 mg (has no administration in time range)   sodium chloride 0.9 % bolus 1,000 mL (1,000 mL Intravenous New Bag 11/13/24 0754)   azithromycin (ZITHROMAX) tablet 500 mg (500 mg Oral Given 11/13/24 0847)       ED Risk Strat Scores   HEART Risk Score      Flowsheet Row Most Recent Value   Heart Score Risk Calculator    History 0 Filed at: 11/13/2024 0904   ECG 0 Filed at: 11/13/2024 0904   Age 2 Filed at: 11/13/2024 0904   Risk Factors 1 Filed at: 11/13/2024 0904   Troponin 0 Filed at: 11/13/2024 0904   HEART Score 3 Filed at: 11/13/2024 0904                                                   History of Present Illness       Chief Complaint   Patient presents with    Chest Pain     Pt reports CP, SOB, cough x 2 days.       Past Medical History:   Diagnosis Date    HLD (hyperlipidemia)     Hypertension       History reviewed. No pertinent  surgical history.   History reviewed. No pertinent family history.   Social History     Tobacco Use    Smoking status: Every Day     Current packs/day: 0.50     Types: Cigarettes     Passive exposure: Current    Smokeless tobacco: Former   Vaping Use    Vaping status: Some Days    Substances: Flavoring   Substance Use Topics    Alcohol use: Not Currently    Drug use: Not Currently     Comment: past use of Cocaine      E-Cigarette/Vaping    E-Cigarette Use Current Some Day User       E-Cigarette/Vaping Substances    Nicotine No     THC No     CBD No     Flavoring Yes     Other No     Unknown No       I have reviewed and agree with the history as documented.     69-year-old male with history of coronary artery disease presenting emerged department with chest pain for 2 days along with cough.  Notes that primarily chest pain with cough.  Pressure-like chest pain.  Coughing for the past 2 days along with nasal congestion and sore throat.        Review of Systems   Constitutional:  Negative for chills and fever.   HENT:  Positive for congestion and rhinorrhea.    Eyes:  Negative for discharge and visual disturbance.   Respiratory:  Positive for cough and shortness of breath.    Cardiovascular:  Positive for chest pain. Negative for palpitations.   Gastrointestinal:  Negative for diarrhea, nausea and vomiting.   Endocrine: Negative for polydipsia and polyphagia.   Genitourinary:  Negative for dysuria, flank pain and hematuria.   Musculoskeletal:  Positive for myalgias.   Skin:  Negative for pallor and rash.   Psychiatric/Behavioral:  Negative for confusion.            Objective       ED Triage Vitals   Temperature Pulse Blood Pressure Respirations SpO2 Patient Position - Orthostatic VS   11/13/24 0752 11/13/24 0747 11/13/24 0747 11/13/24 0747 11/13/24 0747 11/13/24 0747   97.5 °F (36.4 °C) (!) 111 (!) 172/92 20 93 % Lying      Temp Source Heart Rate Source BP Location FiO2 (%) Pain Score    11/13/24 0749 11/13/24 0747  11/13/24 0747 -- --    Tympanic Monitor Left arm        Vitals      Date and Time Temp Pulse SpO2 Resp BP Pain Score FACES Pain Rating User   11/13/24 0900 -- 101 94 % 18 148/93 -- -- TW   11/13/24 0752 97.5 °F (36.4 °C) -- -- -- -- -- -- TW   11/13/24 0747 -- 111 93 % 20 172/92 -- -- AS            Physical Exam  Vitals and nursing note reviewed.   Constitutional:       General: He is not in acute distress.     Appearance: He is well-developed.   HENT:      Head: Normocephalic and atraumatic.   Eyes:      Conjunctiva/sclera: Conjunctivae normal.   Cardiovascular:      Rate and Rhythm: Normal rate and regular rhythm.      Heart sounds: No murmur heard.  Pulmonary:      Effort: Pulmonary effort is normal. No respiratory distress.      Breath sounds: Examination of the right-lower field reveals rhonchi. Rhonchi present.   Abdominal:      Palpations: Abdomen is soft.      Tenderness: There is no abdominal tenderness.   Musculoskeletal:         General: No swelling.      Cervical back: Neck supple.   Skin:     General: Skin is warm and dry.      Capillary Refill: Capillary refill takes less than 2 seconds.   Neurological:      Mental Status: He is alert.   Psychiatric:         Mood and Affect: Mood normal.         Results Reviewed       Procedure Component Value Units Date/Time    B-Type Natriuretic Peptide(BNP) [[id number]]  (Abnormal) Collected: 11/13/24 0751    Lab Status: Final result Specimen: Blood from Arm, Right Updated: 11/13/24 0829      pg/mL     HS Troponin 0hr (reflex protocol) [[id number]]  (Normal) Collected: 11/13/24 0751    Lab Status: Final result Specimen: Blood from Arm, Right Updated: 11/13/24 0822     hs TnI 0hr 12 ng/L     HS Troponin I 2hr [[id number]]     Lab Status: No result Specimen: Blood     HS Troponin I 4hr [[id number]]     Lab Status: No result Specimen: Blood     FLU/COVID Rapid Antigen (30 min. TAT) - Preferred screening test in ED [[id number]]  (Normal) Collected: 11/13/24 0751     Lab Status: Final result Specimen: Nares from Nose Updated: 11/13/24 0821     SARS COV Rapid Antigen Negative     Influenza A Rapid Antigen Negative     Influenza B Rapid Antigen Negative    Narrative:      This test has been performed using the [employer] Brittney 2 FLU+SARS Antigen test under the Emergency Use Authorization (EUA). This test has been validated by the  and verified by the performing laboratory. The Brittney uses lateral flow immunofluorescent sandwich assay to detect SARS-COV, Influenza A and Influenza B Antigen.     The [employer] Brittney 2 SARS Antigen test does not differentiate between SARS-CoV and SARS-CoV-2.     Negative results are presumptive and may be confirmed with a molecular assay, if necessary, for patient management. Negative results do not rule out SARS-CoV-2 or influenza infection and should not be used as the sole basis for treatment or patient management decisions. A negative test result may occur if the level of antigen in a sample is below the limit of detection of this test.     Positive results are indicative of the presence of viral antigens, but do not rule out bacterial infection or co-infection with other viruses.     All test results should be used as an adjunct to clinical observations and other information available to the provider.    FOR PEDIATRIC PATIENTS - copy/paste COVID Guidelines URL to browser: [email or web address]    Comprehensive metabolic panel [[id number]]  (Abnormal) Collected: 11/13/24 0751    Lab Status: Final result Specimen: Blood from Arm, Right Updated: 11/13/24 0817     Sodium 137 mmol/L      Potassium 3.4 mmol/L      Chloride 99 mmol/L      CO2 29 mmol/L      ANION GAP 9 mmol/L      BUN 8 mg/dL      Creatinine 0.81 mg/dL      Glucose 182 mg/dL      Calcium 9.4 mg/dL      AST 14 U/L      ALT 9 U/L      Alkaline Phosphatase 91 U/L      Total Protein 8.2 g/dL      Albumin 4.3 g/dL      Total Bilirubin  0.92 mg/dL      eGFR 91 ml/min/1.73sq m     Narrative:      [employer] Kid[employer] guidelines for Chronic Kidney Disease (CKD):     Stage 1 with normal or high GFR (GFR > 90 mL/min/1.73 square meters)    Stage 2 Mild CKD (GFR = 60-89 mL/min/1.73 square meters)    Stage 3A Moderate CKD (GFR = 45-59 mL/min/1.73 square meters)    Stage 3B Moderate CKD (GFR = 30-44 mL/min/1.73 square meters)    Stage 4 Severe CKD (GFR = 15-29 mL/min/1.73 square meters)    Stage 5 End Stage CKD (GFR <15 mL/min/1.73 square meters)  Note: GFR calculation is accurate only with a steady state creatinine    CBC and differential [[id number]] Collected: 11/13/24 0751    Lab Status: Final result Specimen: Blood from Arm, Right Updated: 11/13/24 0801     WBC 8.65 Thousand/uL      RBC 4.55 Million/uL      Hemoglobin 13.8 g/dL      Hematocrit 42.8 %      MCV 94 fL      MCH 30.3 pg      MCHC 32.2 g/dL      RDW 13.3 %      MPV 9.0 fL      Platelets 295 Thousands/uL      nRBC 0 /100 WBCs      Segmented % 63 %      Immature Grans % 1 %      Lymphocytes % 24 %      Monocytes % 10 %      Eosinophils Relative 2 %      Basophils Relative 0 %      Absolute Neutrophils 5.52 Thousands/µL      Absolute Immature Grans 0.04 Thousand/uL      Absolute Lymphocytes 2.07 Thousands/µL      Absolute Monocytes 0.85 Thousand/µL      Eosinophils Absolute 0.14 Thousand/µL      Basophils Absolute 0.03 Thousands/µL             XR chest 1 view portable   Final Interpretation by Fabricio Haines MD (11/13 0818)      Pulmonary edema and/or pneumonia. Patient's smoking history is noted, and follow-up noncontrast chest is advised in 3 months.      The study was marked in EPIC for immediate notification.            Workstation performed: [id number]             Procedures    ED Medication and Procedure Management   Prior to Admission Medications   Prescriptions Last Dose Informant Patient Reported? Taking?   aspirin (Aspirin Low Dose) 81 mg EC tablet   No No   Sig:  Take 1 tablet (81 mg total) by mouth daily   atorvastatin (LIPITOR) 40 mg tablet   No No   Sig: Take 1 tablet (40 mg total) by mouth daily at bedtime   fexofenadine (ALLEGRA) 180 MG tablet   No No   Sig: Take 1 tablet (180 mg total) by mouth daily   fluticasone (FLONASE) 50 mcg/act nasal spray   No No   Sig: Use 1 spray into each nostril daily.   lisinopril (ZESTRIL) 20 mg tablet   No No   Sig: Take 1 tablet (20 mg total) by mouth daily   metFORMIN (GLUCOPHAGE) 500 mg tablet   No No   Sig: Take 1 tablet (500 mg total) by mouth 2 (two) times a day with meals   nicotine (NICODERM CQ) 21 mg/24 hr TD 24 hr patch   No No   Sig: Place 1 patch on the skin every 24 hours   nicotine polacrilex (NICORETTE) 2 mg gum   No No   Sig: Chew 1 each (2 mg total) as needed for smoking cessation      Facility-Administered Medications: None     Patient's Medications   Discharge Prescriptions    AMOXICILLIN-CLAVULANATE (AUGMENTIN) 875-125 MG PER TABLET    Take 1 tablet by mouth every 12 (twelve) hours for 7 days       Start Date: 11/13/2024End Date: 11/20/2024       Order Dose: 1 tablet       Quantity: 14 tablet    Refills: 0    AZITHROMYCIN (ZITHROMAX) 250 MG TABLET    Take 1 tablet (250 mg total) by mouth every 24 hours for 4 days       Start Date: 11/13/2024End Date: 11/17/2024       Order Dose: 250 mg       Quantity: 4 tablet    Refills: 0     No discharge procedures on file.  ED SEPSIS DOCUMENTATION   Time reflects when diagnosis was documented in both MDM as applicable and the Disposition within this note       Time User Action Codes Description Comment    11/13/2024  8:49 AM Kendall Acosta Add [J18.9] Pneumonia                  Analiat Syed Acosta MD  11/13/24 0906

## 2024-11-18 ENCOUNTER — TELEPHONE (OUTPATIENT)
Dept: FAMILY MEDICINE CLINIC | Facility: CLINIC | Age: 69
End: 2024-11-18

## 2024-11-18 NOTE — TELEPHONE ENCOUNTER
Attempted to contact pt to reschedule appt due to pcp unavailable. Phone is not in service. Appt has been cancalled.

## 2024-11-20 ENCOUNTER — APPOINTMENT (EMERGENCY)
Dept: CT IMAGING | Facility: HOSPITAL | Age: 69
DRG: 291 | End: 2024-11-20
Payer: COMMERCIAL

## 2024-11-20 ENCOUNTER — HOSPITAL ENCOUNTER (INPATIENT)
Facility: HOSPITAL | Age: 69
LOS: 4 days | Discharge: HOME | DRG: 291 | End: 2024-11-24
Admitting: FAMILY MEDICINE
Payer: COMMERCIAL

## 2024-11-20 ENCOUNTER — APPOINTMENT (EMERGENCY)
Dept: RADIOLOGY | Facility: HOSPITAL | Age: 69
DRG: 291 | End: 2024-11-20
Payer: COMMERCIAL

## 2024-11-20 DIAGNOSIS — I10 ESSENTIAL HYPERTENSION: ICD-10-CM

## 2024-11-20 DIAGNOSIS — I50.9 ACUTE CHF (CONGESTIVE HEART FAILURE) (HCC): ICD-10-CM

## 2024-11-20 DIAGNOSIS — Z72.0 TOBACCO USER: ICD-10-CM

## 2024-11-20 DIAGNOSIS — G47.00 INSOMNIA: ICD-10-CM

## 2024-11-20 DIAGNOSIS — J18.9 PNEUMONIA: ICD-10-CM

## 2024-11-20 DIAGNOSIS — I50.23 ACUTE ON CHRONIC SYSTOLIC CONGESTIVE HEART FAILURE (HCC): ICD-10-CM

## 2024-11-20 DIAGNOSIS — R06.09 DYSPNEA ON MINIMAL EXERTION: Primary | ICD-10-CM

## 2024-11-20 DIAGNOSIS — I50.9 CHF (CONGESTIVE HEART FAILURE) (HCC): ICD-10-CM

## 2024-11-20 DIAGNOSIS — J90 PLEURAL EFFUSION, BILATERAL: ICD-10-CM

## 2024-11-20 DIAGNOSIS — J30.2 SEASONAL ALLERGIES: ICD-10-CM

## 2024-11-20 LAB
2HR DELTA HS TROPONIN: -1 NG/L
4HR DELTA HS TROPONIN: -1 NG/L
ALBUMIN SERPL BCG-MCNC: 4 G/DL (ref 3.5–5)
ALP SERPL-CCNC: 92 U/L (ref 34–104)
ALT SERPL W P-5'-P-CCNC: 14 U/L (ref 7–52)
ANION GAP SERPL CALCULATED.3IONS-SCNC: 8 MMOL/L (ref 4–13)
AST SERPL W P-5'-P-CCNC: 19 U/L (ref 13–39)
ATRIAL RATE: 105 BPM
BASOPHILS # BLD AUTO: 0.03 THOUSANDS/ÂΜL (ref 0–0.1)
BASOPHILS NFR BLD AUTO: 0 % (ref 0–1)
BILIRUB SERPL-MCNC: 1.06 MG/DL (ref 0.2–1)
BILIRUB UR QL STRIP: NEGATIVE
BNP SERPL-MCNC: 564 PG/ML (ref 0–100)
BUN SERPL-MCNC: 7 MG/DL (ref 5–25)
CALCIUM SERPL-MCNC: 9 MG/DL (ref 8.4–10.2)
CARDIAC TROPONIN I PNL SERPL HS: 12 NG/L (ref ?–50)
CARDIAC TROPONIN I PNL SERPL HS: 12 NG/L (ref ?–50)
CARDIAC TROPONIN I PNL SERPL HS: 13 NG/L (ref ?–50)
CHLORIDE SERPL-SCNC: 102 MMOL/L (ref 96–108)
CLARITY UR: CLEAR
CO2 SERPL-SCNC: 29 MMOL/L (ref 21–32)
COLOR UR: NORMAL
CREAT SERPL-MCNC: 0.81 MG/DL (ref 0.6–1.3)
D DIMER PPP FEU-MCNC: 0.96 UG/ML FEU
EOSINOPHIL # BLD AUTO: 0.17 THOUSAND/ÂΜL (ref 0–0.61)
EOSINOPHIL NFR BLD AUTO: 2 % (ref 0–6)
ERYTHROCYTE [DISTWIDTH] IN BLOOD BY AUTOMATED COUNT: 12.9 % (ref 11.6–15.1)
EST. AVERAGE GLUCOSE BLD GHB EST-MCNC: 151 MG/DL
FLUAV AG UPPER RESP QL IA.RAPID: NEGATIVE
FLUBV AG UPPER RESP QL IA.RAPID: NEGATIVE
GFR SERPL CREATININE-BSD FRML MDRD: 91 ML/MIN/1.73SQ M
GLUCOSE SERPL-MCNC: 168 MG/DL (ref 65–140)
GLUCOSE SERPL-MCNC: 226 MG/DL (ref 65–140)
GLUCOSE UR STRIP-MCNC: NEGATIVE MG/DL
HBA1C MFR BLD: 6.9 %
HCT VFR BLD AUTO: 40.2 % (ref 36.5–49.3)
HGB BLD-MCNC: 13.2 G/DL (ref 12–17)
HGB UR QL STRIP.AUTO: NEGATIVE
IMM GRANULOCYTES # BLD AUTO: 0.04 THOUSAND/UL (ref 0–0.2)
IMM GRANULOCYTES NFR BLD AUTO: 1 % (ref 0–2)
KETONES UR STRIP-MCNC: NEGATIVE MG/DL
LEUKOCYTE ESTERASE UR QL STRIP: NEGATIVE
LYMPHOCYTES # BLD AUTO: 1.39 THOUSANDS/ÂΜL (ref 0.6–4.47)
LYMPHOCYTES NFR BLD AUTO: 16 % (ref 14–44)
MCH RBC QN AUTO: 30.2 PG (ref 26.8–34.3)
MCHC RBC AUTO-ENTMCNC: 32.8 G/DL (ref 31.4–37.4)
MCV RBC AUTO: 92 FL (ref 82–98)
MONOCYTES # BLD AUTO: 0.92 THOUSAND/ÂΜL (ref 0.17–1.22)
MONOCYTES NFR BLD AUTO: 11 % (ref 4–12)
NEUTROPHILS # BLD AUTO: 6.15 THOUSANDS/ÂΜL (ref 1.85–7.62)
NEUTS SEG NFR BLD AUTO: 70 % (ref 43–75)
NITRITE UR QL STRIP: NEGATIVE
NRBC BLD AUTO-RTO: 0 /100 WBCS
P AXIS: 65 DEGREES
PH UR STRIP.AUTO: 7 [PH]
PLATELET # BLD AUTO: 334 THOUSANDS/UL (ref 149–390)
PMV BLD AUTO: 9.1 FL (ref 8.9–12.7)
POTASSIUM SERPL-SCNC: 3.9 MMOL/L (ref 3.5–5.3)
PR INTERVAL: 122 MS
PROCALCITONIN SERPL-MCNC: <0.05 NG/ML
PROT SERPL-MCNC: 7.5 G/DL (ref 6.4–8.4)
PROT UR STRIP-MCNC: NEGATIVE MG/DL
QRS AXIS: -5 DEGREES
QRSD INTERVAL: 86 MS
QT INTERVAL: 370 MS
QTC INTERVAL: 489 MS
RBC # BLD AUTO: 4.37 MILLION/UL (ref 3.88–5.62)
SARS-COV+SARS-COV-2 AG RESP QL IA.RAPID: NEGATIVE
SODIUM SERPL-SCNC: 139 MMOL/L (ref 135–147)
SP GR UR STRIP.AUTO: 1 (ref 1–1.04)
T WAVE AXIS: 154 DEGREES
UROBILINOGEN UA: NEGATIVE MG/DL
VENTRICULAR RATE: 105 BPM
WBC # BLD AUTO: 8.7 THOUSAND/UL (ref 4.31–10.16)

## 2024-11-20 PROCEDURE — 90662 IIV NO PRSV INCREASED AG IM: CPT | Performed by: FAMILY MEDICINE

## 2024-11-20 PROCEDURE — 71275 CT ANGIOGRAPHY CHEST: CPT

## 2024-11-20 PROCEDURE — 99285 EMERGENCY DEPT VISIT HI MDM: CPT

## 2024-11-20 PROCEDURE — 99223 1ST HOSP IP/OBS HIGH 75: CPT | Performed by: FAMILY MEDICINE

## 2024-11-20 PROCEDURE — 84145 PROCALCITONIN (PCT): CPT

## 2024-11-20 PROCEDURE — 36415 COLL VENOUS BLD VENIPUNCTURE: CPT

## 2024-11-20 PROCEDURE — 93005 ELECTROCARDIOGRAM TRACING: CPT

## 2024-11-20 PROCEDURE — 85025 COMPLETE CBC W/AUTO DIFF WBC: CPT

## 2024-11-20 PROCEDURE — 82948 REAGENT STRIP/BLOOD GLUCOSE: CPT

## 2024-11-20 PROCEDURE — 71046 X-RAY EXAM CHEST 2 VIEWS: CPT

## 2024-11-20 PROCEDURE — 83036 HEMOGLOBIN GLYCOSYLATED A1C: CPT

## 2024-11-20 PROCEDURE — 83880 ASSAY OF NATRIURETIC PEPTIDE: CPT

## 2024-11-20 PROCEDURE — 96375 TX/PRO/DX INJ NEW DRUG ADDON: CPT

## 2024-11-20 PROCEDURE — G0008 ADMIN INFLUENZA VIRUS VAC: HCPCS | Performed by: FAMILY MEDICINE

## 2024-11-20 PROCEDURE — 85379 FIBRIN DEGRADATION QUANT: CPT

## 2024-11-20 PROCEDURE — 80053 COMPREHEN METABOLIC PANEL: CPT

## 2024-11-20 PROCEDURE — 84484 ASSAY OF TROPONIN QUANT: CPT

## 2024-11-20 PROCEDURE — 87811 SARS-COV-2 COVID19 W/OPTIC: CPT

## 2024-11-20 PROCEDURE — 93010 ELECTROCARDIOGRAM REPORT: CPT

## 2024-11-20 PROCEDURE — 96374 THER/PROPH/DIAG INJ IV PUSH: CPT

## 2024-11-20 PROCEDURE — 87804 INFLUENZA ASSAY W/OPTIC: CPT

## 2024-11-20 RX ORDER — ASPIRIN 81 MG/1
81 TABLET ORAL DAILY
Status: DISCONTINUED | OUTPATIENT
Start: 2024-11-21 | End: 2024-11-24 | Stop reason: HOSPADM

## 2024-11-20 RX ORDER — INSULIN LISPRO 100 [IU]/ML
1-5 INJECTION, SOLUTION INTRAVENOUS; SUBCUTANEOUS
Status: DISCONTINUED | OUTPATIENT
Start: 2024-11-21 | End: 2024-11-20

## 2024-11-20 RX ORDER — LISINOPRIL 20 MG/1
20 TABLET ORAL DAILY
Status: DISCONTINUED | OUTPATIENT
Start: 2024-11-21 | End: 2024-11-22

## 2024-11-20 RX ORDER — INSULIN LISPRO 100 [IU]/ML
1-5 INJECTION, SOLUTION INTRAVENOUS; SUBCUTANEOUS
Status: DISCONTINUED | OUTPATIENT
Start: 2024-11-20 | End: 2024-11-24 | Stop reason: HOSPADM

## 2024-11-20 RX ORDER — FUROSEMIDE 10 MG/ML
20 INJECTION INTRAMUSCULAR; INTRAVENOUS ONCE
Status: COMPLETED | OUTPATIENT
Start: 2024-11-20 | End: 2024-11-20

## 2024-11-20 RX ORDER — ENOXAPARIN SODIUM 100 MG/ML
40 INJECTION SUBCUTANEOUS DAILY
Status: DISCONTINUED | OUTPATIENT
Start: 2024-11-21 | End: 2024-11-24 | Stop reason: HOSPADM

## 2024-11-20 RX ORDER — HYDROCORTISONE SODIUM SUCCINATE 100 MG/2ML
200 INJECTION INTRAMUSCULAR; INTRAVENOUS ONCE
Status: COMPLETED | OUTPATIENT
Start: 2024-11-20 | End: 2024-11-20

## 2024-11-20 RX ORDER — GUAIFENESIN 100 MG/5ML
200 SOLUTION ORAL EVERY 4 HOURS PRN
Status: DISCONTINUED | OUTPATIENT
Start: 2024-11-20 | End: 2024-11-24 | Stop reason: HOSPADM

## 2024-11-20 RX ORDER — ATORVASTATIN CALCIUM 40 MG/1
40 TABLET, FILM COATED ORAL
Status: DISCONTINUED | OUTPATIENT
Start: 2024-11-20 | End: 2024-11-24 | Stop reason: HOSPADM

## 2024-11-20 RX ORDER — DIPHENHYDRAMINE HYDROCHLORIDE 50 MG/ML
50 INJECTION INTRAMUSCULAR; INTRAVENOUS ONCE
Status: COMPLETED | OUTPATIENT
Start: 2024-11-20 | End: 2024-11-20

## 2024-11-20 RX ADMIN — INFLUENZA A VIRUS A/VICTORIA/4897/2022 IVR-238 (H1N1) ANTIGEN (FORMALDEHYDE INACTIVATED), INFLUENZA A VIRUS A/CALIFORNIA/122/2022 SAN-022 (H3N2) ANTIGEN (FORMALDEHYDE INACTIVATED), AND INFLUENZA B VIRUS B/MICHIGAN/01/2021 ANTIGEN (FORMALDEHYDE INACTIVATED) 0.5 ML: 60; 60; 60 INJECTION, SUSPENSION INTRAMUSCULAR at 20:57

## 2024-11-20 RX ADMIN — IOHEXOL 80 ML: 350 INJECTION, SOLUTION INTRAVENOUS at 14:22

## 2024-11-20 RX ADMIN — AMOXICILLIN AND CLAVULANATE POTASSIUM 1 TABLET: 875; 125 TABLET, FILM COATED ORAL at 21:00

## 2024-11-20 RX ADMIN — ATORVASTATIN CALCIUM 40 MG: 40 TABLET, FILM COATED ORAL at 21:00

## 2024-11-20 RX ADMIN — INSULIN LISPRO 2 UNITS: 100 INJECTION, SOLUTION INTRAVENOUS; SUBCUTANEOUS at 22:17

## 2024-11-20 RX ADMIN — DIPHENHYDRAMINE HYDROCHLORIDE 50 MG: 50 INJECTION, SOLUTION INTRAMUSCULAR; INTRAVENOUS at 12:44

## 2024-11-20 RX ADMIN — FUROSEMIDE 20 MG: 10 INJECTION, SOLUTION INTRAMUSCULAR; INTRAVENOUS at 09:18

## 2024-11-20 RX ADMIN — HYDROCORTISONE SODIUM SUCCINATE 200 MG: 100 INJECTION, POWDER, FOR SOLUTION INTRAMUSCULAR; INTRAVENOUS at 09:47

## 2024-11-20 NOTE — ED PROVIDER NOTES
"Time reflects when diagnosis was documented in both MDM as applicable and the Disposition within this note       Time User Action Codes Description Comment    11/20/2024  3:32 PM Meryl Montes [R06.09] Dyspnea on minimal exertion     11/20/2024  3:33 PM Meryl Montes Add [J90] Pleural effusion, bilateral     11/20/2024  3:35 PM Meryl Montes Add [I50.9] CHF (congestive heart failure) ([facility])           ED Disposition       ED Disposition   Admit    Condition   Stable    Date/Time   Wed Nov 20, 2024  4:31 PM    Comment   Case was discussed with FM and the patient's admission status was agreed to be Admission Status: inpatient status to the service of Dr. Thompson .               Assessment & Plan       Medical Decision Making  Per chart review history of CHF with EF 35% in 2017, exacerbation suspected.  Elevated BNP noted, chest x-ray consistent with pulmonary congestion on wet read.  CTA PE study pending.  No PE.  Findings also consistent with heart failure.  Improving with diuresis, nasal cannula.  Did not necessitate BiPAP at this time.  Heart score 7, admitted.    All imaging and/or lab testing discussed with patient. Patient and/or family members verbalizes understanding and agrees with plan for admission. Patient is stable for admission.     Portions of the record may have been created with voice recognition software. Occasional wrong word or \"sound a like\" substitutions may have occurred due to the inherent limitations of voice recognition software. Read the chart carefully and recognize, using context, where substitutions have occurred.      Amount and/or Complexity of Data Reviewed  Labs: ordered. Decision-making details documented in ED Course.  Radiology: ordered.    Risk  Prescription drug management.  Decision regarding hospitalization.        ED Course as of 11/20/24 1713   Wed Nov 20, 2024   0915 BNP(!): 564   0937 Emergency CT contrast allergy protocol (per discussion with Radiology)     Hydrocortisone 200 " mg IV 4 hours before plus diphenhydramine 50 mg IV 1 hour    1002 Procedure Note: EKG  Date/Time: 11/20/24 10:02 AM   Performed by: Meryl Montes   Authorized by: Meryl Montes  ECG interpreted by me, the ED Provider: yes   The EKG demonstrates:  Rate 105 bpm  Rhythm Sinus tachycardia   QTc 491 ms   No ST elevation  LVH  Nonspecific st abnormality limited by artifact          Medications   furosemide (LASIX) injection 20 mg (20 mg Intravenous Given 11/20/24 0918)   hydrocortisone (Solu-CORTEF) injection 200 mg (200 mg Intravenous Given 11/20/24 0947)   diphenhydrAMINE (BENADRYL) injection 50 mg (50 mg Intravenous Given 11/20/24 1244)       ED Risk Strat Scores   HEART Risk Score      Flowsheet Row Most Recent Value   Heart Score Risk Calculator    History 1 Filed at: 11/20/2024 1708   ECG 1 Filed at: 11/20/2024 1708   Age 2 Filed at: 11/20/2024 1708   Risk Factors 2 Filed at: 11/20/2024 1708   Troponin 1 Filed at: 11/20/2024 1708   HEART Score 7 Filed at: 11/20/2024 1708                               SBIRT 22yo+      Flowsheet Row Most Recent Value   Initial Alcohol Screen: US AUDIT-C     1. How often do you have a drink containing alcohol? 0 Filed at: 11/20/2024 0827   2. How many drinks containing alcohol do you have on a typical day you are drinking?  0 Filed at: 11/20/2024 0827   3a. Male UNDER 65: How often do you have five or more drinks on one occasion? 0 Filed at: 11/20/2024 0827   3b. FEMALE Any Age, or MALE 65+: How often do you have 4 or more drinks on one occassion? 0 Filed at: 11/20/2024 0827   Audit-C Score 0 Filed at: 11/20/2024 0827   KALINA: How many times in the past year have you...    Used an illegal drug or used a prescription medication for non-medical reasons? Never Filed at: 11/20/2024 0827                            History of Present Illness       Chief Complaint   Patient presents with    Shortness of Breath     Since last night        Past Medical History:   Diagnosis Date    HLD  (hyperlipidemia)     Hypertension       History reviewed. No pertinent surgical history.   History reviewed. No pertinent family history.   Social History     Tobacco Use    Smoking status: Every Day     Types: Cigarettes     Start date: 2024     Passive exposure: Current    Smokeless tobacco: Former   Vaping Use    Vaping status: Some Days    Substances: Flavoring   Substance Use Topics    Alcohol use: Not Currently    Drug use: Not Currently     Comment: past use of Cocaine      E-Cigarette/Vaping    E-Cigarette Use Current Some Day User       E-Cigarette/Vaping Substances    Nicotine No     THC No     CBD No     Flavoring Yes     Other No     Unknown No       I have reviewed and agree with the history as documented.     69-year-old male past medical history of hypertension, hyperlipidemia, systolic heart failure presenting to emergency department complaining of acute onset of shortness of breath since last night.  Reports recently had pneumonia, took antibiotics, symptoms had resolved.  No fevers, no worsening cough.  Patient unsure about weight gain.  Reports usually walk a few steps before he gets short of breath.      History provided by:  Patient  Shortness of Breath  Associated symptoms: cough (weeks, improving)    Associated symptoms: no abdominal pain, no chest pain, no diaphoresis, no fever, no headaches, no hemoptysis, no neck pain, no rash, no sore throat, no syncope, no vomiting and no wheezing        Review of Systems   Constitutional:  Negative for diaphoresis and fever.   HENT:  Negative for sore throat.    Respiratory:  Positive for cough (weeks, improving) and shortness of breath. Negative for hemoptysis and wheezing.    Cardiovascular:  Positive for leg swelling. Negative for chest pain, palpitations and syncope.   Gastrointestinal:  Negative for abdominal pain, nausea and vomiting.   Musculoskeletal:  Negative for back pain and neck pain.   Skin:  Negative for rash.   Neurological:  Negative for  weakness and headaches.   All other systems reviewed and are negative.          Objective       ED Triage Vitals   Temperature Pulse Blood Pressure Respirations SpO2 Patient Position - Orthostatic VS   11/20/24 0828 11/20/24 0828 11/20/24 0828 11/20/24 0828 11/20/24 0828 11/20/24 1114   97.7 °F (36.5 °C) 101 152/94 (!) 40 94 % Lying      Temp src Heart Rate Source BP Location FiO2 (%) Pain Score    -- 11/20/24 1114 11/20/24 1114 -- --     Monitor Left arm        Vitals      Date and Time Temp Pulse SpO2 Resp BP Pain Score FACES Pain Rating User   11/20/24 1532 -- -- -- 21 -- -- -- YUN   11/20/24 1230 -- 90 98 % -- 134/84 -- -- JUAN   11/20/24 1114 -- 86 96 % 22 139/84 -- -- JUAN   11/20/24 0857 -- -- -- 28 -- -- -- AS   11/20/24 0828 97.7 °F (36.5 °C) 101 94 % 40 152/94 -- -- LB            Physical Exam  Vitals and nursing note reviewed.   Constitutional:       General: He is awake. He is not in acute distress.     Appearance: Normal appearance. He is not ill-appearing, toxic-appearing or diaphoretic.   HENT:      Head: Normocephalic and atraumatic.      Mouth/Throat:      Lips: Pink.      Mouth: Mucous membranes are moist.      Pharynx: Oropharynx is clear.   Eyes:      General: Vision grossly intact.   Cardiovascular:      Rate and Rhythm: Normal rate and regular rhythm.      Heart sounds: Normal heart sounds.   Pulmonary:      Effort: Pulmonary effort is normal. Tachypnea present. No accessory muscle usage, prolonged expiration, respiratory distress or retractions.      Breath sounds: No stridor or decreased air movement. Rales present. No decreased breath sounds or wheezing.   Abdominal:      General: There is no distension.      Palpations: Abdomen is soft.      Tenderness: There is no abdominal tenderness.   Musculoskeletal:      Right lower leg: Edema present.      Left lower leg: Edema present.   Skin:     General: Skin is warm and dry.      Capillary Refill: Capillary refill takes less than 2 seconds.    Neurological:      Mental Status: He is alert.         Results Reviewed       Procedure Component Value Units Date/Time    HS Troponin I 4hr [[id number]]  (Normal) Collected: 11/20/24 1244    Lab Status: Final result Specimen: Blood from Arm, Right Updated: 11/20/24 1318     hs TnI 4hr 12 ng/L      Delta 4hr hsTnI -1 ng/L     HS Troponin I 2hr [[id number]]  (Normal) Collected: 11/20/24 1014    Lab Status: Final result Specimen: Blood from Arm, Right Updated: 11/20/24 1050     hs TnI 2hr 12 ng/L      Delta 2hr hsTnI -1 ng/L     UA (URINE) with reflex to Scope [[id number]]  (Normal) Collected: 11/20/24 0948    Lab Status: Final result Specimen: Urine, Clean Catch Updated: 11/20/24 0958     Color, UA Straw     Clarity, UA Clear     Specific Gravity, UA 1.005     pH, UA 7.0     Leukocytes, UA Negative     Nitrite, UA Negative     Protein, UA Negative mg/dl      Glucose, UA Negative mg/dl      Ketones, UA Negative mg/dl      Bilirubin, UA Negative     Occult Blood, UA Negative     UROBILINOGEN UA Negative mg/dL     D-Dimer [[id number]]  (Abnormal) Collected: 11/20/24 0834    Lab Status: Final result Specimen: Blood from Arm, Right Updated: 11/20/24 0932     D-Dimer, Quant 0.96 ug/ml FEU     Narrative:      In the evaluation for possible pulmonary embolism, in the appropriate (Well's Score of 4 or less) patient, the age adjusted d-dimer cutoff for this patient can be calculated as:    Age x 0.01 (in ug/mL) for Age-adjusted D-dimer exclusion threshold for a patient over 50 years.    B-Type Natriuretic Peptide(BNP) [[id number]]  (Abnormal) Collected: 11/20/24 0834    Lab Status: Final result Specimen: Blood from Arm, Right Updated: 11/20/24 0911      pg/mL     Procalcitonin [[id number]]  (Normal) Collected: 11/20/24 0834    Lab Status: Final result Specimen: Blood from Arm, Right Updated: 11/20/24 0909     Procalcitonin <0.05 ng/ml     HS Troponin 0hr (reflex protocol) [[id number]]  (Normal) Collected: 11/20/24 0859     Lab Status: Final result Specimen: Blood from Arm, Right Updated: 11/20/24 0906     hs TnI 0hr 13 ng/L     FLU/COVID Rapid Antigen (30 min. TAT) - Preferred screening test in ED [[id number]]  (Normal) Collected: 11/20/24 0834    Lab Status: Final result Specimen: Nares from Nose Updated: 11/20/24 0900     SARS COV Rapid Antigen Negative     Influenza A Rapid Antigen Negative     Influenza B Rapid Antigen Negative    Narrative:      This test has been performed using the [employer] Brittney 2 FLU+SARS Antigen test under the Emergency Use Authorization (EUA). This test has been validated by the  and verified by the performing laboratory. The Brittney uses lateral flow immunofluorescent sandwich assay to detect SARS-COV, Influenza A and Influenza B Antigen.     The [employer] Brittney 2 SARS Antigen test does not differentiate between SARS-CoV and SARS-CoV-2.     Negative results are presumptive and may be confirmed with a molecular assay, if necessary, for patient management. Negative results do not rule out SARS-CoV-2 or influenza infection and should not be used as the sole basis for treatment or patient management decisions. A negative test result may occur if the level of antigen in a sample is below the limit of detection of this test.     Positive results are indicative of the presence of viral antigens, but do not rule out bacterial infection or co-infection with other viruses.     All test results should be used as an adjunct to clinical observations and other information available to the provider.    FOR PEDIATRIC PATIENTS - copy/paste COVID Guidelines URL to browser: [email or web address]    Comprehensive metabolic panel [[id number]]  (Abnormal) Collected: 11/20/24 0834    Lab Status: Final result Specimen: Blood from Arm, Right Updated: 11/20/24 0859     Sodium 139 mmol/L      Potassium 3.9 mmol/L      Chloride 102 mmol/L      CO2 29 mmol/L      ANION GAP 8  mmol/L      BUN 7 mg/dL      Creatinine 0.81 mg/dL      Glucose 168 mg/dL      Calcium 9.0 mg/dL      AST 19 U/L      ALT 14 U/L      Alkaline Phosphatase 92 U/L      Total Protein 7.5 g/dL      Albumin 4.0 g/dL      Total Bilirubin 1.06 mg/dL      eGFR 91 ml/min/1.73sq m     Narrative:      National Kidney Disease Foundation guidelines for Chronic Kidney Disease (CKD):     Stage 1 with normal or high GFR (GFR > 90 mL/min/1.73 square meters)    Stage 2 Mild CKD (GFR = 60-89 mL/min/1.73 square meters)    Stage 3A Moderate CKD (GFR = 45-59 mL/min/1.73 square meters)    Stage 3B Moderate CKD (GFR = 30-44 mL/min/1.73 square meters)    Stage 4 Severe CKD (GFR = 15-29 mL/min/1.73 square meters)    Stage 5 End Stage CKD (GFR <15 mL/min/1.73 square meters)  Note: GFR calculation is accurate only with a steady state creatinine    CBC and differential [[id number]] Collected: 11/20/24 0834    Lab Status: Final result Specimen: Blood from Arm, Right Updated: 11/20/24 0843     WBC 8.70 Thousand/uL      RBC 4.37 Million/uL      Hemoglobin 13.2 g/dL      Hematocrit 40.2 %      MCV 92 fL      MCH 30.2 pg      MCHC 32.8 g/dL      RDW 12.9 %      MPV 9.1 fL      Platelets 334 Thousands/uL      nRBC 0 /100 WBCs      Segmented % 70 %      Immature Grans % 1 %      Lymphocytes % 16 %      Monocytes % 11 %      Eosinophils Relative 2 %      Basophils Relative 0 %      Absolute Neutrophils 6.15 Thousands/µL      Absolute Immature Grans 0.04 Thousand/uL      Absolute Lymphocytes 1.39 Thousands/µL      Absolute Monocytes 0.92 Thousand/µL      Eosinophils Absolute 0.17 Thousand/µL      Basophils Absolute 0.03 Thousands/µL             CTA chest pe study   Final Interpretation by Estuardo Sandoval MD (11/20 1520)      1.  Constellation of findings suggesting volume overload with bilateral moderate pleural effusions, pulmonary vascular congestion, and interstitial and alveolar edema.      2.  No pulmonary emboli.            Workstation  performed: [id number]         XR chest 2 views   Final Interpretation by Estuardo Sandoval MD (11/20 1520)      1.  Constellation of findings suggesting volume overload with bilateral moderate pleural effusions, pulmonary vascular congestion, and interstitial and alveolar edema.      2.  No pulmonary emboli.            Workstation performed: [id number]             Procedures    ED Medication and Procedure Management   Prior to Admission Medications   Prescriptions Last Dose Informant Patient Reported? Taking?   amoxicillin-clavulanate (AUGMENTIN) 875-125 mg per tablet   No No   Sig: Take 1 tablet by mouth every 12 (twelve) hours for 7 days   aspirin (Aspirin Low Dose) 81 mg EC tablet 11/19/2024  No Yes   Sig: Take 1 tablet (81 mg total) by mouth daily   atorvastatin (LIPITOR) 40 mg tablet 11/19/2024  No Yes   Sig: Take 1 tablet (40 mg total) by mouth daily at bedtime   fexofenadine (ALLEGRA) 180 MG tablet Past Week  No Yes   Sig: Take 1 tablet (180 mg total) by mouth daily   fluticasone (FLONASE) 50 mcg/act nasal spray 11/19/2024  No Yes   Sig: Use 1 spray into each nostril daily.   lisinopril (ZESTRIL) 20 mg tablet 11/19/2024  No Yes   Sig: Take 1 tablet (20 mg total) by mouth daily   metFORMIN (GLUCOPHAGE) 500 mg tablet Past Week  No Yes   Sig: Take 1 tablet (500 mg total) by mouth 2 (two) times a day with meals   nicotine (NICODERM CQ) 21 mg/24 hr TD 24 hr patch   No No   Sig: Place 1 patch on the skin every 24 hours   nicotine polacrilex (NICORETTE) 2 mg gum   No No   Sig: Chew 1 each (2 mg total) as needed for smoking cessation      Facility-Administered Medications: None     Patient's Medications   Discharge Prescriptions    No medications on file     No discharge procedures on file.  ED SEPSIS DOCUMENTATION   Time reflects when diagnosis was documented in both MDM as applicable and the Disposition within this note       Time User Action Codes Description Comment    11/20/2024  3:32 PM Meryl Montes Add  [R06.09] Dyspnea on minimal exertion     11/20/2024  3:33 PM Meryl Montes [J90] Pleural effusion, bilateral     11/20/2024  3:35 PM Meryl Montes [I50.9] CHF (congestive heart failure) ([facility])                  Meryl Montes PA-C  11/20/24 1719

## 2024-11-20 NOTE — H&P
History and Physical - [facility]    Patient Information: Ruben Flores 69 y.o. male MRN: [id number]  Unit/Bed#: 7T [facility] 712-01 Encounter: [id number]  Admitting Physician: Guerline Weinberg MD  PCP: Yanira Dickey MD  Date of Admission:  11/20/24    Assessment and Plan    * Acute CHF (congestive heart failure) (HCC)  Assessment & Plan  Wt Readings from Last 3 Encounters:   11/20/24 73.2 kg (161 lb 6 oz)   11/13/24 71.3 kg (157 lb 3 oz)   04/26/24 70.3 kg (155 lb)     -secondary to poor outpatient follow-up and medication non-adherence; per patient had MI about 10 years ago and no cardiology follow-up   -last echo per [facility] chart review appears to be in 2017 with EF 35% however unable to view echo  -per medication review today, not on any goal directed medical therapy  -dry weight is around 70 kg  -s/p 20mg lasix in ED with improvement     PLAN  -echocardiogram  -diuresis with lasix as appropriate  -sodium 2g restriction  -I/O  -AM CBC, CMP    Type 2 diabetes mellitus without complication, without long-term current use of insulin ([facility])  Assessment & Plan  Lab Results   Component Value Date    HGBA1C 6.8 (A) 04/26/2024     -home medications: metformin 500mg BID  -has not used medication in two weeks because he ran out of medications    PLAN  -Hold metformin for now  -ISS, ACHS  -Diabetic diet  -AM CBC, CMP      HTN, goal below 140/90  Assessment & Plan  Home medication: lisinopril 20mg     PLAN  -Monitor BP  -continue home medication    Mixed hyperlipidemia  Assessment & Plan  -home medication: lipitor 40mg     PLAN  -continue home medication    Tobacco user  Assessment & Plan  -5-6 cigarettes daily for about 38 years; quit 15 days ago    PLAN  -decline NRT  -Nicorette gum per request   - on smoking cessation        VTE Prophylaxis: VTE Score: 8 High Risk (Score >/= 5) - Pharmacological DVT Prophylaxis Ordered: enoxaparin (Lovenox). Sequential Compression Devices Ordered.  Code  Status: Level 1 - Full Code  Anticipated Length of Stay:  Patient will be admitted on an Inpatient basis with an anticipated length of stay of  less than 2 midnights.     Justification for Hospital Stay: Acute exacerbation of CHF  Total Time for Visit, including Counseling / Coordination of Care: 45 mins. Greater than 50% of this total time spent on direct patient counseling and coordination of care.    Chief Complaint:     Chief Complaint   Patient presents with    Shortness of Breath     Since last night      History of Present Illness:    Ruben Flores is a 69 y.o. male with PMH of HTN, DM & HFrEF who presents with shortness of breath since last night whereby he was awoken from his sleep because he felt like he couldnt breathe. He denies any associated chest pain, fever or swelling of his lower limbs. He does report coughing about 4 days ago but it resolved. For his underlying DM & HTN, he reports not taking his metformin for the past 15 days and his lisinopril for the past week, as he ran out of his medications. He denies any substance use.     Last week he came to the ED complaining of chest pain and cough and was diagnosed with pneumonia. He was discharged with Augmentin (a week course) and  azithromycin (4 day course).    ED COURSE  Vitals /94, , RR 40  T 97.7 SPO2 94%  Labs CMP glucose 168, Trop 0hr 13, , D-Dimer 0.96, otherwise CBC, UA, Procal COVID/FLU negative are WNL  Imaging  CXR & CTA chest : Constellation of findings suggesting volume overload with bilateral moderate pleural effusions, pulmonary vascular congestion, and interstitial and alveolar edema. No pulmonary emboli.   Meds/Interventions :   furosemide (LASIX) injection 20 mg  hydrocortisone (Solu-CORTEF) injection 200 mg  diphenhydrAMINE (BENADRYL) injection 50 mg    To admit to FM service due to acute exacerbation of CHF    Review of Systems:  Review of Systems   Constitutional:  Negative for chills and fever.   HENT:   Negative for ear pain and sore throat.    Eyes:  Negative for pain and visual disturbance.   Respiratory:  Positive for shortness of breath. Negative for cough.    Cardiovascular:  Negative for chest pain and palpitations.   Gastrointestinal:  Negative for abdominal pain and vomiting.   Musculoskeletal:  Negative for arthralgias and back pain.   Skin:  Negative for color change and rash.   Allergic/Immunologic: Positive for environmental allergies (pollen).   All other systems reviewed and are negative.      Past Medical and Surgical History:   Past Medical History:   Diagnosis Date    HLD (hyperlipidemia)     Hypertension      History reviewed. No pertinent surgical history.  Meds/Allergies:  Allergies:   Allergies   Allergen Reactions    Pollen Extract Itching    Ioversol Rash     Prior to Admission Medications   Prescriptions Last Dose Informant Patient Reported? Taking?   amoxicillin-clavulanate (AUGMENTIN) 875-125 mg per tablet 11/20/2024 Morning  No Yes   Sig: Take 1 tablet by mouth every 12 (twelve) hours for 7 days   aspirin (Aspirin Low Dose) 81 mg EC tablet 11/19/2024 Morning  No Yes   Sig: Take 1 tablet (81 mg total) by mouth daily   atorvastatin (LIPITOR) 40 mg tablet Past Month  No Yes   Sig: Take 1 tablet (40 mg total) by mouth daily at bedtime   fexofenadine (ALLEGRA) 180 MG tablet Not Taking  No No   Sig: Take 1 tablet (180 mg total) by mouth daily   Patient not taking: Reported on 11/20/2024   fluticasone (FLONASE) 50 mcg/act nasal spray More than a month  No No   Sig: Use 1 spray into each nostril daily.   lisinopril (ZESTRIL) 20 mg tablet Past Month  No Yes   Sig: Take 1 tablet (20 mg total) by mouth daily   metFORMIN (GLUCOPHAGE) 500 mg tablet Past Week  No Yes   Sig: Take 1 tablet (500 mg total) by mouth 2 (two) times a day with meals   nicotine (NICODERM CQ) 21 mg/24 hr TD 24 hr patch   No No   Sig: Place 1 patch on the skin every 24 hours   nicotine polacrilex (NICORETTE) 2 mg gum   No No    Sig: Chew 1 each (2 mg total) as needed for smoking cessation      Facility-Administered Medications: None     Social History:     Social History     Socioeconomic History    Marital status: /Civil Union     Spouse name: Not on file    Number of children: Not on file    Years of education: Not on file    Highest education level: Not on file   Occupational History    Not on file   Tobacco Use    Smoking status: Every Day     Types: Cigarettes     Start date: 2024     Passive exposure: Current    Smokeless tobacco: Former   Vaping Use    Vaping status: Some Days    Substances: Flavoring   Substance and Sexual Activity    Alcohol use: Not Currently    Drug use: Not Currently     Comment: past use of Cocaine    Sexual activity: Not on file   Other Topics Concern    Not on file   Social History Narrative    Not on file     Social Drivers of Health     Financial Resource Strain: Medium Risk (4/26/2024)    Overall Financial Resource Strain (CARDIA)     Difficulty of Paying Living Expenses: Somewhat hard   Food Insecurity: No Food Insecurity (11/20/2024)    Nursing - Inadequate Food Risk Classification     Worried About Running Out of Food in the Last Year: Never true     Ran Out of Food in the Last Year: Never true     Ran Out of Food in the Last Year: 1   Transportation Needs: No Transportation Needs (11/20/2024)    Nursing - Transportation Risk Classification     Lack of Transportation: Not on file     Lack of Transportation: 2   Physical Activity: Not on file   Stress: Not on file   Social Connections: Not on file   Intimate Partner Violence: Unknown (11/20/2024)    Nursing IPS     Feels Physically and Emotionally Safe: Not on file     Physically Hurt by Someone: Not on file     Humiliated or Emotionally Abused by Someone: Not on file     Physically Hurt by Someone: 2     Hurt or Threatened by Someone: 2   Housing Stability: Unknown (11/20/2024)    Nursing: Inadequate Housing Risk Classification     Has  "Housing: Not on file     Worried About Losing Housing: Not on file     Unable to Get Utilities: Not on file     Unable to Pay for Housing in the Last Year: 2     Has Housin     Patient Pre-hospital Living Situation: home  Patient Pre-hospital Level of Mobility: normal  Patient Pre-hospital Diet Restrictions: diabetic    Family History:  History reviewed. No pertinent family history.    Physical Exam:   Vitals:   Blood Pressure: (!) 162/109 (24)  Pulse: 99 (24)  Temperature: (!) 97.2 °F (36.2 °C) (24)  Temp Source: Temporal (24)  Respirations: 19 (24)  Height: 5' 2\" (157.5 cm) (24)  Weight - Scale: 69.8 kg (153 lb 14.1 oz) (24)  SpO2: 97 % (24)    Physical Exam  Vitals and nursing note reviewed.   Constitutional:       Appearance: Normal appearance.   HENT:      Head: Normocephalic.      Nose: Nose normal.      Mouth/Throat:      Mouth: Mucous membranes are moist.   Eyes:      Extraocular Movements: Extraocular movements intact.   Cardiovascular:      Rate and Rhythm: Normal rate and regular rhythm.      Pulses: Normal pulses.      Heart sounds: Normal heart sounds.   Pulmonary:      Breath sounds: Normal breath sounds. No wheezing or rhonchi.   Chest:      Chest wall: No tenderness.   Abdominal:      General: Bowel sounds are normal.      Palpations: Abdomen is soft.   Musculoskeletal:      Cervical back: Normal range of motion.   Skin:     General: Skin is warm.      Capillary Refill: Capillary refill takes less than 2 seconds.   Neurological:      Mental Status: He is alert and oriented to person, place, and time.   Psychiatric:         Mood and Affect: Mood normal.       Lab Results:   Results from last 7 days   Lab Units 24  0834   WBC Thousand/uL 8.70   HEMOGLOBIN g/dL 13.2   HEMATOCRIT % 40.2   PLATELETS Thousands/uL 334   SEGS PCT % 70   LYMPHO PCT % 16   MONO PCT % 11   EOS PCT % 2     Results from last 7 days "   Lab Units 11/20/24  0834   POTASSIUM mmol/L 3.9   CHLORIDE mmol/L 102   CO2 mmol/L 29   BUN mg/dL 7   CREATININE mg/dL 0.81   CALCIUM mg/dL 9.0   ALK PHOS U/L 92   ALT U/L 14   AST U/L 19   EGFR ml/min/1.73sq m 91                 Results from last 7 days   Lab Units 11/20/24  0834   D-DIMER QUANTITATIVE ug/ml FEU 0.96*          Results from last 7 days   Lab Units 11/20/24  0948   COLOR UA  Straw   CLARITY UA  Clear   SPEC GRAV UA  1.005   PH UA  7.0   LEUKOCYTES UA  Negative   NITRITE UA  Negative   GLUCOSE UA mg/dl Negative   KETONES UA mg/dl Negative   BILIRUBIN UA  Negative   BLOOD UA  Negative             Imaging:   XR chest 2 views  Result Date: 11/20/2024  Narrative: AP CHEST RADIOGRAPH CTA - CHEST WITH IV CONTRAST - PULMONARY ANGIOGRAM INDICATION: cp,sob, elevated d-dimer. COMPARISON: Chest radiograph dated 11/20/2024 TECHNIQUE: Initially, semiupright AP chest radiograph was performed. Subsequently, CTA examination of the chest was performed using angiographic technique according to a protocol specifically tailored to evaluate for pulmonary embolism. Multiplanar 2D reformatted images were created from the source data. In addition, coronal 3D MIP postprocessing was performed on the acquisition scanner. Radiation dose length product (DLP) for this visit: CT--406 mGy-cm  (accession [id number]), XR--0 mGy  (accession [id number]). This examination, like all CT scans performed in the [facility], was performed utilizing techniques to minimize radiation dose exposure, including the use of iterative reconstruction and automated exposure control. IV Contrast: 80 mL of iohexol (OMNIPAQUE) FINDINGS: --CHEST RADIOGRAPH-- Mild prominence of the cardiomediastinal silhouette even when accounting for portable technique. Pulmonary vasculature is engorged and indistinct. Generalized increase in peripheral reticular lung markings (Kerley B lines). Patchy central-predominant opacities in both lungs. No  "radiographic effusions evident, though present on subsequent CT. No pneumothorax. Bones are unremarkable. --PULMONARY CTA-- PULMONARY ARTERIAL TREE: Central pulmonary arteries are normal in caliber. No pulmonary emboli demonstrated. \"Supine cephalization\" with disproportionate prominence of the anterior pulmonary vasculature. LUNGS: Reticular interlobular septal thickening and central-predominant symmetric groundglass opacities in keeping with interstitial and alveolar edema, respectively. No focal pneumonic consolidation. Mild diffuse airway thickening also attributable to third spacing. No concerning endobronchial lesions. No suspicious pulmonary nodules or masses. PLEURA: Moderate bilateral layering pleural effusions without loculated components. HEART/GREAT VESSELS: Mild four-chamber enlargement of the heart. No pericardial effusion. No thoracic aortic aneurysm. MEDIASTINUM AND ESTELA: Unremarkable. CHEST WALL AND LOWER NECK: Unremarkable. VISUALIZED STRUCTURES IN THE UPPER ABDOMEN: Question gallstone at the proximal body on lower-most image without visualized findings concerning for cholecystitis. No acute findings in the imaged portion of the upper abdomen. OSSEOUS STRUCTURES: Old, healed posterior left rib fractures. No acute fractures or destructive osseous lesions.     Impression: 1.  Constellation of findings suggesting volume overload with bilateral moderate pleural effusions, pulmonary vascular congestion, and interstitial and alveolar edema. 2.  No pulmonary emboli. Workstation performed: [id number]     CTA chest pe study  Result Date: 11/20/2024  Narrative: AP CHEST RADIOGRAPH CTA - CHEST WITH IV CONTRAST - PULMONARY ANGIOGRAM INDICATION: cp,sob, elevated d-dimer. COMPARISON: Chest radiograph dated 11/20/2024 TECHNIQUE: Initially, semiupright AP chest radiograph was performed. Subsequently, CTA examination of the chest was performed using angiographic technique according to a protocol specifically " "tailored to evaluate for pulmonary embolism. Multiplanar 2D reformatted images were created from the source data. In addition, coronal 3D MIP postprocessing was performed on the acquisition scanner. Radiation dose length product (DLP) for this visit: CT--406 mGy-cm  (accession [id number]), XR--0 mGy  (accession [id number]). This examination, like all CT scans performed in the [facility], was performed utilizing techniques to minimize radiation dose exposure, including the use of iterative reconstruction and automated exposure control. IV Contrast: 80 mL of iohexol (OMNIPAQUE) FINDINGS: --CHEST RADIOGRAPH-- Mild prominence of the cardiomediastinal silhouette even when accounting for portable technique. Pulmonary vasculature is engorged and indistinct. Generalized increase in peripheral reticular lung markings (Kerley B lines). Patchy central-predominant opacities in both lungs. No radiographic effusions evident, though present on subsequent CT. No pneumothorax. Bones are unremarkable. --PULMONARY CTA-- PULMONARY ARTERIAL TREE: Central pulmonary arteries are normal in caliber. No pulmonary emboli demonstrated. \"Supine cephalization\" with disproportionate prominence of the anterior pulmonary vasculature. LUNGS: Reticular interlobular septal thickening and central-predominant symmetric groundglass opacities in keeping with interstitial and alveolar edema, respectively. No focal pneumonic consolidation. Mild diffuse airway thickening also attributable to third spacing. No concerning endobronchial lesions. No suspicious pulmonary nodules or masses. PLEURA: Moderate bilateral layering pleural effusions without loculated components. HEART/GREAT VESSELS: Mild four-chamber enlargement of the heart. No pericardial effusion. No thoracic aortic aneurysm. MEDIASTINUM AND ESTELA: Unremarkable. CHEST WALL AND LOWER NECK: Unremarkable. VISUALIZED STRUCTURES IN THE UPPER ABDOMEN: Question gallstone at the proximal body on " lower-most image without visualized findings concerning for cholecystitis. No acute findings in the imaged portion of the upper abdomen. OSSEOUS STRUCTURES: Old, healed posterior left rib fractures. No acute fractures or destructive osseous lesions.     Impression: 1.  Constellation of findings suggesting volume overload with bilateral moderate pleural effusions, pulmonary vascular congestion, and interstitial and alveolar edema. 2.  No pulmonary emboli. Workstation performed: [id number]     XR chest 1 view portable  Result Date: 11/13/2024  Narrative: XR CHEST PORTABLE INDICATION: cough. COMPARISON: Chest radiograph December 2, 2019 FINDINGS: New bilateral perihilar and peripheral reticular opacities with more focal opacity in the right lower lung. No pneumothorax or pleural effusion. Normal cardiomediastinal silhouette. Chronic right rib fractures. No acute osseous abnormality. Normal upper abdomen.     Impression: Pulmonary edema and/or pneumonia. Patient's smoking history is noted, and follow-up noncontrast chest is advised in 3 months. The study was marked in EPIC for immediate notification. Workstation performed: [id number]       EKG, Pathology, and Other Studies Reviewed on Admission:   EKG  Result Date: 11/20/24  Impression:  Sinus tachycardia, Possible Left atrial enlargement  Left ventricular hypertrophy with repolarization abnormality. Abnormal ECG. When compared with ECG of 13-Nov-2024 07:47,    Entire H&P was discussed with Dr. Thompson who agreed to what is noted above    Guerline Weinberg MD  11/20/24  8:14 PM

## 2024-11-20 NOTE — ASSESSMENT & PLAN NOTE
-5-6 cigarettes daily for about 38 years; quit 15 days ago    PLAN  -declined NRT  -Nicorette gum per request   - on smoking cessation

## 2024-11-20 NOTE — PLAN OF CARE
Problem: PAIN - ADULT  Goal: Verbalizes/displays adequate comfort level or baseline comfort level  Description: Interventions:  - Encourage patient to monitor pain and request assistance  - Assess pain using appropriate pain scale  - Administer analgesics based on type and severity of pain and evaluate response  - Implement non-pharmacological measures as appropriate and evaluate response  - Consider cultural and social influences on pain and pain management  - Notify physician/advanced practitioner if interventions unsuccessful or patient reports new pain  Outcome: Progressing     Problem: INFECTION - ADULT  Goal: Absence or prevention of progression during hospitalization  Description: INTERVENTIONS:  - Assess and monitor for signs and symptoms of infection  - Monitor lab/diagnostic results  - Monitor all insertion sites, i.e. indwelling lines, tubes, and drains  - Monitor endotracheal if appropriate and nasal secretions for changes in amount and color  - [place] appropriate cooling/warming therapies per order  - Administer medications as ordered  - Instruct and encourage patient and family to use good hand hygiene technique  - Identify and instruct in appropriate isolation precautions for identified infection/condition  Outcome: Progressing     Problem: RESPIRATORY - ADULT  Goal: Achieves optimal ventilation and oxygenation  Description: INTERVENTIONS:  - Assess for changes in respiratory status  - Assess for changes in mentation and behavior  - Position to facilitate oxygenation and minimize respiratory effort  - Oxygen administered by appropriate delivery if ordered  - Initiate smoking cessation education as indicated  - Encourage broncho-pulmonary hygiene including cough, deep breathe, Incentive Spirometry  - Assess the need for suctioning and aspirate as needed  - Assess and instruct to report SOB or any respiratory difficulty  - Respiratory Therapy support as indicated  Outcome: Progressing     Problem:  Knowledge Deficit  Goal: Patient/family/caregiver demonstrates understanding of disease process, treatment plan, medications, and discharge instructions  Description: Complete learning assessment and assess knowledge base.  Interventions:  - Provide teaching at level of understanding  - Provide teaching via preferred learning methods  Outcome: Progressing     Problem: DISCHARGE PLANNING  Goal: Discharge to home or other facility with appropriate resources  Description: INTERVENTIONS:  - Identify barriers to discharge w/patient and caregiver  - Arrange for needed discharge resources and transportation as appropriate  - Identify discharge learning needs (meds, wound care, etc.)  - Arrange for interpretive services to assist at discharge as needed  - Refer to Case Management Department for coordinating discharge planning if the patient needs post-hospital services based on physician/advanced practitioner order or complex needs related to functional status, cognitive ability, or social support system  Outcome: Progressing

## 2024-11-20 NOTE — ASSESSMENT & PLAN NOTE
Lab Results   Component Value Date    HGBA1C 6.9 (H) 11/20/2024     -home medications: metformin 500mg BID  -has not used medication in two weeks because he ran out of medications  -Last glucose (CMP) 107    PLAN  -Hold metformin for now  -ISS, ACHS  -Diabetic diet  -AM CBC, CMP  (P) [id number]

## 2024-11-20 NOTE — ASSESSMENT & PLAN NOTE
Wt Readings from Last 3 Encounters:   11/21/24 69.4 kg (153 lb)   11/13/24 71.3 kg (157 lb 3 oz)   04/26/24 70.3 kg (155 lb)     -secondary to poor outpatient follow-up and medication non-adherence; per patient had MI about 10 years ago and no cardiology follow-up   -last echo per [facility] chart review appears to be in 2017 with EF 35% however unable to view echo  -per medication review today, not on any goal directed medical therapy  -suspected GORGE  -dry weight is around 70 kg  -s/p 20mg lasix in ED with improvement     PLAN  -Echocardiogram   -Cont lasix 20 mg QD  -Wean off O2 today, as tolerated  -Sodium 2g restriction  -Fluid restriction 1800ml   -I/O  -Daily weights   -AM CBC, CMP  -Follow up with a sleep study as outpatient  -Follow up with PCP as outpatient

## 2024-11-21 ENCOUNTER — APPOINTMENT (INPATIENT)
Dept: NON INVASIVE DIAGNOSTICS | Facility: HOSPITAL | Age: 69
DRG: 291 | End: 2024-11-21
Payer: COMMERCIAL

## 2024-11-21 LAB
ALBUMIN SERPL BCG-MCNC: 3.4 G/DL (ref 3.5–5)
ALP SERPL-CCNC: 76 U/L (ref 34–104)
ALT SERPL W P-5'-P-CCNC: 10 U/L (ref 7–52)
ANION GAP SERPL CALCULATED.3IONS-SCNC: 8 MMOL/L (ref 4–13)
AST SERPL W P-5'-P-CCNC: 13 U/L (ref 13–39)
BASOPHILS # BLD AUTO: 0.03 THOUSANDS/ÂΜL (ref 0–0.1)
BASOPHILS NFR BLD AUTO: 0 % (ref 0–1)
BILIRUB SERPL-MCNC: 0.8 MG/DL (ref 0.2–1)
BUN SERPL-MCNC: 11 MG/DL (ref 5–25)
CALCIUM ALBUM COR SERPL-MCNC: 9.2 MG/DL (ref 8.3–10.1)
CALCIUM SERPL-MCNC: 8.7 MG/DL (ref 8.4–10.2)
CHLORIDE SERPL-SCNC: 103 MMOL/L (ref 96–108)
CO2 SERPL-SCNC: 29 MMOL/L (ref 21–32)
CREAT SERPL-MCNC: 0.75 MG/DL (ref 0.6–1.3)
EOSINOPHIL # BLD AUTO: 0.05 THOUSAND/ÂΜL (ref 0–0.61)
EOSINOPHIL NFR BLD AUTO: 1 % (ref 0–6)
ERYTHROCYTE [DISTWIDTH] IN BLOOD BY AUTOMATED COUNT: 12.7 % (ref 11.6–15.1)
GFR SERPL CREATININE-BSD FRML MDRD: 94 ML/MIN/1.73SQ M
GLUCOSE SERPL-MCNC: 107 MG/DL (ref 65–140)
GLUCOSE SERPL-MCNC: 107 MG/DL (ref 65–140)
GLUCOSE SERPL-MCNC: 155 MG/DL (ref 65–140)
GLUCOSE SERPL-MCNC: 159 MG/DL (ref 65–140)
GLUCOSE SERPL-MCNC: 170 MG/DL (ref 65–140)
HCT VFR BLD AUTO: 37.7 % (ref 36.5–49.3)
HGB BLD-MCNC: 12.4 G/DL (ref 12–17)
IMM GRANULOCYTES # BLD AUTO: 0.03 THOUSAND/UL (ref 0–0.2)
IMM GRANULOCYTES NFR BLD AUTO: 0 % (ref 0–2)
LYMPHOCYTES # BLD AUTO: 1.79 THOUSANDS/ÂΜL (ref 0.6–4.47)
LYMPHOCYTES NFR BLD AUTO: 20 % (ref 14–44)
MAGNESIUM SERPL-MCNC: 1.8 MG/DL (ref 1.9–2.7)
MCH RBC QN AUTO: 29.7 PG (ref 26.8–34.3)
MCHC RBC AUTO-ENTMCNC: 32.9 G/DL (ref 31.4–37.4)
MCV RBC AUTO: 90 FL (ref 82–98)
MONOCYTES # BLD AUTO: 1.1 THOUSAND/ÂΜL (ref 0.17–1.22)
MONOCYTES NFR BLD AUTO: 12 % (ref 4–12)
NEUTROPHILS # BLD AUTO: 5.84 THOUSANDS/ÂΜL (ref 1.85–7.62)
NEUTS SEG NFR BLD AUTO: 67 % (ref 43–75)
NRBC BLD AUTO-RTO: 0 /100 WBCS
PLATELET # BLD AUTO: 322 THOUSANDS/UL (ref 149–390)
PMV BLD AUTO: 9.6 FL (ref 8.9–12.7)
POTASSIUM SERPL-SCNC: 3.5 MMOL/L (ref 3.5–5.3)
PROT SERPL-MCNC: 6.7 G/DL (ref 6.4–8.4)
RBC # BLD AUTO: 4.18 MILLION/UL (ref 3.88–5.62)
SODIUM SERPL-SCNC: 140 MMOL/L (ref 135–147)
WBC # BLD AUTO: 8.84 THOUSAND/UL (ref 4.31–10.16)

## 2024-11-21 PROCEDURE — C8929 TTE W OR WO FOL WCON,DOPPLER: HCPCS

## 2024-11-21 PROCEDURE — 93306 TTE W/DOPPLER COMPLETE: CPT | Performed by: INTERNAL MEDICINE

## 2024-11-21 PROCEDURE — 82948 REAGENT STRIP/BLOOD GLUCOSE: CPT

## 2024-11-21 PROCEDURE — 83735 ASSAY OF MAGNESIUM: CPT

## 2024-11-21 PROCEDURE — 85025 COMPLETE CBC W/AUTO DIFF WBC: CPT

## 2024-11-21 PROCEDURE — 80053 COMPREHEN METABOLIC PANEL: CPT

## 2024-11-21 PROCEDURE — 99232 SBSQ HOSP IP/OBS MODERATE 35: CPT | Performed by: FAMILY MEDICINE

## 2024-11-21 RX ORDER — FUROSEMIDE 20 MG/1
20 TABLET ORAL DAILY
Status: DISCONTINUED | OUTPATIENT
Start: 2024-11-21 | End: 2024-11-24 | Stop reason: HOSPADM

## 2024-11-21 RX ORDER — MAGNESIUM SULFATE HEPTAHYDRATE 40 MG/ML
2 INJECTION, SOLUTION INTRAVENOUS ONCE
Status: COMPLETED | OUTPATIENT
Start: 2024-11-21 | End: 2024-11-21

## 2024-11-21 RX ORDER — LANOLIN ALCOHOL/MO/W.PET/CERES
400 CREAM (GRAM) TOPICAL 2 TIMES DAILY
Status: DISCONTINUED | OUTPATIENT
Start: 2024-11-21 | End: 2024-11-21

## 2024-11-21 RX ADMIN — ENOXAPARIN SODIUM 40 MG: 40 INJECTION SUBCUTANEOUS at 08:47

## 2024-11-21 RX ADMIN — ASPIRIN 81 MG: 81 TABLET, COATED ORAL at 08:47

## 2024-11-21 RX ADMIN — INSULIN LISPRO 1 UNITS: 100 INJECTION, SOLUTION INTRAVENOUS; SUBCUTANEOUS at 21:04

## 2024-11-21 RX ADMIN — PERFLUTREN 0.8 ML/MIN: 6.52 INJECTION, SUSPENSION INTRAVENOUS at 12:14

## 2024-11-21 RX ADMIN — INSULIN LISPRO 1 UNITS: 100 INJECTION, SOLUTION INTRAVENOUS; SUBCUTANEOUS at 12:04

## 2024-11-21 RX ADMIN — ATORVASTATIN CALCIUM 40 MG: 40 TABLET, FILM COATED ORAL at 21:03

## 2024-11-21 RX ADMIN — LISINOPRIL 20 MG: 20 TABLET ORAL at 08:47

## 2024-11-21 RX ADMIN — INSULIN LISPRO 1 UNITS: 100 INJECTION, SOLUTION INTRAVENOUS; SUBCUTANEOUS at 17:37

## 2024-11-21 RX ADMIN — MAGNESIUM SULFATE HEPTAHYDRATE 2 G: 40 INJECTION, SOLUTION INTRAVENOUS at 08:47

## 2024-11-21 RX ADMIN — FUROSEMIDE 20 MG: 20 TABLET ORAL at 10:19

## 2024-11-21 NOTE — PROGRESS NOTES
Spoke with patients shira this evening. She is requesting cardiology to see patient while he is admitted because she is concerned for his cardiac health. She states he was supposed to see his PCP yesterday but PCP cancelled his appt and rescheduled for January.    MD aware, will consult if necessary and discuss with day team

## 2024-11-21 NOTE — PROGRESS NOTES
Attempted to wean off O2. Found to be at 80% on RA. He ranged from 87-99% on 1L.   Remains on 2L HS. Looks to possibly have GORGE.

## 2024-11-21 NOTE — UTILIZATION REVIEW
NOTIFICATION OF INPATIENT MEDICAL ADMISSION   AUTHORIZATION REQUEST   SERVICING FACILITY:   [address] [address]  Tax ID: [id number]  NPI: [id number] ATTENDING PROVIDER:  Attending Name and NPI#: Lindy Thompson Md [[id number]]  Address: [address] [place] [address]  Phone: [phone]     ADMISSION INFORMATION:  Place of Service: Inpatient [facility]  Place of Service Code: 21  Inpatient Admission Date/Time: 11/20/24  4:32 PM  Discharge Date/Time: No discharge date for patient encounter.  Admitting Diagnosis Code/Description:  Shortness of breath [R06.02]  CHF (congestive heart failure) (HCC) [I50.9]  Pleural effusion, bilateral [J90]  Dyspnea on minimal exertion [R06.09]     UTILIZATION REVIEW CONTACT:  Shelia Smith, Utilization   Network Utilization Review Department  Phone: [phone]  Fax [phone]  Email: [email or web address]  Contact for approvals/pending authorizations, clinical reviews, and discharge.     PHYSICIAN ADVISORY SERVICES:  Medical Necessity Denial & Uxdj-[id number]-Wfgi Review  Phone: [phone]  Fax: [phone]  Email: [email or web address]     DISCHARGE SUPPORT TEAM:  For Patients Discharge Needs & Updates  Phone: [phone] opt. 2 Fax: [phone]  Email: [email or web address]

## 2024-11-21 NOTE — CASE MANAGEMENT
Case Management Assessment & Discharge Planning Note    Patient name Ruben Flores  Location 7T [facility] 712/7T [facility] 712-01 MRN [id number]  : 1955 Date 2024       Current Admission Date: 2024  Current Admission Diagnosis:Acute CHF (congestive heart failure) (HCC)   Patient Active Problem List    Diagnosis Date Noted Date Diagnosed    Acute CHF (congestive heart failure) (HCC) 2024     Screening for AAA (aortic abdominal aneurysm) 2024     Seasonal allergies 2021     Screening for AAA (abdominal aortic aneurysm) 2020     Callus of foot 2020     Type 2 diabetes mellitus without complication, without long-term current use of insulin (HCC) 2019     HTN, goal below 140/90 2018     Mixed hyperlipidemia 2018     Systolic heart failure (HCC) 2018     Tobacco user 2018       LOS (days): 1  Geometric Mean LOS (GMLOS) (days): 3.9  Days to GMLOS:3     OBJECTIVE:    Risk of Unplanned Readmission Score: 9.76      Current admission status: Inpatient    [facility] [place] [id number]  Phone: [phone] Fax: [phone]    Primary Care Provider: Yanira Dickey MD    Primary Insurance: [employer] [facility]  Secondary Insurance:     ASSESSMENT:  Active Health Care Proxies    There are no active Health Care Proxies on file.       Readmission Root Cause  30 Day Readmission: No    Patient Information  Admitted from:: Home  Mental Status: Alert  During Assessment patient was accompanied by: Not accompanied during assessment  Assessment information provided by:: Patient  Primary Caregiver: Self  Support Systems: Friend, Family members, Self  County of Residence: [place]  What [facility] do you live in?: [place]  Home entry access options. Select all that apply.: Stairs  Type of Current Residence: Apartment  Floor Level: 2  Upon  entering residence, is there a bedroom on the main floor (no further steps)?: No  A bedroom is located on the following floor levels of residence (select all that apply):: 2nd Floor  Upon entering residence, is there a bathroom on the main floor (no further steps)?: Yes  Number of steps to 2nd floor from main floor: One Flight  Living Arrangements: Lives w/ Friend  Is patient a ?: No    Activities of Daily Living Prior to Admission  Functional Status: Independent  Completes ADLs independently?: Yes  Ambulates independently?: Yes  Does patient use assisted devices?: No  Does patient currently own DME?: No  Does patient have a history of Outpatient Therapy (PT/OT)?: No  Does the patient have a history of Short-Term Rehab?: No  Does patient have a history of HHC?: No  Does patient currently have HHC?: No    Patient Information Continued  Income Source: Unknown  Does patient have prescription coverage?: Yes  Does patient receive dialysis treatments?: No  Does patient have a history of substance abuse?: No  Does patient have a history of Mental Health Diagnosis?: No    Means of Transportation  Means of Transport to Appts:: Drives Self    DISCHARGE DETAILS:    Discharge planning discussed with:: Patient  Freedom of Choice: Yes     CM contacted family/caregiver?: No- see comments (AAOx3 declines)  Were Treatment Team discharge recommendations reviewed with patient/caregiver?: Yes  Did patient/caregiver verbalize understanding of patient care needs?: Yes  Were patient/caregiver advised of the risks associated with not following Treatment Team discharge recommendations?: Yes    Would you like to participate in our [facility] service program?  : Yes     Additional Comments: Met with patient at bedside used ipad [employer] #[id number].  Patient is Icelandic speaking only.  Patient IPTA and drives self and has car at hospital.  Uses [facility].  Home number in EPIC incorrect.  Updated home phone  number.  CM following patient thru discharge

## 2024-11-21 NOTE — UTILIZATION REVIEW
"Initial Clinical Review    Admission: Date/Time/Statement:   Admission Orders (From admission, onward)       Ordered        11/20/24 1632  INPATIENT ADMISSION  Once                          Orders Placed This Encounter   Procedures    INPATIENT ADMISSION     Standing Status:   Standing     Number of Occurrences:   1     Level of Care:   Med Surg [16]     Estimated length of stay:   More than 2 Midnights     Certification:   I certify that inpatient services are medically necessary for this patient for a duration of greater than two midnights. See H&P and MD Progress Notes for additional information about the patient's course of treatment.     ED Arrival Information       Expected   -    Arrival   11/20/2024 08:17    Acuity   Urgent              Means of arrival   Walk-In    Escorted by   Self    Service   Family Medicine    Admission type   Emergency              Arrival complaint   chest pain             Chief Complaint   Patient presents with    Shortness of Breath     Since last night        Initial Presentation: 69 y.o. male  to ER from home w/reported increasing SOB thru noc ([place] w/SOB)  PMH of HTN, DM & HFrEF;  per patient had MI about 10 years ago and no cardiology follow-up . Last week he came to the ED complaining of chest pain and cough and was diagnosed with pneumonia. He was discharged with Augmentin (a week course) and  azithromycin (4 day course).     Reports  \"ran out of \" metformin  2 wks ago / lisinopril for past week.   IN ER:   normal breath sounds/diminished.  Oriented.    , D-Dimer 0.96.   CXR and CTA chest:  suggesting volume overload with bilateral moderate pleural effusions, pulmonary vascular congestion, and interstitial and alveolar edema      11/20   Admit  IP status,  MS  Level of care  for acute on chronic CHF,  Dyspnea on minimal exertion,  b/l Pleural effusion.    Cont telemetry,  obtain echo,   IV Lasix diuresis,  diet - lo carb/ Na 2 g/  fluid restriction 1800 ml .  I/O q " shift.  Daily wgt.  Daily cbc/bmp including renal indices.  Hold home metformin/ Blood sugar management w/accucks qid & SSI.  Fall precautions.  Scd's b/l LE. Routine VS w/pulse ox readings -  supplemental oxygen to keep sats >90%    Anticipated Length of Stay/Certification Statement:   Patient will be admitted on an Inpatient basis with an anticipated length of stay of  less than 2 midnights.       Date: 11/21     Day 2:  breathing improved w/diuresis.  Attempted to wean off O2. Found to be at 80% on RA. He ranged from 87-99% on 1L.   Remains on 2L HS. Looks to possibly have GORGE      Date: 11/22     Day 3: Has surpassed a 2nd midnight with active treatments and services.  His breathing is improved and had no active complaints. Weaned off O2 today, on room air SPO2 95% .  Cont lasix po.  Jardiance 10 mg QD started     ---------------------------------------------------------------------------------------------------------------------------------------------------    ED Treatment-Medication Administration from 11/20/2024 0816 to 11/20/2024 1752         Date/Time Order Dose Route Action     11/20/2024 0918 furosemide (LASIX) injection 20 mg 20 mg Intravenous Given     11/20/2024 0947 hydrocortisone (Solu-CORTEF) injection 200 mg 200 mg Intravenous Given     11/20/2024 1244 diphenhydrAMINE (BENADRYL) injection 50 mg 50 mg Intravenous Given         Scheduled Medications:  aspirin, 81 mg, Oral, Daily  atorvastatin, 40 mg, Oral, HS  enoxaparin, 40 mg, Subcutaneous, Daily  furosemide, 20 mg, Oral, Daily  insulin lispro, 1-5 Units, Subcutaneous, 4x Daily (AC & HS)  lisinopril, 20 mg, Oral, Daily      Continuous IV Infusions:     PRN Meds:  guaiFENesin, 200 mg, Oral, Q4H PRN  nicotine polacrilex, 4 mg, Oral, Q2H PRN      ED Triage Vitals   Temperature Pulse Respirations Blood Pressure SpO2 Pain Score   11/20/24 0828 11/20/24 0828 11/20/24 0828 11/20/24 0828 11/20/24 0828 11/20/24 1821   97.7 °F (36.5 °C) 101 (!) 40 152/94 94  % 3     Weight (last 2 days)       Date/Time Weight    11/21/24 0933 69.5 (153.22)    11/21/24 0716 69.5 (153.22)    11/20/24 1808 69.8 (153.88)    11/20/24 0828 73.2 (161.38)            Vital Signs (last 3 days)       Date/Time Temp Pulse Resp BP SpO2 Nasal Cannula O2 Flow Rate (L/min) O2 Device [place] Coma Scale Score    11/21/24 0716 96.7 °F (35.9 °C) 88 20 130/86 97 % 2 L/min Nasal cannula --    11/20/24 2329 -- -- -- -- 99 % 2 L/min -- --    11/20/24 2328 -- -- -- -- 90 % 1 L/min -- --    11/20/24 2325 -- -- -- -- 87 % 1 L/min Nasal cannula --    11/20/24 2315 -- -- -- -- 80 % -- None (Room air) --    11/20/24 2300 97.5 °F (36.4 °C) 93 20 130/87 97 % -- None (Room air) --    11/20/24 2100 -- -- -- -- -- -- -- 15    11/20/24 2034 -- 111 20 138/74 97 % -- Nasal cannula --    11/20/24 1821 -- -- -- -- -- -- -- --    11/20/24 1808 97.2 °F (36.2 °C) 99 19 162/109 97 % 2 L/min Nasal cannula --    11/20/24 1745 -- 95 20 148/87 95 % 2 L/min Nasal cannula --    11/20/24 1532 -- -- 21 -- -- -- -- --    11/20/24 1530 -- 91 20 140/85 95 % 2 L/min Nasal cannula --    11/20/24 1230 -- 90 -- 134/84 98 % 2 L/min Nasal cannula --    11/20/24 1121 -- -- -- -- -- -- -- 15    11/20/24 1114 -- 86 22 139/84 96 % 2 L/min Nasal cannula --    11/20/24 0857 -- -- 28 -- -- 2 L/min Nasal cannula --    11/20/24 0841 -- -- -- -- -- -- None (Room air) --    11/20/24 0828 97.7 °F (36.5 °C) 101 40 152/94 94 % -- -- --              Pertinent Labs/Diagnostic Test Results:   Radiology:  CTA chest pe study   Final Interpretation by Estuardo Sandoval MD (11/20 1520)      1.  Constellation of findings suggesting volume overload with bilateral moderate pleural effusions, pulmonary vascular congestion, and interstitial and alveolar edema.      2.  No pulmonary emboli.            Workstation performed: [id number]         XR chest 2 views   Final Interpretation by Estuardo Sandoval MD (11/20 1520)      1.  Constellation of findings suggesting volume  overload with bilateral moderate pleural effusions, pulmonary vascular congestion, and interstitial and alveolar edema.      2.  No pulmonary emboli.            Workstation performed: [id number]           Cardiology:  No orders to display     GI:  No orders to display           Results from last 7 days   Lab Units 11/21/24  0449 11/20/24  0834   WBC Thousand/uL 8.84 8.70   HEMOGLOBIN g/dL 12.4 13.2   HEMATOCRIT % 37.7 40.2   PLATELETS Thousands/uL 322 334   TOTAL NEUT ABS Thousands/µL 5.84 6.15         Results from last 7 days   Lab Units 11/21/24  0449 11/20/24  0834   SODIUM mmol/L 140 139   POTASSIUM mmol/L 3.5 3.9   CHLORIDE mmol/L 103 102   CO2 mmol/L 29 29   ANION GAP mmol/L 8 8   BUN mg/dL 11 7   CREATININE mg/dL 0.75 0.81   EGFR ml/min/1.73sq m 94 91   CALCIUM mg/dL 8.7 9.0   MAGNESIUM mg/dL 1.8*  --      Results from last 7 days   Lab Units 11/21/24  0449 11/20/24  0834   AST U/L 13 19   ALT U/L 10 14   ALK PHOS U/L 76 92   TOTAL PROTEIN g/dL 6.7 7.5   ALBUMIN g/dL 3.4* 4.0   TOTAL BILIRUBIN mg/dL 0.80 1.06*     Results from last 7 days   Lab Units 11/21/24  0536 11/20/24  2035   POC GLUCOSE mg/dl 107 226*     Results from last 7 days   Lab Units 11/21/24  0449 11/20/24  0834   GLUCOSE RANDOM mg/dL 107 168*         Results from last 7 days   Lab Units 11/20/24  2019   HEMOGLOBIN A1C % 6.9*   EAG mg/dl 151       Results from last 7 days   Lab Units 11/20/24  1244 11/20/24  1014 11/20/24  0834   HS TNI 0HR ng/L  --   --  13   HS TNI 2HR ng/L  --  12  --    HSTNI D2 ng/L  --  -1  --    HS TNI 4HR ng/L 12  --   --    HSTNI D4 ng/L -1  --   --      Results from last 7 days   Lab Units 11/20/24  0834   D-DIMER QUANTITATIVE ug/ml FEU 0.96*             Results from last 7 days   Lab Units 11/20/24  0834   PROCALCITONIN ng/ml <0.05                 Results from last 7 days   Lab Units 11/20/24  0834   BNP pg/mL 564*                                     Results from last 7 days   Lab Units 11/20/24  0948   CLARITY UA   Clear   COLOR UA  Straw   SPEC GRAV UA  1.005   PH UA  7.0   GLUCOSE UA mg/dl Negative   KETONES UA mg/dl Negative   BLOOD UA  Negative   PROTEIN UA mg/dl Negative   NITRITE UA  Negative   BILIRUBIN UA  Negative   UROBILINOGEN UA mg/dL Negative   LEUKOCYTES UA  Negative       Past Medical History:   Diagnosis Date    HLD (hyperlipidemia)     Hypertension      Present on Admission:   Mixed hyperlipidemia   HTN, goal below 140/90   Tobacco user   Type 2 diabetes mellitus without complication, without long-term current use of insulin (HCC)   Acute CHF (congestive heart failure) ([facility])      Admitting Diagnosis: Shortness of breath [R06.02]  CHF (congestive heart failure) ([facility]) [I50.9]  Pleural effusion, bilateral [J90]  Dyspnea on minimal exertion [R06.09]  Age/Sex: 69 y.o. male    Network Utilization Review Department  ATTENTION: Please call with any questions or concerns to [phone] and carefully listen to the prompts so that you are directed to the right person. All voicemails are confidential.   For Discharge needs, contact Care Management DC Support Team at [phone] opt. 2  Send all requests for admission clinical reviews, approved or denied determinations and any other requests to dedicated fax number below belonging to the campus where the patient is receiving treatment. List of dedicated fax numbers for the Facilities:  FACILITY NAME UR FAX NUMBER   ADMISSION DENIALS (Administrative/Medical Necessity) [phone]   DISCHARGE SUPPORT TEAM (NETWORK) [phone]   PARENT CHILD HEALTH (Maternity/NICU/Pediatrics) [phone]   [facility] [phone]   General acute hospital [phone]   [facility] [phone]   [facility] [phone]   [facility] [phone]   [facility] [phone]   [facility] [phone]   [facility]  [facility] [phone]   [facility] [phone]   [facility] [phone]   [facility] [phone]   [facility] [phone]

## 2024-11-21 NOTE — PROGRESS NOTES
Progress Note - Family Medicine   Name: Ruben Flores 69 y.o. male I MRN: [id number]  Unit/Bed#: 7T [facility] 712-01 I Date of Admission: 11/20/2024   Date of Service: 11/21/2024 I Hospital Day: 1     Assessment & Plan  Acute CHF (congestive heart failure) (HCC)  Wt Readings from Last 3 Encounters:   11/21/24 69.4 kg (153 lb)   11/13/24 71.3 kg (157 lb 3 oz)   04/26/24 70.3 kg (155 lb)     -secondary to poor outpatient follow-up and medication non-adherence; per patient had MI about 10 years ago and no cardiology follow-up   -last echo per [facility] chart review appears to be in 2017 with EF 35% however unable to view echo  -per medication review today, not on any goal directed medical therapy  -suspected GORGE  -dry weight is around 70 kg  -s/p 20mg lasix in ED with improvement     PLAN  -Echocardiogram   -Cont lasix 20 mg QD  -Wean off O2 today, as tolerated  -Sodium 2g restriction  -Fluid restriction 1800ml   -I/O  -Daily weights   -AM CBC, CMP  -Follow up with a sleep study as outpatient  -Follow up with PCP as outpatient  Type 2 diabetes mellitus without complication, without long-term current use of insulin ([facility])  Lab Results   Component Value Date    HGBA1C 6.9 (H) 11/20/2024     -home medications: metformin 500mg BID  -has not used medication in two weeks because he ran out of medications  -Last glucose (CMP) 107    PLAN  -Hold metformin for now  -ISS, ACHS  -Diabetic diet  -AM CBC, CMP  (P) [id number]  HTN, goal below 140/90  Home medication: lisinopril 20mg     PLAN  -Monitor BP  -continue home medication  Mixed hyperlipidemia  -home medication: lipitor 40mg     PLAN  -continue home medication  Tobacco user  -5-6 cigarettes daily for about 38 years; quit 15 days ago    PLAN  -declined NRT  -Nicorette gum per request   - on smoking cessation    24 Hour Events : none  Subjective :  Patient was seen at bedside with attending physician and senior resident. His breathing was improved from time of  admission. He was still on 2L O2 with SPO2 of 97%.    Objective :  Temp:  [96.7 °F (35.9 °C)-97.5 °F (36.4 °C)] 96.7 °F (35.9 °C)  HR:  [] 88  BP: (130-162)/() 130/86  Resp:  [19-21] 20  SpO2:  [80 %-99 %] 97 %  O2 Device: Nasal cannula  Nasal Cannula O2 Flow Rate (L/min):  [1 L/min-2 L/min] 2 L/min    Physical Exam  Vitals and nursing note reviewed.   Constitutional:       General: He is not in acute distress.     Appearance: He is well-developed.      Interventions: Nasal cannula in place.      Comments: 2L O2   Eyes:      Conjunctiva/sclera: Conjunctivae normal.   Cardiovascular:      Rate and Rhythm: Normal rate and regular rhythm.      Heart sounds: No murmur heard.  Pulmonary:      Effort: Pulmonary effort is normal. No respiratory distress.      Breath sounds: Normal breath sounds.   Abdominal:      Palpations: Abdomen is soft.      Tenderness: There is no abdominal tenderness.   Musculoskeletal:         General: No swelling.      Cervical back: Neck supple.      Right lower leg: No edema.      Left lower leg: No edema.   Skin:     General: Skin is warm and dry.      Capillary Refill: Capillary refill takes less than 2 seconds.   Neurological:      Mental Status: He is alert and oriented to person, place, and time.   Psychiatric:         Mood and Affect: Mood normal.         Lab Results: I have reviewed the following results:          VTE Pharmacologic Prophylaxis: Enoxaparin (Lovenox)  VTE Mechanical Prophylaxis: sequential compression device

## 2024-11-21 NOTE — PLAN OF CARE
Problem: PAIN - ADULT  Goal: Verbalizes/displays adequate comfort level or baseline comfort level  Description: Interventions:  - Encourage patient to monitor pain and request assistance  - Assess pain using appropriate pain scale  - Administer analgesics based on type and severity of pain and evaluate response  - Implement non-pharmacological measures as appropriate and evaluate response  - Consider cultural and social influences on pain and pain management  - Notify physician/advanced practitioner if interventions unsuccessful or patient reports new pain  Outcome: Progressing     Problem: INFECTION - ADULT  Goal: Absence or prevention of progression during hospitalization  Description: INTERVENTIONS:  - Assess and monitor for signs and symptoms of infection  - Monitor lab/diagnostic results  - Monitor all insertion sites, i.e. indwelling lines, tubes, and drains  - Monitor endotracheal if appropriate and nasal secretions for changes in amount and color  - [place] appropriate cooling/warming therapies per order  - Administer medications as ordered  - Instruct and encourage patient and family to use good hand hygiene technique  - Identify and instruct in appropriate isolation precautions for identified infection/condition  Outcome: Progressing     Problem: RESPIRATORY - ADULT  Goal: Achieves optimal ventilation and oxygenation  Description: INTERVENTIONS:  - Assess for changes in respiratory status  - Assess for changes in mentation and behavior  - Position to facilitate oxygenation and minimize respiratory effort  - Oxygen administered by appropriate delivery if ordered  - Initiate smoking cessation education as indicated  - Encourage broncho-pulmonary hygiene including cough, deep breathe, Incentive Spirometry  - Assess the need for suctioning and aspirate as needed  - Assess and instruct to report SOB or any respiratory difficulty  - Respiratory Therapy support as indicated  Outcome: Progressing     Problem:  Knowledge Deficit  Goal: Patient/family/caregiver demonstrates understanding of disease process, treatment plan, medications, and discharge instructions  Description: Complete learning assessment and assess knowledge base.  Interventions:  - Provide teaching at level of understanding  - Provide teaching via preferred learning methods  Outcome: Progressing     Problem: DISCHARGE PLANNING  Goal: Discharge to home or other facility with appropriate resources  Description: INTERVENTIONS:  - Identify barriers to discharge w/patient and caregiver  - Arrange for needed discharge resources and transportation as appropriate  - Identify discharge learning needs (meds, wound care, etc.)  - Arrange for interpretive services to assist at discharge as needed  - Refer to Case Management Department for coordinating discharge planning if the patient needs post-hospital services based on physician/advanced practitioner order or complex needs related to functional status, cognitive ability, or social support system  Outcome: Progressing

## 2024-11-22 DIAGNOSIS — I50.23 ACUTE ON CHRONIC SYSTOLIC CONGESTIVE HEART FAILURE (HCC): Primary | ICD-10-CM

## 2024-11-22 LAB
AORTIC ROOT: 2.8 CM
AORTIC VALVE MEAN VELOCITY: 6.4 M/S
APICAL FOUR CHAMBER EJECTION FRACTION: 26 %
ASCENDING AORTA: 3 CM
AV LVOT MEAN GRADIENT: 1 MMHG
AV LVOT PEAK GRADIENT: 2 MMHG
AV MEAN GRADIENT: 2 MMHG
AV PEAK GRADIENT: 4 MMHG
AV VELOCITY RATIO: 0.74
BSA FOR ECHO PROCEDURE: 1.71 M2
DOP CALC AO PEAK VEL: 0.95 M/S
DOP CALC AO VTI: 16.78 CM
DOP CALC LVOT PEAK VEL VTI: 12.55 CM
DOP CALC LVOT PEAK VEL: 0.7 M/S
FRACTIONAL SHORTENING: 5 (ref 28–44)
GLOBAL LONGITUIDAL STRAIN: -9 %
GLUCOSE SERPL-MCNC: 113 MG/DL (ref 65–140)
GLUCOSE SERPL-MCNC: 125 MG/DL (ref 65–140)
GLUCOSE SERPL-MCNC: 127 MG/DL (ref 65–140)
GLUCOSE SERPL-MCNC: 160 MG/DL (ref 65–140)
INTERVENTRICULAR SEPTUM IN DIASTOLE (PARASTERNAL SHORT AXIS VIEW): 0.7 CM
INTERVENTRICULAR SEPTUM: 0.7 CM (ref 0.6–1.1)
LAAS-AP2: 26.6 CM2
LAAS-AP4: 26.9 CM2
LEFT ATRIUM SIZE: 4.7 CM
LEFT ATRIUM VOLUME (MOD BIPLANE): 90 ML
LEFT ATRIUM VOLUME INDEX (MOD BIPLANE): 52.6 ML/M2
LEFT INTERNAL DIMENSION IN SYSTOLE: 5.4 CM (ref 2.1–4)
LEFT VENTRICLE DIASTOLIC VOLUME (MOD BIPLANE): 136 ML
LEFT VENTRICLE DIASTOLIC VOLUME INDEX (MOD BIPLANE): 79.5 ML/M2
LEFT VENTRICLE SYSTOLIC VOLUME (MOD BIPLANE): 105 ML
LEFT VENTRICLE SYSTOLIC VOLUME INDEX (MOD BIPLANE): 61.4 ML/M2
LEFT VENTRICULAR INTERNAL DIMENSION IN DIASTOLE: 5.7 CM (ref 3.5–6)
LEFT VENTRICULAR POSTERIOR WALL IN END DIASTOLE: 0.7 CM
LEFT VENTRICULAR STROKE VOLUME: 19 ML
LV EF: 23 %
LVSV (TEICH): 19 ML
MAGNESIUM SERPL-MCNC: 1.8 MG/DL (ref 1.9–2.7)
MV E'TISSUE VEL-SEP: 6 CM/S
RIGHT ATRIAL 2D VOLUME: 53 ML
RIGHT ATRIUM AREA SYSTOLE A4C: 17.7 CM2
RIGHT VENTRICLE ID DIMENSION: 3.8 CM
SL CV LEFT ATRIUM LENGTH A2C: 6.3 CM
SL CV LV EF: 27
SL CV PED ECHO LEFT VENTRICLE DIASTOLIC VOLUME (MOD BIPLANE) 2D: 162 ML
SL CV PED ECHO LEFT VENTRICLE SYSTOLIC VOLUME (MOD BIPLANE) 2D: 144 ML
TR MAX PG: 56 MMHG
TR PEAK VELOCITY: 3.8 M/S
TRICUSPID ANNULAR PLANE SYSTOLIC EXCURSION: 1.9 CM
TRICUSPID VALVE PEAK REGURGITATION VELOCITY: 3.75 M/S

## 2024-11-22 PROCEDURE — 99232 SBSQ HOSP IP/OBS MODERATE 35: CPT | Performed by: FAMILY MEDICINE

## 2024-11-22 PROCEDURE — 99223 1ST HOSP IP/OBS HIGH 75: CPT | Performed by: INTERNAL MEDICINE

## 2024-11-22 PROCEDURE — 83735 ASSAY OF MAGNESIUM: CPT

## 2024-11-22 PROCEDURE — 82948 REAGENT STRIP/BLOOD GLUCOSE: CPT

## 2024-11-22 RX ORDER — CARVEDILOL 3.12 MG/1
3.12 TABLET ORAL 2 TIMES DAILY WITH MEALS
Status: DISCONTINUED | OUTPATIENT
Start: 2024-11-22 | End: 2024-11-24

## 2024-11-22 RX ORDER — POTASSIUM CHLORIDE 1500 MG/1
40 TABLET, EXTENDED RELEASE ORAL 2 TIMES DAILY
Status: COMPLETED | OUTPATIENT
Start: 2024-11-22 | End: 2024-11-23

## 2024-11-22 RX ORDER — SACUBITRIL AND VALSARTAN 49; 51 MG/1; MG/1
1 TABLET, FILM COATED ORAL 2 TIMES DAILY
Qty: 60 TABLET | Refills: 5 | Status: SHIPPED | OUTPATIENT
Start: 2024-11-22 | End: 2024-11-24

## 2024-11-22 RX ORDER — FLUTICASONE PROPIONATE 50 MCG
1 SPRAY, SUSPENSION (ML) NASAL DAILY
Qty: 16 G | Refills: 0 | Status: SHIPPED | OUTPATIENT
Start: 2024-11-22 | End: 2024-11-24

## 2024-11-22 RX ORDER — LISINOPRIL 20 MG/1
20 TABLET ORAL DAILY
Qty: 90 TABLET | Refills: 1 | Status: SHIPPED | OUTPATIENT
Start: 2024-11-22 | End: 2024-11-24

## 2024-11-22 RX ORDER — ASPIRIN 81 MG/1
81 TABLET ORAL DAILY
Qty: 90 TABLET | Refills: 2 | Status: SHIPPED | OUTPATIENT
Start: 2024-11-22 | End: 2024-11-24

## 2024-11-22 RX ORDER — MAGNESIUM SULFATE HEPTAHYDRATE 40 MG/ML
2 INJECTION, SOLUTION INTRAVENOUS ONCE
Status: COMPLETED | OUTPATIENT
Start: 2024-11-22 | End: 2024-11-23

## 2024-11-22 RX ADMIN — ENOXAPARIN SODIUM 40 MG: 40 INJECTION SUBCUTANEOUS at 08:38

## 2024-11-22 RX ADMIN — POTASSIUM CHLORIDE 40 MEQ: 1500 TABLET, EXTENDED RELEASE ORAL at 17:04

## 2024-11-22 RX ADMIN — FUROSEMIDE 20 MG: 20 TABLET ORAL at 08:38

## 2024-11-22 RX ADMIN — INSULIN LISPRO 1 UNITS: 100 INJECTION, SOLUTION INTRAVENOUS; SUBCUTANEOUS at 21:23

## 2024-11-22 RX ADMIN — EMPAGLIFLOZIN 10 MG: 10 TABLET, FILM COATED ORAL at 08:41

## 2024-11-22 RX ADMIN — ASPIRIN 81 MG: 81 TABLET, COATED ORAL at 08:38

## 2024-11-22 RX ADMIN — LISINOPRIL 20 MG: 20 TABLET ORAL at 08:38

## 2024-11-22 RX ADMIN — MAGNESIUM SULFATE HEPTAHYDRATE 2 G: 40 INJECTION, SOLUTION INTRAVENOUS at 16:29

## 2024-11-22 RX ADMIN — CARVEDILOL 3.12 MG: 3.12 TABLET, FILM COATED ORAL at 16:29

## 2024-11-22 RX ADMIN — CARVEDILOL 3.12 MG: 3.12 TABLET, FILM COATED ORAL at 08:40

## 2024-11-22 RX ADMIN — ATORVASTATIN CALCIUM 40 MG: 40 TABLET, FILM COATED ORAL at 21:23

## 2024-11-22 NOTE — CONSULTS
Consultation - Cardiology   Name: Ruben Flores 69 y.o. male I MRN: [id number]  Unit/Bed#: 7T [facility] 712-01 I Date of Admission: 11/20/2024   Date of Service: 11/22/2024 I Hospital Day: 2   Inpatient consult to Cardiology  Consult performed by: Maikel Garrison MD  Consult ordered by: Wing Aponte MD        Physician Requesting Evaluation: Andrea Sawant*   Reason for Evaluation / Principal Problem: CHF, acute systolic    Assessment & Plan  Acute CHF (congestive heart failure) (HCC)  Wt Readings from Last 3 Encounters:   11/22/24 69.9 kg (154 lb 1.6 oz)   11/13/24 71.3 kg (157 lb 3 oz)   04/26/24 70.3 kg (155 lb)     Patient admitted with sudden onset of shortness of breath and a chest x-ray which demonstrated cardiomegaly with acute pulmonary edema with bilateral pleural effusions.  Patient was diuresed and placed on oxygen.  He is now on room air.  Chest x-ray has not been repeated to evaluate whether pulmonary edema has cleared and the status of his bilateral pleural effusions.  An echocardiogram was performed yesterday which demonstrated an ejection fraction of 27% and I looked at the actual images and the ejection fraction may even be lower.      Lasix 20 mg daily p.o.  Continue Jardiance 10 mg daily, just started today  Continue aspirin 81 mg daily  Continue carvedilol 3.125 mg 2 times daily with meals  Discontinue lisinopril  Begin Entresto 49/51 2 times daily after a 48-hour washout of the lisinopril  Obtain a PA and lateral chest x-ray to see the status of his pulmonary edema and the status of his bilateral pleural effusions.  If moderate-sized effusions are still present, consider thoracentesis  Have a discussion with patient's friend and his friend's wife to see how supportive they are in him having a LifeVest.  See whether either of them speak English.  Make a decision whether patient is a LifeVest candidate or whether there are too many complicated logistic problems to  place a LifeVest on this patient  To have a LifeVest, place an order next week and talk to Juan regarding delivery  Cody patient a map to get to our office  Arrange for patient to have an office appointment 3 days after discharge with one of the PACs  Told the patient has very good insurance and that Jardiance and Entresto will not be a problem of affordability sent [facility] and we should be able to get prices    Mixed hyperlipidemia  Continue atorvastatin 40 mg daily    Primary hypertension  Patient needs better blood pressure control.  With an ejection fraction in the 20s of goal of 140/90 is not adequate.  The goal should be for systolic less than 130 and less than 120 is actually better if he is not symptomatically lightheaded  We will hold off at present increasing antihypertensives because if Entresto is added, that will lower his blood pressure.  In addition if he does not reach blood pressure goals on Entresto 49/51 he can be increased to Entresto 97/103    Tobacco user  Current everyday smoker started in 2024  Encourage patient to stop smoking    Type 2 diabetes mellitus without complication, without long-term current use of insulin ([facility])  Lab Results   Component Value Date    HGBA1C 6.9 (H) 11/20/2024       Recent Labs     11/21/24  2022 11/22/24  0528 11/22/24  1108 11/22/24  1529   POCGLU 155* 127 125 113       Blood Sugar Average: Last 72 hrs:  (P) 147.75    Defer management to primary team.  A hemoglobin A1c under 7 is a good value.      History of Present Illness   Ruben Flores is a 69 y.o. male who presents with severe shortness of breath.  He had been in the ED a week ago with similar symptoms but was released feeling better.  He has been diuresed and lungs sound clear but chest x-ray has not been repeated.  Echocardiogram performed yesterday demonstrated an ejection fraction of 27%.  I have reviewed the echo myself and  believe the ejection fraction is in the range of 20 to 25%.  He gives a history of having an MI in the past but this is probably not true.  He had a nuclear stress test which demonstrated ischemia and an ejection fraction of 36%.  He subsequently had a cardiac catheterization.  The catheterization report is not available but an office visit from Dr. Hewitt revealed that he had no obstructive coronary artery disease and that his ejection fraction was well-preserved.    He has not had a cardiac problem until recently.  He was admitted with a chest x-ray demonstrating cardiomegaly a and pulmonary edema.  He is subsequently been diuresed and feels comfortable on room air.      PROBLEM LIST:    Patient Active Problem List    Diagnosis Date Noted    Acute CHF (congestive heart failure) ([facility]) 11/20/2024    Screening for AAA (aortic abdominal aneurysm) 04/26/2024    Seasonal allergies 05/20/2021    Screening for AAA (abdominal aortic aneurysm) 08/20/2020    Callus of foot 08/20/2020    Type 2 diabetes mellitus without complication, without long-term current use of insulin ([facility]) 03/20/2019    Primary hypertension 02/27/2018    Mixed hyperlipidemia 02/27/2018    Systolic heart failure ([facility]) 02/27/2018    Tobacco user 02/27/2018       PREVIOUS WEIGHTS:   Weight (last 2 days)       Date/Time Weight    11/22/24 0600 69.9 (154.1)    11/21/24 1210 69.4 (153)    11/21/24 0933 69.5 (153.22)    11/21/24 0716 69.5 (153.22)    11/20/24 1808 69.8 (153.88)    11/20/24 0828 73.2 (161.38)            Wt Readings from Last 2 Encounters:   11/22/24 69.9 kg (154 lb 1.6 oz)   11/13/24 71.3 kg (157 lb 3 oz)         Intake/Output Summary (Last 24 hours) at 11/22/2024 1547  Last data filed at 11/22/2024 1335  Gross per 24 hour   Intake 920 ml   Output 150 ml   Net 770 ml       Labs/Data:        Results from last 7 days   Lab Units 11/21/24  0449 11/20/24  0834   WBC Thousand/uL 8.84 8.70   HEMOGLOBIN g/dL 12.4 13.2   HEMATOCRIT % 37.7 40.2   MCV fL  90 92   MCH pg 29.7 30.2   MCHC g/dL 32.9 32.8   PLATELETS Thousands/uL 322 334     Results from last 7 days   Lab Units 11/21/24  0449 11/20/24  0834   EGFR ml/min/1.73sq m 94 91   SODIUM mmol/L 140 139   POTASSIUM mmol/L 3.5 3.9   CHLORIDE mmol/L 103 102   CO2 mmol/L 29 29   BUN mg/dL 11 7   CREATININE mg/dL 0.75 0.81     Results from last 7 days   Lab Units 11/22/24  0439 11/21/24  0449 11/20/24  0834   CALCIUM mg/dL  --  8.7 9.0   AST U/L  --  13 19   ALT U/L  --  10 14   ALK PHOS U/L  --  76 92   MAGNESIUM mg/dL 1.8* 1.8*  --      Lab Results   Component Value Date    NTBNP 79.0 12/02/2019     Lab Results   Component Value Date    CHOLESTEROL 176 01/30/2024    TRIG 125 01/30/2024    HDL 59 01/30/2024    LDLCALC 92 01/30/2024    HGBA1C 6.9 (H) 11/20/2024       Current Facility-Administered Medications:     aspirin (ECOTRIN LOW STRENGTH) EC tablet 81 mg, 81 mg, Oral, Daily, Guerline Weinberg MD, 81 mg at 11/22/24 0838    atorvastatin (LIPITOR) tablet 40 mg, 40 mg, Oral, HS, Guerline Weinberg MD, 40 mg at 11/21/24 2103    carvedilol (COREG) tablet 3.125 mg, 3.125 mg, Oral, BID With Meals, Wing Aponte MD, 3.125 mg at 11/22/24 0840    Empagliflozin (JARDIANCE) tablet 10 mg, 10 mg, Oral, Daily, Wing Aponte MD, 10 mg at 11/22/24 0841    enoxaparin (LOVENOX) subcutaneous injection 40 mg, 40 mg, Subcutaneous, Daily, Guerline Weinberg MD, 40 mg at 11/22/24 0838    furosemide (LASIX) tablet 20 mg, 20 mg, Oral, Daily, Wing Aponte MD, 20 mg at 11/22/24 0838    guaiFENesin (ROBITUSSIN) oral liquid 200 mg, 200 mg, Oral, Q4H PRN, Johnny Lyons MD    insulin lispro (HumALOG/ADMELOG) 100 units/mL subcutaneous injection 1-5 Units, 1-5 Units, Subcutaneous, 4x Daily (AC & HS), 1 Units at 11/21/24 2104 **AND** Fingerstick Glucose (POCT), , , 4x Daily AC and at bedtime, Pricila Junior MD    nicotine polacrilex (NICORETTE) gum 4 mg, 4 mg, Oral, Q2H PRN, Guerline Weinberg MD  Allergies   Allergen  "Reactions    Pollen Extract Itching    Ioversol Rash       Invasive Devices       Peripheral Intravenous Line  Duration             Peripheral IV 11/20/24 Right Antecubital 2 days                    Review of Systems   Constitutional: Negative.    HENT: Negative.     Eyes: Negative.    Respiratory:  Negative for cough, chest tightness, shortness of breath and wheezing.    Cardiovascular:  Negative for chest pain, palpitations and leg swelling.   Gastrointestinal: Negative.    Endocrine: Negative.    Musculoskeletal: Negative.    Skin: Negative.    Allergic/Immunologic: Negative.    Neurological: Negative.    Hematological: Negative.    Psychiatric/Behavioral: Negative.         Vitals: /89 (BP Location: Left arm)   Pulse 97   Temp 97.9 °F (36.6 °C) (Temporal)   Resp 18   Ht 5' 2\" (1.575 m)   Wt 69.9 kg (154 lb 1.6 oz)   SpO2 92%   BMI 28.19 kg/m²     Physical Exam  Vitals reviewed.   Constitutional:       General: He is not in acute distress.     Appearance: He is well-developed.   HENT:      Head: Normocephalic and atraumatic.   Neck:      Thyroid: No thyromegaly.      Vascular: No carotid bruit or JVD.      Trachea: No tracheal deviation.   Cardiovascular:      Rate and Rhythm: Normal rate and regular rhythm.      Pulses: Normal pulses.      Heart sounds: Normal heart sounds. No murmur heard.     No friction rub. No gallop.   Pulmonary:      Effort: Pulmonary effort is normal. No respiratory distress.      Breath sounds: Normal breath sounds. No wheezing, rhonchi or rales.   Chest:      Chest wall: No tenderness.   Abdominal:      General: Bowel sounds are normal. There is no distension.      Palpations: Abdomen is soft.      Tenderness: There is no abdominal tenderness.   Musculoskeletal:      Cervical back: Normal range of motion and neck supple.      Right lower leg: No edema.      Left lower leg: No edema.   Skin:     General: Skin is warm and dry.   Neurological:      General: No focal deficit " "present.      Mental Status: He is alert and oriented to person, place, and time.      Gait: Gait normal.   Psychiatric:         Mood and Affect: Mood normal.         Behavior: Behavior normal.         Thought Content: Thought content normal.         Judgment: Judgment normal.           Portions of the record may have been created with voice recognition software. Occasional wrong word or \"sound a like\" substitutions may have occurred due to the inherent limitations of voice recognition software. Read the chart carefully and recognize, using context, where substitutions have occurred.    ======================================================      Imaging Results Review: I personally reviewed the following image studies in PACS and associated radiology reports: chest xray and CT chest. My interpretation of the radiology images/reports is: Cardiomegaly, Pulmonary edema and bilateral moderate size pleural effusions. No pulmonary embolism.  Other Study Results Review: EKG was personally reviewed and my interpretation is: Sinus tachycardia, interventricular conduction delay, nonspecific ST-T wave changes.. My interpretation of other studies include: Echocardiogram: Mildly dilated left ventricle with severe global hypokinesis and an ejection fraction of 20 to 25%.          "

## 2024-11-22 NOTE — ASSESSMENT & PLAN NOTE
Wt Readings from Last 3 Encounters:   11/22/24 69.9 kg (154 lb 1.6 oz)   11/13/24 71.3 kg (157 lb 3 oz)   04/26/24 70.3 kg (155 lb)     -secondary to poor outpatient follow-up and medication non-adherence; per patient had MI about 10 years ago and no cardiology follow-up   -last echo per [facility] chart review appears to be in 2017 with EF 35% however unable to view echo  -per medication review today, not on any goal directed medical therapy  -suspected GORGE  -dry weight is around 70 kg  -Echo (11/22/2024):   Left Ventricle: Left ventricular cavity size is normal. Wall thickness is normal. The left ventricular ejection fraction is 27% by visual estimation. Systolic function is severely reduced. Global longitudinal strain is reduced at -9%. Strain shows some degree of apical sparing.  Can consider additional workup as clinically indicated. There is severe global hypokinesis with regional variation. Diastolic function is severely abnormal, consistent with ungraded restrictive relaxation.  Left atrial filling pressure is elevated.    Right Ventricle: Right ventricular cavity size is normal. Systolic function is low normal.    Left Atrium: The atrium is severely dilated.    Aortic Valve: There is trace regurgitation.    Mitral Valve: There is mild annular calcification. There is mild regurgitation.    Tricuspid Valve: There is mild to moderate regurgitation. Pulmonary artery systolic pressures are estimated at 59 mmHg.    There is no study for comparison.  -Weaned off O2 today, on room air SPO2 95%    PLAN  -Cont lasix 20 mg QD  -Jardiance 10 mg QD started   -Cardiac consult appreciated  -Sodium 2g restriction  -Fluid restriction 1800ml   -I/O  -Daily weights   -AM CBC, CMP  -Follow up with a sleep study as outpatient  -Follow up with PCP as outpatient

## 2024-11-22 NOTE — ASSESSMENT & PLAN NOTE
Wt Readings from Last 3 Encounters:   11/22/24 69.9 kg (154 lb 1.6 oz)   11/13/24 71.3 kg (157 lb 3 oz)   04/26/24 70.3 kg (155 lb)     Patient admitted with sudden onset of shortness of breath and a chest x-ray which demonstrated cardiomegaly with acute pulmonary edema with bilateral pleural effusions.  Patient was diuresed and placed on oxygen.  He is now on room air.  Chest x-ray has not been repeated to evaluate whether pulmonary edema has cleared and the status of his bilateral pleural effusions.  An echocardiogram was performed yesterday which demonstrated an ejection fraction of 27% and I looked at the actual images and the ejection fraction may even be lower.      Lasix 20 mg daily p.o.  Continue Jardiance 10 mg daily, just started today  Continue aspirin 81 mg daily  Continue carvedilol 3.125 mg 2 times daily with meals  Discontinue lisinopril  Begin Entresto 49/51 2 times daily after a 48-hour washout of the lisinopril  Obtain a PA and lateral chest x-ray to see the status of his pulmonary edema and the status of his bilateral pleural effusions.  If moderate-sized effusions are still present, consider thoracentesis  Have a discussion with patient's friend and his friend's wife to see how supportive they are in him having a LifeVest.  See whether either of them speak English.  Make a decision whether patient is a LifeVest candidate or whether there are too many complicated logistic problems to place a LifeVest on this patient  To have a LifeVest, place an order next week and talk to Juan regarding delivery  Cody patient a map to get to our office  Arrange for patient to have an office appointment 3 days after discharge with one of the PACs  Told the patient has very good insurance and that Jardiance and Entresto will not be a problem of affordability sent prescriptions for these medicines to [facility] pharmacy at [facility] and we should be able to get prices

## 2024-11-22 NOTE — CASE MANAGEMENT
Case Management Progress Note    Patient name Ruben Flores  Location 7T [facility] 712/7T [facility] 712-01 MRN [id number]  : 1955 Date 2024       LOS (days): 2  Geometric Mean LOS (GMLOS) (days): 3.9  Days to GMLOS:2        OBJECTIVE:        Current admission status: Inpatient  Preferred Pharmacy:    [facility] [place] [id number]  Phone: [phone] Fax: [phone]    Primary Care Provider: Yanira Dickey MD    [facility]  Secondary Insurance:     PROGRESS NOTE:    Price check Entresto and Jardiance at  [facility] and no co-pays.

## 2024-11-22 NOTE — ASSESSMENT & PLAN NOTE
Patient needs better blood pressure control.  With an ejection fraction in the 20s of goal of 140/90 is not adequate.  The goal should be for systolic less than 130 and less than 120 is actually better if he is not symptomatically lightheaded  We will hold off at present increasing antihypertensives because if Entresto is added, that will lower his blood pressure.  In addition if he does not reach blood pressure goals on Entresto 49/51 he can be increased to Entresto 97/103

## 2024-11-22 NOTE — ASSESSMENT & PLAN NOTE
Lab Results   Component Value Date    HGBA1C 6.9 (H) 11/20/2024       Recent Labs     11/21/24 2022 11/22/24  0528 11/22/24  1108 11/22/24  1529   POCGLU 155* 127 125 113       Blood Sugar Average: Last 72 hrs:  (P) 147.75    Defer management to primary team.  A hemoglobin A1c under 7 is a good value.

## 2024-11-22 NOTE — PROGRESS NOTES
Progress Note - Family Medicine   Name: Ruben Flores 69 y.o. male I MRN: [id number]  Unit/Bed#: 7T [facility] 712-01 I Date of Admission: 11/20/2024   Date of Service: 11/22/2024 I Hospital Day: 2     Assessment & Plan  Acute CHF (congestive heart failure) (HCC)  Wt Readings from Last 3 Encounters:   11/22/24 69.9 kg (154 lb 1.6 oz)   11/13/24 71.3 kg (157 lb 3 oz)   04/26/24 70.3 kg (155 lb)     -secondary to poor outpatient follow-up and medication non-adherence; per patient had MI about 10 years ago and no cardiology follow-up   -last echo per [facility] chart review appears to be in 2017 with EF 35% however unable to view echo  -per medication review today, not on any goal directed medical therapy  -suspected GORGE  -dry weight is around 70 kg  -Echo (11/22/2024):   Left Ventricle: Left ventricular cavity size is normal. Wall thickness is normal. The left ventricular ejection fraction is 27% by visual estimation. Systolic function is severely reduced. Global longitudinal strain is reduced at -9%. Strain shows some degree of apical sparing.  Can consider additional workup as clinically indicated. There is severe global hypokinesis with regional variation. Diastolic function is severely abnormal, consistent with ungraded restrictive relaxation.  Left atrial filling pressure is elevated.    Right Ventricle: Right ventricular cavity size is normal. Systolic function is low normal.    Left Atrium: The atrium is severely dilated.    Aortic Valve: There is trace regurgitation.    Mitral Valve: There is mild annular calcification. There is mild regurgitation.    Tricuspid Valve: There is mild to moderate regurgitation. Pulmonary artery systolic pressures are estimated at 59 mmHg.    There is no study for comparison.  -Weaned off O2 today, on room air SPO2 95%    PLAN  -Cont lasix 20 mg QD  -Jardiance 10 mg QD started   -Cardiac consult appreciated  -Sodium 2g restriction  -Fluid restriction 1800ml   -I/O  -Daily weights   -AM  CBC, CMP  -Follow up with a sleep study as outpatient  -Follow up with PCP as outpatient  Type 2 diabetes mellitus without complication, without long-term current use of insulin ([facility])  Lab Results   Component Value Date    HGBA1C 6.9 (H) 11/20/2024     -home medications: metformin 500mg BID  -has not used medication in two weeks because he ran out of medications  -Last glucose (CMP) 107    PLAN  -Hold metformin for now  -ISS, ACHS  -Diabetic diet  -AM CBC, CMP  (P) [id number]  HTN, goal below 140/90  Home medication: lisinopril 20mg     PLAN  -Monitor BP  -continue home medication  Mixed hyperlipidemia  -home medication: lipitor 40mg     PLAN  -continue home medication  Tobacco user  -5-6 cigarettes daily for about 38 years; quit 15 days ago    PLAN  -declined NRT  -Nicorette gum per request   - on smoking cessation    24 Hour Events : None  Subjective : Patient was seen at bedside with attending physician and senior resident. His breathing is improved and had no active complaints.    Objective :  Temp:  [97 °F (36.1 °C)-98.8 °F (37.1 °C)] 97.7 °F (36.5 °C)  HR:  [] 88  BP: (123-155)/() 138/80  Resp:  [18-20] 18  SpO2:  [94 %-98 %] 95 %  O2 Device: None (Room air)    Physical Exam  Vitals and nursing note reviewed.   Constitutional:       General: He is not in acute distress.     Appearance: He is well-developed.   HENT:      Head: Normocephalic and atraumatic.   Eyes:      Conjunctiva/sclera: Conjunctivae normal.   Cardiovascular:      Rate and Rhythm: Normal rate and regular rhythm.      Heart sounds: No murmur heard.  Pulmonary:      Effort: Pulmonary effort is normal. No respiratory distress.      Breath sounds: Normal breath sounds.   Abdominal:      Palpations: Abdomen is soft.      Tenderness: There is no abdominal tenderness.   Musculoskeletal:         General: No swelling.      Cervical back: Neck supple.   Skin:     General: Skin is warm and dry.      Capillary Refill:  Capillary refill takes less than 2 seconds.   Neurological:      Mental Status: He is alert.   Psychiatric:         Mood and Affect: Mood normal.         Lab Results: I have reviewed the following results:          VTE Pharmacologic Prophylaxis: Enoxaparin (Lovenox)  VTE Mechanical Prophylaxis: sequential compression device

## 2024-11-22 NOTE — ASSESSMENT & PLAN NOTE
Lab Results   Component Value Date    HGBA1C 6.9 (H) 11/20/2024     -home medications: metformin 500mg BID  -has not used medication in two weeks because he ran out of medications  -Last glucose (CMP) 107    PLAN  -Hold metformin for now  -ISS, ACHS  -Diabetic diet  -AM CBC, CMP  (P) [id number]

## 2024-11-22 NOTE — PLAN OF CARE
Weaned to RA. Patient does desat to mid-high 80s when asleep but comes right back up. Denies CP or SOB overnight. Plan for f/u sleep study. VSS      Problem: Potential for Falls  Goal: Patient will remain free of falls  Description: INTERVENTIONS:  - Educate patient/family on patient safety including physical limitations  - Instruct patient to call for assistance with activity   - Consult OT/PT to assist with strengthening/mobility   - Keep Call bell within reach  - Keep bed low and locked with side rails adjusted as appropriate  - Keep care items and personal belongings within reach  - Initiate and maintain comfort rounds  - Make Fall Risk Sign visible to staff  Problem: SAFETY ADULT  Goal: Patient will remain free of falls  Description: INTERVENTIONS:  - Educate patient/family on patient safety including physical limitations  - Instruct patient to call for assistance with activity   - Consult OT/PT to assist with strengthening/mobility   - Keep Call bell within reach  - Keep bed low and locked with side rails adjusted as appropriate  - Keep care items and personal belongings within reach  - Initiate and maintain comfort rounds  - Make Fall Risk Sign visible to staff  Problem: RESPIRATORY - ADULT  Goal: Achieves optimal ventilation and oxygenation  Description: INTERVENTIONS:  - Assess for changes in respiratory status  - Assess for changes in mentation and behavior  - Position to facilitate oxygenation and minimize respiratory effort  - Oxygen administered by appropriate delivery if ordered  - Initiate smoking cessation education as indicated  - Encourage broncho-pulmonary hygiene including cough, deep breathe, Incentive Spirometry  - Assess the need for suctioning and aspirate as needed  - Assess and instruct to report SOB or any respiratory difficulty  - Respiratory Therapy support as indicated  11/22/2024 0539 by Orlin Laureano RN  Outcome: Progressing  11/22/2024 0538 by Orlin Laureano RN  Outcome:  Progressing     - Apply yellow socks and bracelet for high fall risk patients  - Consider moving patient to room near nurses station  11/22/2024 0539 by Orlin Laureano RN  Outcome: Progressing  11/22/2024 0538 by Orlin Laureano RN  Outcome: Progressing     - Apply yellow socks and bracelet for high fall risk patients  - Consider moving patient to room near nurses station  11/22/2024 0539 by Orlin Laureano RN  Outcome: Progressing  11/22/2024 0538 by Orlin Laureano RN  Outcome: Progressing     Problem: PAIN - ADULT  Goal: Verbalizes/displays adequate comfort level or baseline comfort level  Description: Interventions:  - Encourage patient to monitor pain and request assistance  - Assess pain using appropriate pain scale  - Administer analgesics based on type and severity of pain and evaluate response  - Implement non-pharmacological measures as appropriate and evaluate response  - Consider cultural and social influences on pain and pain management  - Notify physician/advanced practitioner if interventions unsuccessful or patient reports new pain  11/22/2024 0539 by Orlin Laureano RN  Outcome: Progressing  11/22/2024 0538 by Orlin Laureano RN  Outcome: Progressing

## 2024-11-23 ENCOUNTER — APPOINTMENT (INPATIENT)
Dept: RADIOLOGY | Facility: HOSPITAL | Age: 69
DRG: 291 | End: 2024-11-23
Payer: COMMERCIAL

## 2024-11-23 LAB
ALBUMIN SERPL BCG-MCNC: 3.8 G/DL (ref 3.5–5)
ALP SERPL-CCNC: 80 U/L (ref 34–104)
ALT SERPL W P-5'-P-CCNC: 12 U/L (ref 7–52)
ANION GAP SERPL CALCULATED.3IONS-SCNC: 9 MMOL/L (ref 4–13)
AST SERPL W P-5'-P-CCNC: 15 U/L (ref 13–39)
BILIRUB SERPL-MCNC: 0.84 MG/DL (ref 0.2–1)
BUN SERPL-MCNC: 12 MG/DL (ref 5–25)
CALCIUM SERPL-MCNC: 8.9 MG/DL (ref 8.4–10.2)
CHLORIDE SERPL-SCNC: 101 MMOL/L (ref 96–108)
CO2 SERPL-SCNC: 26 MMOL/L (ref 21–32)
CREAT SERPL-MCNC: 0.82 MG/DL (ref 0.6–1.3)
GFR SERPL CREATININE-BSD FRML MDRD: 90 ML/MIN/1.73SQ M
GLUCOSE SERPL-MCNC: 104 MG/DL (ref 65–140)
GLUCOSE SERPL-MCNC: 105 MG/DL (ref 65–140)
GLUCOSE SERPL-MCNC: 106 MG/DL (ref 65–140)
GLUCOSE SERPL-MCNC: 133 MG/DL (ref 65–140)
GLUCOSE SERPL-MCNC: 151 MG/DL (ref 65–140)
MAGNESIUM SERPL-MCNC: 2.3 MG/DL (ref 1.9–2.7)
POTASSIUM SERPL-SCNC: 4.1 MMOL/L (ref 3.5–5.3)
PROT SERPL-MCNC: 7.3 G/DL (ref 6.4–8.4)
SODIUM SERPL-SCNC: 136 MMOL/L (ref 135–147)

## 2024-11-23 PROCEDURE — 71046 X-RAY EXAM CHEST 2 VIEWS: CPT

## 2024-11-23 PROCEDURE — 83735 ASSAY OF MAGNESIUM: CPT

## 2024-11-23 PROCEDURE — 80053 COMPREHEN METABOLIC PANEL: CPT

## 2024-11-23 PROCEDURE — 99232 SBSQ HOSP IP/OBS MODERATE 35: CPT | Performed by: FAMILY MEDICINE

## 2024-11-23 PROCEDURE — 82948 REAGENT STRIP/BLOOD GLUCOSE: CPT

## 2024-11-23 RX ADMIN — MELATONIN TAB 3 MG 3 MG: 3 TAB at 01:55

## 2024-11-23 RX ADMIN — ENOXAPARIN SODIUM 40 MG: 40 INJECTION SUBCUTANEOUS at 08:32

## 2024-11-23 RX ADMIN — CARVEDILOL 3.12 MG: 3.12 TABLET, FILM COATED ORAL at 08:31

## 2024-11-23 RX ADMIN — EMPAGLIFLOZIN 10 MG: 10 TABLET, FILM COATED ORAL at 08:32

## 2024-11-23 RX ADMIN — POTASSIUM CHLORIDE 40 MEQ: 1500 TABLET, EXTENDED RELEASE ORAL at 08:31

## 2024-11-23 RX ADMIN — MELATONIN TAB 3 MG 3 MG: 3 TAB at 21:05

## 2024-11-23 RX ADMIN — CARVEDILOL 3.12 MG: 3.12 TABLET, FILM COATED ORAL at 16:00

## 2024-11-23 RX ADMIN — FUROSEMIDE 20 MG: 20 TABLET ORAL at 08:31

## 2024-11-23 RX ADMIN — ATORVASTATIN CALCIUM 40 MG: 40 TABLET, FILM COATED ORAL at 21:05

## 2024-11-23 RX ADMIN — ASPIRIN 81 MG: 81 TABLET, COATED ORAL at 08:31

## 2024-11-23 RX ADMIN — INSULIN LISPRO 1 UNITS: 100 INJECTION, SOLUTION INTRAVENOUS; SUBCUTANEOUS at 15:59

## 2024-11-23 NOTE — PROGRESS NOTES
Progress Note - Family Medicine   Name: Ruben Flores 69 y.o. male I MRN: [id number]  Unit/Bed#: 7T [facility] 712-01 I Date of Admission: 11/20/2024   Date of Service: 11/23/2024 I Hospital Day: 3     Assessment & Plan  Acute CHF (congestive heart failure) (HCC)  Wt Readings from Last 3 Encounters:   11/23/24 67.9 kg (149 lb 11.1 oz)   11/13/24 71.3 kg (157 lb 3 oz)   04/26/24 70.3 kg (155 lb)     -secondary to poor outpatient follow-up and medication non-adherence; per patient had MI about 10 years ago and no cardiology follow-up   -last echo per [facility] chart review appears to be in 2017 with EF 35% however unable to view echo  -per medication review today, not on any goal directed medical therapy  -suspected GORGE  -dry weight is around 70 kg  -Echo (11/22/2024):   Left Ventricle: Left ventricular cavity size is normal. Wall thickness is normal. The left ventricular ejection fraction is 27% by visual estimation. Systolic function is severely reduced. Global longitudinal strain is reduced at -9%. Strain shows some degree of apical sparing.  Can consider additional workup as clinically indicated. There is severe global hypokinesis with regional variation. Diastolic function is severely abnormal, consistent with ungraded restrictive relaxation.  Left atrial filling pressure is elevated.    Right Ventricle: Right ventricular cavity size is normal. Systolic function is low normal.    Left Atrium: The atrium is severely dilated.    Aortic Valve: There is trace regurgitation.    Mitral Valve: There is mild annular calcification. There is mild regurgitation.    Tricuspid Valve: There is mild to moderate regurgitation. Pulmonary artery systolic pressures are estimated at 59 mmHg.    There is no study for comparison.  -On room air SPO2 96% today    PLAN  -Cont lasix 20 mg QD  -Jardiance 10 mg QD started   -As per cardio to start Entresto tomorrow and monitor BP   -Sodium 2g restriction  -Fluid restriction 1800ml   -I/O  -Daily  weights   -AM CBC, CMP  -Follow up with a sleep study as outpatient  -Follow up with PCP as outpatient  Type 2 diabetes mellitus without complication, without long-term current use of insulin ([facility])  Lab Results   Component Value Date    HGBA1C 6.9 (H) 11/20/2024     -home medications: metformin 500mg BID  -has not used medication in two weeks because he ran out of medications  -Last glucose (CMP) 105    PLAN  -Hold metformin for now  -ISS, ACHS  -Diabetic diet  -AM CBC, CMP  Primary hypertension  Home medication: lisinopril 20mg     PLAN  -Monitor BP  -Cardiac consult appreciated  -As per cardio discontinued lisinopril for 48 hours starting 11/22/24, in order to be started on Entresto tomorrow 11/24/24    Mixed hyperlipidemia  -home medication: lipitor 40mg     PLAN  -continue home medication  Tobacco user  -5-6 cigarettes daily for about 38 years; quit 15 days ago    PLAN  -declined NRT  -Nicorette gum per request   - on smoking cessation    24 Hour Events : none  Subjective : Patient was seen at bedside with attending physician and senior resident. He had no active complaints.     Objective :  Temp:  [97.2 °F (36.2 °C)-98.2 °F (36.8 °C)] 97.2 °F (36.2 °C)  HR:  [] 86  BP: (106-145)/(80-98) 117/80  Resp:  [18] 18  SpO2:  [92 %-96 %] 96 %  O2 Device: None (Room air)    Physical Exam  Vitals and nursing note reviewed.   Constitutional:       General: He is not in acute distress.     Appearance: He is well-developed.   HENT:      Head: Normocephalic and atraumatic.   Eyes:      Conjunctiva/sclera: Conjunctivae normal.   Cardiovascular:      Rate and Rhythm: Normal rate and regular rhythm.      Pulses: Normal pulses.      Heart sounds: Normal heart sounds. No murmur heard.  Pulmonary:      Effort: Pulmonary effort is normal. No respiratory distress.      Breath sounds: Normal breath sounds.   Musculoskeletal:         General: No swelling.      Cervical back: Neck supple.   Skin:     General: Skin is warm  and dry.      Capillary Refill: Capillary refill takes less than 2 seconds.   Neurological:      Mental Status: He is alert.   Psychiatric:         Mood and Affect: Mood normal.         Lab Results: I have reviewed the following results:          VTE Pharmacologic Prophylaxis: Enoxaparin (Lovenox)  VTE Mechanical Prophylaxis: sequential compression device

## 2024-11-23 NOTE — ASSESSMENT & PLAN NOTE
Wt Readings from Last 3 Encounters:   11/23/24 67.9 kg (149 lb 11.1 oz)   11/13/24 71.3 kg (157 lb 3 oz)   04/26/24 70.3 kg (155 lb)     -secondary to poor outpatient follow-up and medication non-adherence; per patient had MI about 10 years ago and no cardiology follow-up   -last echo per [facility] chart review appears to be in 2017 with EF 35% however unable to view echo  -per medication review today, not on any goal directed medical therapy  -suspected GORGE  -dry weight is around 70 kg  -Echo (11/22/2024):   Left Ventricle: Left ventricular cavity size is normal. Wall thickness is normal. The left ventricular ejection fraction is 27% by visual estimation. Systolic function is severely reduced. Global longitudinal strain is reduced at -9%. Strain shows some degree of apical sparing.  Can consider additional workup as clinically indicated. There is severe global hypokinesis with regional variation. Diastolic function is severely abnormal, consistent with ungraded restrictive relaxation.  Left atrial filling pressure is elevated.    Right Ventricle: Right ventricular cavity size is normal. Systolic function is low normal.    Left Atrium: The atrium is severely dilated.    Aortic Valve: There is trace regurgitation.    Mitral Valve: There is mild annular calcification. There is mild regurgitation.    Tricuspid Valve: There is mild to moderate regurgitation. Pulmonary artery systolic pressures are estimated at 59 mmHg.    There is no study for comparison.  -On room air SPO2 96% today    PLAN  -Cont lasix 20 mg QD  -Jardiance 10 mg QD started   -As per cardio to start Entresto tomorrow and monitor BP   -Sodium 2g restriction  -Fluid restriction 1800ml   -I/O  -Daily weights   -AM CBC, CMP  -Follow up with a sleep study as outpatient  -Follow up with PCP as outpatient

## 2024-11-23 NOTE — ASSESSMENT & PLAN NOTE
Lab Results   Component Value Date    HGBA1C 6.9 (H) 11/20/2024     -home medications: metformin 500mg BID  -has not used medication in two weeks because he ran out of medications  -Last glucose (CMP) 105    PLAN  -Hold metformin for now  -ISS, ACHS  -Diabetic diet  -AM CBC, CMP

## 2024-11-23 NOTE — ASSESSMENT & PLAN NOTE
Home medication: lisinopril 20mg     PLAN  -Monitor BP  -Cardiac consult appreciated  -As per cardio discontinued lisinopril for 48 hours starting 11/22/24, in order to be started on Entresto tomorrow 11/24/24

## 2024-11-23 NOTE — PLAN OF CARE
Problem: PAIN - ADULT  Goal: Verbalizes/displays adequate comfort level or baseline comfort level  Description: Interventions:  - Encourage patient to monitor pain and request assistance  - Assess pain using appropriate pain scale  - Administer analgesics based on type and severity of pain and evaluate response  - Implement non-pharmacological measures as appropriate and evaluate response  - Consider cultural and social influences on pain and pain management  - Notify physician/advanced practitioner if interventions unsuccessful or patient reports new pain  Outcome: Progressing     Problem: INFECTION - ADULT  Goal: Absence or prevention of progression during hospitalization  Description: INTERVENTIONS:  - Assess and monitor for signs and symptoms of infection  - Monitor lab/diagnostic results  - Monitor all insertion sites, i.e. indwelling lines, tubes, and drains  - Monitor endotracheal if appropriate and nasal secretions for changes in amount and color  - [place] appropriate cooling/warming therapies per order  - Administer medications as ordered  - Instruct and encourage patient and family to use good hand hygiene technique  - Identify and instruct in appropriate isolation precautions for identified infection/condition  Outcome: Progressing     Problem: DISCHARGE PLANNING  Goal: Discharge to home or other facility with appropriate resources  Description: INTERVENTIONS:  - Identify barriers to discharge w/patient and caregiver  - Arrange for needed discharge resources and transportation as appropriate  - Identify discharge learning needs (meds, wound care, etc.)  - Arrange for interpretive services to assist at discharge as needed  - Refer to Case Management Department for coordinating discharge planning if the patient needs post-hospital services based on physician/advanced practitioner order or complex needs related to functional status, cognitive ability, or social support system  Outcome: Progressing      Problem: Knowledge Deficit  Goal: Patient/family/caregiver demonstrates understanding of disease process, treatment plan, medications, and discharge instructions  Description: Complete learning assessment and assess knowledge base.  Interventions:  - Provide teaching at level of understanding  - Provide teaching via preferred learning methods  Outcome: Progressing     Problem: RESPIRATORY - ADULT  Goal: Achieves optimal ventilation and oxygenation  Description: INTERVENTIONS:  - Assess for changes in respiratory status  - Assess for changes in mentation and behavior  - Position to facilitate oxygenation and minimize respiratory effort  - Oxygen administered by appropriate delivery if ordered  - Initiate smoking cessation education as indicated  - Encourage broncho-pulmonary hygiene including cough, deep breathe, Incentive Spirometry  - Assess the need for suctioning and aspirate as needed  - Assess and instruct to report SOB or any respiratory difficulty  - Respiratory Therapy support as indicated  Outcome: Progressing

## 2024-11-23 NOTE — QUICK NOTE
Patient was seen during rounds with senior resident this evening and he is laying down in bed comfortably.  Denies shortness of breath, palpitation, cough, chest pain.  He states that he feels fine. Denies loss of appetite, abdominal pain or diarrhea.  No other complaints at this time.  The current management plan has been discussed with him and he is agreeable to it.  We will continue the current management plan.

## 2024-11-24 VITALS
OXYGEN SATURATION: 96 % | TEMPERATURE: 97.2 F | DIASTOLIC BLOOD PRESSURE: 64 MMHG | BODY MASS INDEX: 26.98 KG/M2 | RESPIRATION RATE: 18 BRPM | SYSTOLIC BLOOD PRESSURE: 101 MMHG | WEIGHT: 146.61 LBS | HEART RATE: 84 BPM | HEIGHT: 62 IN

## 2024-11-24 LAB
GLUCOSE SERPL-MCNC: 110 MG/DL (ref 65–140)
GLUCOSE SERPL-MCNC: 112 MG/DL (ref 65–140)

## 2024-11-24 PROCEDURE — 82948 REAGENT STRIP/BLOOD GLUCOSE: CPT

## 2024-11-24 PROCEDURE — 99238 HOSP IP/OBS DSCHRG MGMT 30/<: CPT | Performed by: FAMILY MEDICINE

## 2024-11-24 PROCEDURE — 99232 SBSQ HOSP IP/OBS MODERATE 35: CPT | Performed by: INTERNAL MEDICINE

## 2024-11-24 RX ORDER — NICOTINE 21 MG/24HR
1 PATCH, TRANSDERMAL 24 HOURS TRANSDERMAL EVERY 24 HOURS
Qty: 10 PATCH | Refills: 0 | Status: SHIPPED | OUTPATIENT
Start: 2024-11-24 | End: 2024-12-03 | Stop reason: SDUPTHER

## 2024-11-24 RX ORDER — FLUTICASONE PROPIONATE 50 MCG
1 SPRAY, SUSPENSION (ML) NASAL DAILY
Qty: 16 G | Refills: 0 | Status: SHIPPED | OUTPATIENT
Start: 2024-11-24 | End: 2024-12-03 | Stop reason: SDUPTHER

## 2024-11-24 RX ORDER — CARVEDILOL 6.25 MG/1
6.25 TABLET ORAL 2 TIMES DAILY WITH MEALS
Status: DISCONTINUED | OUTPATIENT
Start: 2024-11-24 | End: 2024-11-24 | Stop reason: HOSPADM

## 2024-11-24 RX ORDER — ASPIRIN 81 MG/1
81 TABLET ORAL DAILY
Status: CANCELLED | OUTPATIENT
Start: 2024-11-24

## 2024-11-24 RX ORDER — CARVEDILOL 6.25 MG/1
6.25 TABLET ORAL 2 TIMES DAILY WITH MEALS
Qty: 60 TABLET | Refills: 0 | Status: SHIPPED | OUTPATIENT
Start: 2024-11-24 | End: 2024-12-03 | Stop reason: SDUPTHER

## 2024-11-24 RX ORDER — ASPIRIN 81 MG/1
81 TABLET ORAL DAILY
Qty: 90 TABLET | Refills: 0 | Status: SHIPPED | OUTPATIENT
Start: 2024-11-24 | End: 2024-12-03 | Stop reason: SDUPTHER

## 2024-11-24 RX ADMIN — EMPAGLIFLOZIN 10 MG: 10 TABLET, FILM COATED ORAL at 08:55

## 2024-11-24 RX ADMIN — CARVEDILOL 3.12 MG: 3.12 TABLET, FILM COATED ORAL at 08:58

## 2024-11-24 RX ADMIN — SACUBITRIL AND VALSARTAN 1 TABLET: 49; 51 TABLET, FILM COATED ORAL at 08:55

## 2024-11-24 RX ADMIN — FUROSEMIDE 20 MG: 20 TABLET ORAL at 08:59

## 2024-11-24 RX ADMIN — ENOXAPARIN SODIUM 40 MG: 40 INJECTION SUBCUTANEOUS at 08:59

## 2024-11-24 RX ADMIN — ASPIRIN 81 MG: 81 TABLET, COATED ORAL at 08:57

## 2024-11-24 NOTE — ASSESSMENT & PLAN NOTE
Home medication: lisinopril 20mg     PLAN  -Lisinopril discontinued and he is started on Entresto 24-26 mg p.o. daily  -Low-sodium diet  -Follow-up with PCP

## 2024-11-24 NOTE — DISCHARGE INSTR - AVS FIRST PAGE
-Outpatient follow-up with cardiology within 3 days  -Follow up with PCP  - CMP lab works in 1 week

## 2024-11-24 NOTE — ASSESSMENT & PLAN NOTE
Lab Results   Component Value Date    HGBA1C 6.9 (H) 11/20/2024     Recent Labs     11/23/24  1058 11/23/24  1536 11/23/24 2025 11/24/24  0559   POCGLU 106 151* 133 110     Blood Sugar Average: Last 72 hrs:  (P) [id number]    Diabetes is well controlled. Defer management to primary team.

## 2024-11-24 NOTE — ASSESSMENT & PLAN NOTE
-5-6 cigarettes daily for about 38 years; quit 15 days ago    PLAN  -Nicotine patch  - on smoking cessation

## 2024-11-24 NOTE — ASSESSMENT & PLAN NOTE
Lab Results   Component Value Date    HGBA1C 6.9 (H) 11/20/2024     -home medications: metformin 500mg BID  -has not used medication in two weeks because he ran out of medications  -Last glucose (CMP) 105    PLAN  -Metformin discontinued and he was started on empagliflozin 10 mg p.o. daily  -Low-carb diet

## 2024-11-24 NOTE — ASSESSMENT & PLAN NOTE
Wt Readings from Last 3 Encounters:   11/23/24 67.9 kg (149 lb 11.1 oz)   11/13/24 71.3 kg (157 lb 3 oz)   04/26/24 70.3 kg (155 lb)     -secondary to poor outpatient follow-up and medication non-adherence; per patient had MI about 10 years ago and no cardiology follow-up   -last echo per [facility] chart review appears to be in 2017 with EF 35%  -per medication review, not on any goal directed medical therapy  -Echo (11/22/2024):  The left ventricular ejection fraction is 27% by visual estimation. Systolic function is severely reduced. There is severe global hypokinesis with regional variation. Diastolic function is severely abnormal, consistent with ungraded restrictive relaxation.  Left atrial filling pressure is elevated.    Left Atrium: The atrium is severely dilated.    Aortic Valve: There is trace regurgitation.    Mitral Valve: There is mild annular calcification. There is mild regurgitation.    Tricuspid Valve: There is mild to moderate regurgitation. Pulmonary artery systolic pressures are estimated at 59 mmHg.    There is no study for comparison.  -On room air SPO2 96% today    PLAN  -Continue Jardiance 10 mg p.o. daily  -Continue Entresto 24-26 mg p.o. daily   -Low-sodium diet  -Check CMP in a week  -Follow up with PCP as outpatient  -Follow-up with cardiology

## 2024-11-24 NOTE — PLAN OF CARE
Problem: PAIN - ADULT  Goal: Verbalizes/displays adequate comfort level or baseline comfort level  Description: Interventions:  - Encourage patient to monitor pain and request assistance  - Assess pain using appropriate pain scale  - Administer analgesics based on type and severity of pain and evaluate response  - Implement non-pharmacological measures as appropriate and evaluate response  - Consider cultural and social influences on pain and pain management  - Notify physician/advanced practitioner if interventions unsuccessful or patient reports new pain  11/24/2024 0806 by Tereza Carballo RN  Outcome: Progressing  11/24/2024 0800 by Tereza Carballo RN  Outcome: Progressing  11/24/2024 0733 by Tereza Carballo RN  Outcome: Progressing     Problem: DISCHARGE PLANNING  Goal: Discharge to home or other facility with appropriate resources  Description: INTERVENTIONS:  - Identify barriers to discharge w/patient and caregiver  - Arrange for needed discharge resources and transportation as appropriate  - Identify discharge learning needs (meds, wound care, etc.)  - Arrange for interpretive services to assist at discharge as needed  - Refer to Case Management Department for coordinating discharge planning if the patient needs post-hospital services based on physician/advanced practitioner order or complex needs related to functional status, cognitive ability, or social support system  11/24/2024 0806 by Tereza Carballo RN  Outcome: Progressing  11/24/2024 0800 by Tereza Carballo RN  Outcome: Progressing  11/24/2024 0733 by Tereza Carballo RN  Outcome: Progressing

## 2024-11-24 NOTE — PLAN OF CARE
Problem: Potential for Falls  Goal: Patient will remain free of falls  Description: INTERVENTIONS:  - Educate patient/family on patient safety including physical limitations  - Instruct patient to call for assistance with activity   - Keep Call bell within reach  - Keep bed low and locked with side rails adjusted as appropriate  - Keep care items and personal belongings within reach  - Initiate and maintain comfort rounds  - Apply yellow socks and bracelet for high fall risk patients    Outcome: Progressing     Problem: PAIN - ADULT  Goal: Verbalizes/displays adequate comfort level or baseline comfort level  Description: Interventions:  - Encourage patient to monitor pain and request assistance  - Assess pain using appropriate pain scale  - Administer analgesics based on type and severity of pain and evaluate response  - Implement non-pharmacological measures as appropriate and evaluate response  - Consider cultural and social influences on pain and pain management  - Notify physician/advanced practitioner if interventions unsuccessful or patient reports new pain  Outcome: Progressing     Problem: DISCHARGE PLANNING  Goal: Discharge to home or other facility with appropriate resources  Description: INTERVENTIONS:  - Identify barriers to discharge w/patient and caregiver  - Arrange for needed discharge resources and transportation as appropriate  - Identify discharge learning needs (meds, wound care, etc.)  - Arrange for interpretive services to assist at discharge as needed  - Refer to Case Management Department for coordinating discharge planning if the patient needs post-hospital services based on physician/advanced practitioner order or complex needs related to functional status, cognitive ability, or social support system  Outcome: Progressing     Problem: Knowledge Deficit  Goal: Patient/family/caregiver demonstrates understanding of disease process, treatment plan, medications, and discharge  instructions  Description: Complete learning assessment and assess knowledge base.  Interventions:  - Provide teaching at level of understanding  - Provide teaching via preferred learning methods  Outcome: Progressing     Problem: RESPIRATORY - ADULT  Goal: Achieves optimal ventilation and oxygenation  Description: INTERVENTIONS:  - Assess for changes in respiratory status  - Assess for changes in mentation and behavior  - Position to facilitate oxygenation and minimize respiratory effort  - Oxygen administered by appropriate delivery if ordered  - Initiate smoking cessation education as indicated  - Encourage broncho-pulmonary hygiene including cough, deep breathe, Incentive Spirometry  - Assess the need for suctioning and aspirate as needed  - Assess and instruct to report SOB or any respiratory difficulty  - Respiratory Therapy support as indicated  Outcome: Progressing

## 2024-11-24 NOTE — NURSING NOTE
Patient discharged to home, IV removed.  Patient given discharge instructions and explained to patient via  [employer] with stated understanding.  Patient left unit with belongings in stable condition.

## 2024-11-24 NOTE — PROGRESS NOTES
Progress Note - Cardiology   Name: Ruben Flores 69 y.o. male I MRN: [id number]  Unit/Bed#: 7T [facility] 712-01 I Date of Admission: 11/20/2024   Date of Service: 11/24/2024 I Hospital Day: 4     Assessment & Plan  Acute CHF (congestive heart failure) (HCC)  Wt Readings from Last 3 Encounters:   11/24/24 66.5 kg (146 lb 9.7 oz)   11/13/24 71.3 kg (157 lb 3 oz)   04/26/24 70.3 kg (155 lb)     - Patient admitted with sudden onset of shortness of breath and a chest x-ray which demonstrated cardiomegaly with acute pulmonary edema with bilateral pleural effusions.    - Patient was diuresed and placed on oxygen.  He is now on room air.   -Chest x-ray 7/23/2024 significant for persistent pulmonary edema and atelectasis versus pneumonia.   -Is currently on furosemide 20 mg p.o. daily.    - Echocardiogram 11/21/2024 demonstrated an ejection fraction of 27% although EF appears to be lower than that by visual assessment.  -Has history of nonischemic cardiomyopathy diagnosed several years back.  As per documentation had cardiac catheterization sometime in 2017 which did not reveal any obstructive CAD.    Current cardiac regimen includes furosemide 20 mg p.o. daily, Entresto 49-51 mg twice daily Jardiance 10 mg daily carvedilol 3.125 mg twice daily.    Blood pressure stable.    Plan:  -- Increasing the dose of carvedilol to 6.25 mg and lowering the dose of Entresto to 24/26 twice daily.  -- Continue current dose of Jardiance.  -- Check ambulatory heart rates as long as ambulatory heart rates are less than 110 with normal activity he can be discharged to home today.  -- Request outpatient follow-up with primary physician's office within 1 week.  Will try to arrange for cardiology office visit within 2 weeks.  -- Please provide patient's and family education about heart failure exacerbation prevention.  -- Should have follow-up blood work within 1 week to check renal function and electrolytes.+    Mixed hyperlipidemia  Continue  atorvastatin 40 mg daily    Primary hypertension  Patient needs better blood pressure control.  With an ejection fraction in the 20s of goal of 140/90 is not adequate.  The goal should be for systolic less than 130 and less than 120 is actually better if he is not symptomatically lightheaded  We will hold off at present increasing antihypertensives because if Entresto is added, that will lower his blood pressure.  In addition if he does not reach blood pressure goals on Entresto 49/51 he can be increased to Entresto 97/103    Tobacco user  Current everyday smoker started in 2024  Encourage patient to stop smoking    Type 2 diabetes mellitus without complication, without long-term current use of insulin ([facility])  Lab Results   Component Value Date    HGBA1C 6.9 (H) 11/20/2024     Recent Labs     11/23/24  1058 11/23/24  1536 11/23/24 2025 11/24/24  0559   POCGLU 106 151* 133 110     Blood Sugar Average: Last 72 hrs:  (P) [id number]    Diabetes is well controlled. Defer management to primary team.      Subjective   Chief Complaint: No acute events.  Patient reports feeling well.  Denies chest pain shortness of breath or dizziness.  Denies palpitations.    Objective :  Temp:  [96.8 °F (36 °C)-97.4 °F (36.3 °C)] 96.8 °F (36 °C)  HR:  [77-88] 77  BP: (108-121)/(73-89) 118/79  Resp:  [18] 18  SpO2:  [94 %-100 %] 94 %  O2 Device: None (Room air)  Orthostatic Blood Pressures      Flowsheet Row Most Recent Value   Blood Pressure 118/79 filed at 11/24/2024 0737   Patient Position - Orthostatic VS Lying filed at 11/24/2024 0737          First Weight: Weight - Scale: 73.2 kg (161 lb 6 oz) (11/20/24 0828)  Vitals:    11/23/24 0718 11/24/24 0600   Weight: 67.9 kg (149 lb 11.1 oz) 66.5 kg (146 lb 9.7 oz)     Physical Exam  Constitutional:       General: He is not in acute distress.     Appearance: He is well-developed and normal weight. He is not toxic-appearing.   HENT:      Head: Normocephalic.   Cardiovascular:       Rate and Rhythm: Normal rate and regular rhythm.      Pulses: Normal pulses.   Pulmonary:      Effort: Pulmonary effort is normal. No tachypnea.      Breath sounds: Examination of the right-lower field reveals decreased breath sounds. Examination of the left-lower field reveals decreased breath sounds. Decreased breath sounds present. No wheezing, rhonchi or rales.   Chest:      Chest wall: No deformity or tenderness.   Abdominal:      Palpations: Abdomen is soft.   Musculoskeletal:      Right lower leg: No edema.      Left lower leg: No edema.   Skin:     General: Skin is warm and dry.   Neurological:      General: No focal deficit present.      Mental Status: He is alert and oriented to person, place, and time.       Lab Results: I have reviewed the following results:CBC/BMP: No new results in last 24 hours.   Results from last 7 days   Lab Units 11/21/24  0449 11/20/24  0834   WBC Thousand/uL 8.84 8.70   HEMOGLOBIN g/dL 12.4 13.2   HEMATOCRIT % 37.7 40.2   PLATELETS Thousands/uL 322 334     Results from last 7 days   Lab Units 11/23/24  0459 11/21/24  0449 11/20/24  0834   POTASSIUM mmol/L 4.1 3.5 3.9   CHLORIDE mmol/L 101 103 102   CO2 mmol/L 26 29 29   BUN mg/dL 12 11 7   CREATININE mg/dL 0.82 0.75 0.81   CALCIUM mg/dL 8.9 8.7 9.0         Lab Results   Component Value Date    HGBA1C 6.9 (H) 11/20/2024     Lab Results   Component Value Date    TROPONINI <0.01 12/02/2019       Imaging Results Review: I personally reviewed the following image studies/reports in PACS and discussed pertinent findings with Radiology: chest xray. My interpretation of the radiology images/reports is: Interstitial prominenece consistent with pulm edema.      VTE Pharmacologic Prophylaxis: Sequential compression device (Venodyne)   VTE Mechanical Prophylaxis: sequential compression device

## 2024-11-24 NOTE — DISCHARGE SUMMARY
Discharge Summary - [facility]    Patient Information: Ruben Flores 69 y.o. male MRN: [id number]  Unit/Bed#: 7T [facility] 712-01 Encounter: [id number]    Discharging Physician / Practitioner: Andrea Quintero MD  PCP: Yanira Dickey MD  Admission Date:   Admission Orders (From admission, onward)       Ordered        11/20/24 1632  INPATIENT ADMISSION  Once                          Discharge Date: 11/24/24    Reason for Admission: acute exacerbation of CHF    Discharge Diagnoses:     Principal Problem:    Acute CHF (congestive heart failure) (HCC)  Active Problems:    Primary hypertension    Mixed hyperlipidemia    Tobacco user    Type 2 diabetes mellitus without complication, without long-term current use of insulin ([facility])  Resolved Problems:    * No resolved hospital problems. *        Type 2 diabetes mellitus without complication, without long-term current use of insulin ([facility])  Assessment & Plan  Lab Results   Component Value Date    HGBA1C 6.9 (H) 11/20/2024     -home medications: metformin 500mg BID  -has not used medication in two weeks because he ran out of medications  -Last glucose (CMP) 105    PLAN  -Metformin discontinued and he was started on empagliflozin 10 mg p.o. daily  -Low-carb diet      Tobacco user  Assessment & Plan  -5-6 cigarettes daily for about 38 years; quit 15 days ago    PLAN  -Nicotine patch  - on smoking cessation    Mixed hyperlipidemia  Assessment & Plan  -home medication: lipitor 40mg     PLAN  -continue home medication    Primary hypertension  Assessment & Plan  Home medication: lisinopril 20mg     PLAN  -Lisinopril discontinued and he is started on Entresto 24-26 mg p.o. daily  -Low-sodium diet  -Follow-up with PCP      * Acute CHF (congestive heart failure) (HCC)  Assessment & Plan  Wt Readings from Last 3 Encounters:   11/23/24 67.9 kg (149 lb 11.1 oz)   11/13/24 71.3 kg (157 lb 3 oz)   04/26/24 70.3 kg (155 lb)     -secondary to poor outpatient  follow-up and medication non-adherence; per patient had MI about 10 years ago and no cardiology follow-up   -last echo per [facility] chart review appears to be in 2017 with EF 35%  -per medication review, not on any goal directed medical therapy  -Echo (11/22/2024):  The left ventricular ejection fraction is 27% by visual estimation. Systolic function is severely reduced. There is severe global hypokinesis with regional variation. Diastolic function is severely abnormal, consistent with ungraded restrictive relaxation.  Left atrial filling pressure is elevated.    Left Atrium: The atrium is severely dilated.    Aortic Valve: There is trace regurgitation.    Mitral Valve: There is mild annular calcification. There is mild regurgitation.    Tricuspid Valve: There is mild to moderate regurgitation. Pulmonary artery systolic pressures are estimated at 59 mmHg.    There is no study for comparison.  -On room air SPO2 96% today    PLAN  -Continue Jardiance 10 mg p.o. daily  -Continue Entresto 24-26 mg p.o. daily   -Low-sodium diet  -Check CMP in a week  -Follow up with PCP as outpatient  -Follow-up with cardiology         Consultations During Hospital Stay:  Cardiology    Procedures Performed:   None    Significant Findings / Test Results:   None    Incidental Findings:   None     Test Results Pending at Discharge (will require follow up):   None     Outpatient Tests Requested:  None    Outpatient follow-up Requested:  PCP  Cardiology    Complications:  None    Hospital Course:     Ruben Flores is a 69 y.o. male patient with PMH of HTN, DM & HFrEF (EF of 35% in 2017) who originally presented to the hospital on 11/20/2024 due to  shortness of breath for one night, whereby he was awoken from his sleep because he felt like he couldnt breathe. He denied any associated chest pain, fever or swelling of his lower limbs. He does report coughing about 4 days ago but it self resolved. For his underlying DM & HTN, he had reported  not taking his metformin for the past 15 days and his lisinopril for the past week, as he ran out of his medications. He denied any substance use.      Last week he came to the ED complaining of chest pain and cough and was diagnosed with pneumonia. He was discharged with Augmentin (a week course) and  azithromycin (4 day course).     ED COURSE  Vitals /94, , RR 40  T 97.7 SPO2 94%  Labs CMP glucose 168, Trop 0hr 13, , D-Dimer 0.96, otherwise CBC, UA, Procal COVID/FLU negative are WNL  Imaging  CXR & CTA chest : Constellation of findings suggesting volume overload with bilateral moderate pleural effusions, pulmonary vascular congestion, and interstitial and alveolar edema. No pulmonary emboli.   Meds/Interventions given was:   furosemide (LASIX) injection 20 mg  hydrocortisone (Solu-CORTEF) injection 200 mg  diphenhydrAMINE (BENADRYL) injection 50 mg     We admitted him to  service due to acute exacerbation of CHF    During his hospital admission, his SOB improved after  day 2 of receiving 20 mg of IV lasix and his lungs were clear to auscultation. Lasix 20 mg PO was continued throughouyt his admission. He was also initially put on 2 L of oxygen on admission and later weaned off of oxygen by day 2 to breathing on room air with SPO2 between 95%-100% .   He was seen by Cardiology, an echo was ordered and he was found to have an EF of 27% as compared to his last echo done in 2017 with EF of 36% . Apparently he had not been aware of his preexisting heart condition thus was not on any heart failure medications. As per cardio recommendation, his lisinopril was discontinued to start entresto after 48 hours wash period.  Also his metformin has been discontinued and started on empagliflozin 10 mg p.o. daily.    On the day of discharge per cardiologist recommendation his Entresto dosage has been decreased to(24/26 mg), his Coreg dosage has been increased to 6.25 mg.  He denies shortness of breath, cough,  "chest pain, palpitation or leg swelling. He was also informed to follow up with cardio 3 days after discharge for possibilities of being on a Lifevest.       Review of Systems   Constitutional:  Negative for chills and fever.   HENT:  Negative for ear pain and sore throat.    Eyes:  Negative for pain and visual disturbance.   Respiratory:  Negative for cough and shortness of breath.    Cardiovascular:  Negative for chest pain and palpitations.   Gastrointestinal:  Negative for abdominal pain and vomiting.   Genitourinary:  Negative for dysuria and hematuria.   Musculoskeletal:  Negative for arthralgias and back pain.   Skin:  Negative for color change and rash.   Neurological:  Negative for seizures and syncope.   All other systems reviewed and are negative.      Condition at Discharge: stable     Discharge Day Visit / Exam:     Vitals: Blood Pressure: 101/64 (11/24/24 1100)  Pulse: 84 (11/24/24 1100)  Temperature: (!) 97.2 °F (36.2 °C) (11/24/24 1100)  Temp Source: Temporal (11/24/24 1100)  Respirations: 18 (11/24/24 1100)  Height: 5' 2\" (157.5 cm) (11/21/24 1210)  Weight - Scale: 66.5 kg (146 lb 9.7 oz) (11/24/24 0600)  SpO2: 96 % (11/24/24 1100)  Exam:   Physical Exam  Vitals reviewed.   Constitutional:       General: He is not in acute distress.     Appearance: Normal appearance.   HENT:      Head: Normocephalic.      Right Ear: External ear normal.      Left Ear: External ear normal.      Nose: Nose normal.      Mouth/Throat:      Pharynx: Oropharynx is clear.   Eyes:      Extraocular Movements: Extraocular movements intact.      Conjunctiva/sclera: Conjunctivae normal.   Cardiovascular:      Rate and Rhythm: Normal rate and regular rhythm.      Pulses: Normal pulses.      Heart sounds: Normal heart sounds. No murmur heard.     No friction rub. No gallop.   Pulmonary:      Effort: Pulmonary effort is normal.      Breath sounds: Normal breath sounds. No wheezing.   Abdominal:      General: Bowel sounds are " normal.      Palpations: There is no mass.      Tenderness: There is no abdominal tenderness.   Musculoskeletal:         General: No swelling.      Cervical back: Normal range of motion.      Right lower leg: No edema.      Left lower leg: No edema.   Skin:     General: Skin is warm.      Capillary Refill: Capillary refill takes less than 2 seconds.   Neurological:      General: No focal deficit present.      Mental Status: He is alert and oriented to person, place, and time. Mental status is at baseline.      Sensory: No sensory deficit.      Motor: No weakness.   Psychiatric:         Mood and Affect: Mood normal.         Discussion with Family: Yes  Patient Information Sharing: With the consent of Ruben Flores , their loved ones (his wife and daughter) were notified today by inpatient team of the patient’s condition and current plan.  All questions answered.     Discharge instructions/Information to patient and family:   See after visit summary for information provided to patient and family.      Discharge Medications:  Aspirin 81 mg Oral Daily    Atorvastatin Calcium 40 mg Oral Daily at bedtime    Carvedilol 6.25 mg Oral 2 times daily with meals    Empagliflozin 10 mg Oral Every morning    Fluticasone Propionate 50 mcg/act 1 spray Nasal Daily    Melatonin 3 mg Oral Daily at bedtime    Nicotine 21 mg/24 hr 1 patch Transdermal Every 24 hours    Sacubitril-Valsartan 24-26 MG 1 tablet Oral 2 times daily       Provisions for Follow-Up Care:  See after visit summary for information related to follow-up care and any pertinent home health orders.      Disposition:     Home    For Discharges to [facility]F:   Not Applicable to this Patient - Not Applicable to this Patient    Planned Readmission: no     Discharge Statement:  I spent 60 minutes discharging the patient. This time was spent on the day of discharge. I had direct contact with the patient on the day of discharge. Greater than 50% of the  total time was spent examining patient, answering all patient questions, arranging and discussing plan of care with patient as well as directly providing post-discharge instructions.  Additional time then spent on discharge activities.        Frederic Danielle MD  11/24/24  2:26 PM

## 2024-11-24 NOTE — PLAN OF CARE
Problem: PAIN - ADULT  Goal: Verbalizes/displays adequate comfort level or baseline comfort level  Description: Interventions:  - Encourage patient to monitor pain and request assistance  - Assess pain using appropriate pain scale  - Administer analgesics based on type and severity of pain and evaluate response  - Implement non-pharmacological measures as appropriate and evaluate response  - Consider cultural and social influences on pain and pain management  - Notify physician/advanced practitioner if interventions unsuccessful or patient reports new pain  11/24/2024 0800 by Tereza Carballo RN  Outcome: Progressing  11/24/2024 0733 by Tereza Carballo RN  Outcome: Progressing     Problem: Knowledge Deficit  Goal: Patient/family/caregiver demonstrates understanding of disease process, treatment plan, medications, and discharge instructions  Description: Complete learning assessment and assess knowledge base.  Interventions:  - Provide teaching at level of understanding  - Provide teaching via preferred learning methods  11/24/2024 0800 by Tereza Carballo RN  Outcome: Progressing  11/24/2024 0733 by Tereza Carballo RN  Outcome: Progressing

## 2024-11-24 NOTE — ASSESSMENT & PLAN NOTE
Wt Readings from Last 3 Encounters:   11/24/24 66.5 kg (146 lb 9.7 oz)   11/13/24 71.3 kg (157 lb 3 oz)   04/26/24 70.3 kg (155 lb)     - Patient admitted with sudden onset of shortness of breath and a chest x-ray which demonstrated cardiomegaly with acute pulmonary edema with bilateral pleural effusions.    - Patient was diuresed and placed on oxygen.  He is now on room air.   -Chest x-ray 7/23/2024 significant for persistent pulmonary edema and atelectasis versus pneumonia.   -Is currently on furosemide 20 mg p.o. daily.    - Echocardiogram 11/21/2024 demonstrated an ejection fraction of 27% although EF appears to be lower than that by visual assessment.  -Has history of nonischemic cardiomyopathy diagnosed several years back.  As per documentation had cardiac catheterization sometime in 2017 which did not reveal any obstructive CAD.    Current cardiac regimen includes furosemide 20 mg p.o. daily, Entresto 49-51 mg twice daily Jardiance 10 mg daily carvedilol 3.125 mg twice daily.    Blood pressure stable.    Plan:  -- Increasing the dose of carvedilol to 6.25 mg and lowering the dose of Entresto to 24/26 twice daily.  -- Continue current dose of Jardiance.  -- Check ambulatory heart rates as long as ambulatory heart rates are less than 110 with normal activity he can be discharged to home today.  -- Request outpatient follow-up with primary physician's office within 1 week.  Will try to arrange for cardiology office visit within 2 weeks.  -- Please provide patient's and family education about heart failure exacerbation prevention.  -- Should have follow-up blood work within 1 week to check renal function and electrolytes.+

## 2024-11-24 NOTE — QUICK NOTE
Tried contacting patient's daughter (Samantha Adame) to update her on father's discharge today but no answer.  Voicemail left.  Will try later on today.

## 2024-11-24 NOTE — QUICK NOTE
Spoke with patient's daughter.  Updated her on father's discharge today.  States will call her uncle to  patient. Medical management/plan discussed during phone conversation.  All questions answered.

## 2024-11-25 ENCOUNTER — OFFICE VISIT (OUTPATIENT)
Dept: CARDIOLOGY CLINIC | Facility: CLINIC | Age: 69
End: 2024-11-25
Payer: COMMERCIAL

## 2024-11-25 ENCOUNTER — TELEPHONE (OUTPATIENT)
Dept: FAMILY MEDICINE CLINIC | Facility: CLINIC | Age: 69
End: 2024-11-25

## 2024-11-25 ENCOUNTER — TRANSITIONAL CARE MANAGEMENT (OUTPATIENT)
Dept: FAMILY MEDICINE CLINIC | Facility: CLINIC | Age: 69
End: 2024-11-25

## 2024-11-25 ENCOUNTER — TELEPHONE (OUTPATIENT)
Dept: CARDIOLOGY CLINIC | Facility: CLINIC | Age: 69
End: 2024-11-25

## 2024-11-25 ENCOUNTER — APPOINTMENT (OUTPATIENT)
Dept: LAB | Facility: HOSPITAL | Age: 69
End: 2024-11-25
Payer: COMMERCIAL

## 2024-11-25 VITALS
OXYGEN SATURATION: 97 % | HEART RATE: 88 BPM | SYSTOLIC BLOOD PRESSURE: 124 MMHG | DIASTOLIC BLOOD PRESSURE: 82 MMHG | HEIGHT: 62 IN | WEIGHT: 146 LBS | BODY MASS INDEX: 26.87 KG/M2

## 2024-11-25 DIAGNOSIS — E11.9 TYPE 2 DIABETES MELLITUS WITHOUT COMPLICATION, WITHOUT LONG-TERM CURRENT USE OF INSULIN (HCC): ICD-10-CM

## 2024-11-25 DIAGNOSIS — Z72.0 TOBACCO USER: ICD-10-CM

## 2024-11-25 DIAGNOSIS — I50.42 CHRONIC COMBINED SYSTOLIC AND DIASTOLIC CONGESTIVE HEART FAILURE (HCC): Primary | ICD-10-CM

## 2024-11-25 DIAGNOSIS — I50.9 ACUTE CHF (CONGESTIVE HEART FAILURE) (HCC): ICD-10-CM

## 2024-11-25 DIAGNOSIS — E78.2 MIXED HYPERLIPIDEMIA: ICD-10-CM

## 2024-11-25 DIAGNOSIS — I10 PRIMARY HYPERTENSION: ICD-10-CM

## 2024-11-25 LAB
ALBUMIN SERPL BCG-MCNC: 4.2 G/DL (ref 3.5–5)
ALP SERPL-CCNC: 83 U/L (ref 34–104)
ALT SERPL W P-5'-P-CCNC: 23 U/L (ref 7–52)
ANION GAP SERPL CALCULATED.3IONS-SCNC: 10 MMOL/L (ref 4–13)
AST SERPL W P-5'-P-CCNC: 25 U/L (ref 13–39)
BILIRUB SERPL-MCNC: 0.69 MG/DL (ref 0.2–1)
BUN SERPL-MCNC: 22 MG/DL (ref 5–25)
CALCIUM SERPL-MCNC: 9.6 MG/DL (ref 8.4–10.2)
CHLORIDE SERPL-SCNC: 98 MMOL/L (ref 96–108)
CO2 SERPL-SCNC: 26 MMOL/L (ref 21–32)
CREAT SERPL-MCNC: 1.3 MG/DL (ref 0.6–1.3)
GFR SERPL CREATININE-BSD FRML MDRD: 56 ML/MIN/1.73SQ M
GLUCOSE P FAST SERPL-MCNC: 243 MG/DL (ref 65–99)
POTASSIUM SERPL-SCNC: 5 MMOL/L (ref 3.5–5.3)
PROT SERPL-MCNC: 8 G/DL (ref 6.4–8.4)
SODIUM SERPL-SCNC: 134 MMOL/L (ref 135–147)

## 2024-11-25 PROCEDURE — 36415 COLL VENOUS BLD VENIPUNCTURE: CPT

## 2024-11-25 PROCEDURE — 99214 OFFICE O/P EST MOD 30 MIN: CPT | Performed by: INTERNAL MEDICINE

## 2024-11-25 PROCEDURE — 80053 COMPREHEN METABOLIC PANEL: CPT

## 2024-11-25 NOTE — TELEPHONE ENCOUNTER
Initial follow up:  Patient had visit with Dr Garrison today    DC weight 146 lbs 9.7 oz  OV weight 146 lbs

## 2024-11-25 NOTE — ASSESSMENT & PLAN NOTE
Wt Readings from Last 3 Encounters:   11/24/24 66.5 kg (146 lb 9.7 oz)   11/13/24 71.3 kg (157 lb 3 oz)   04/26/24 70.3 kg (155 lb)     11/21/2024 normal LV cavity size.  Severely reduced systolic function, EF 27% and GLS -9%.  Strain shows some degree of apical sparing.  May consider additional workup as clinically indicated.  Severe global hypokinesis and diastolic function is severely abnormal consistent with ungraded restrictive relaxation LAP is elevated RV systolic function low normal.  Severe LAE.  Trace AR.  Mild MAC and mild MR.  Mild to moderate TR with PASP 59 mmHg    Aspirin 81 mg  Carvedilol 6.25 mg 2 times daily  Jardiance 10 mg every morning  Entresto 24-26 2 times daily

## 2024-11-25 NOTE — ASSESSMENT & PLAN NOTE
4/26/2024 118/74  11/13/2024 148/93  11/20/2024 101/64    Carvedilol 6.25 mg 2 times daily  Entresto 24/26 2 times daily

## 2024-11-25 NOTE — PROGRESS NOTES
[facility]  [address] [place] [address]  Phone#  [phone]   Fax#  [phone]  *-*-*-*-*-*-*-*-*-*-*-*-*-*-*-*-*-*-*-*-*-*-*-*-*-*-*-*-*-*-*-*-*-*-*-*-*-*-*-*-*-*-*-*-*-*-*-*-*-*-*-*-*-*                                   Cardiology Follow Up      ENCOUNTER DATE: 24 3:18 PM  PATIENT NAME: Ruben Flores   : 1955    MRN: [id number]  AGE:69 y.o.      SEX: male  ENCOUNTER PROVIDER:Maikel Garrison MD     PRIMARY CARE PHYSICIAN: Yanira Dickey MD    CARDIOLOGY SPECIALTY COMMENTS  -2024 [facility] with acute congestive heart failure.  Other problems include primary hypertension, mixed hyperlipidemia, tobacco user, type 2 diabetes mellitus without complication, without long-term current use of insulin    2024 normal LV cavity size.  Severely reduced systolic function, EF 27% and GLS -9%.  Strain shows some degree of apical sparing.  May consider additional workup as clinically indicated.  Severe global hypokinesis and diastolic function is severely abnormal consistent with ungraded restrictive relaxation LAP is elevated RV systolic function low normal.  Severe LAE.  Trace AR.  Mild MAC and mild MR.  Mild to moderate TR with PASP 59 mmHg    ACTIVE PROBLEM LIST  Patient Active Problem List   Diagnosis    Primary hypertension    Mixed hyperlipidemia    Systolic heart failure (HCC)    Tobacco user    Type 2 diabetes mellitus without complication, without long-term current use of insulin (HCC)    Screening for AAA (abdominal aortic aneurysm)    Callus of foot    Seasonal allergies    Screening for AAA (aortic abdominal aneurysm)    Chronic combined systolic and diastolic congestive heart failure (HCC)       ACTIVE DIAGNOSIS AND PLAN    1. Chronic combined systolic and diastolic congestive heart failure (HCC)  Assessment & Plan:  Wt Readings from Last 3 Encounters:   24 66.5 kg (146 lb 9.7 oz)   24 71.3 kg (157 lb 3 oz)   24  70.3 kg (155 lb)     11/21/2024 normal LV cavity size.  Severely reduced systolic function, EF 27% and GLS -9%.  Strain shows some degree of apical sparing.  May consider additional workup as clinically indicated.  Severe global hypokinesis and diastolic function is severely abnormal consistent with ungraded restrictive relaxation LAP is elevated RV systolic function low normal.  Severe LAE.  Trace AR.  Mild MAC and mild MR.  Mild to moderate TR with PASP 59 mmHg    Aspirin 81 mg  Carvedilol 6.25 mg 2 times daily  Jardiance 10 mg every morning  Entresto 24-26 2 times daily  Orders:  -     Basic metabolic panel; Future; Expected date: 12/06/2024  2. Mixed hyperlipidemia  Assessment & Plan:  Atorvastatin 40 mg daily  3. Primary hypertension  Assessment & Plan:  4/26/2024 118/74  11/13/2024 148/93  11/20/2024 101/64    Carvedilol 6.25 mg 2 times daily  Entresto 24/26 2 times daily  4. Type 2 diabetes mellitus without complication, without long-term current use of insulin (HCC)  Assessment & Plan:    Lab Results   Component Value Date    HGBA1C 6.9 (H) 11/20/2024     Defer management to PCP  5. Tobacco user     INTERVAL HISTORY:        Patient who was just in the hospital at  with congestive heart failure and a new dilated cardiomyopathy.  His ejection fraction was between 20 and 30% with a moderately dilated LV.  There was some consideration for a LifeVest but there was concern in the communication that he would not understand how to use it.  In addition the residents were to arrange a meeting with his friend and his friend's wife who he lives with to see whether they could be supportive of him.  Both his friend and his friend's wife work jobs full-time and are not home during the day.  I gave him a map for the office is and he drove to his office appointment today so it does show some degree of compliance which is good for him.  However there is some communication barrier since he only speaks [place]ese.    He weighed  himself when he got home and he was 143 but he has not weighed himself since.  On examination he appears euvolemic.  He was discharged off diuretics because of a drop in his GFR from 90 to 56.  I did not resume them today    He denies shortness of breath.  He lungs are clear and he has no leg edema.  His blood pressure is 124/82.    His last lab test which was gotten this morning demonstrated a potassium of 5.0.  I told him not to have any fruit or drink any fruit juice between now and his next visit.  He said he does not usually eat a lot of fruit but he does drink fruit juice.    DISCUSSION/PLAN:          Weigh himself every day and if he gains 3 pounds in 1 day he is to give us a call  Do not eat any fruit or drink any fruit juice between now and next appointment  The plan is to repeat an echocardiogram in 3 months and if his LV has not improved, schedule him for an ICD  Nonfasting BMP a day or 2 prior to his return visit  Return in 2 weeks to see either a LAURY or myself  EKG on return    No results found for this visit on 11/25/24.      Lab Studies:    Lab Results   Component Value Date    CHOLESTEROL 176 01/30/2024    CHOLESTEROL 215 (H) 05/20/2021    CHOLESTEROL 147 01/11/2019     Lab Results   Component Value Date    TRIG 125 01/30/2024    TRIG 205 (H) 05/20/2021    TRIG 142 01/11/2019     Lab Results   Component Value Date    HDL 59 01/30/2024    HDL 42 05/20/2021    HDL 37 (L) 01/11/2019     Lab Results   Component Value Date    LDLCALC 92 01/30/2024    LDLCALC 132 (H) 05/20/2021    LDLCALC 82 01/11/2019       Lab Results   Component Value Date    HGBA1C 6.9 (H) 11/20/2024    HGBA1C 6.8 (A) 04/26/2024    HGBA1C 7.6 (A) 11/13/2023      Lab Results   Component Value Date    EGFR 56 11/25/2024    EGFR 90 11/23/2024    EGFR 94 11/21/2024    SODIUM 134 (L) 11/25/2024    SODIUM 136 11/23/2024    SODIUM 140 11/21/2024    K 5.0 11/25/2024    K 4.1 11/23/2024    K 3.5 11/21/2024    CL 98 11/25/2024      11/23/2024     11/21/2024    CO2 26 11/25/2024    CO2 26 11/23/2024    CO2 29 11/21/2024    BUN 22 11/25/2024    BUN 12 11/23/2024    BUN 11 11/21/2024    CREATININE 1.30 11/25/2024    CREATININE 0.82 11/23/2024    CREATININE 0.75 11/21/2024    MG 2.3 11/23/2024    MG 1.8 (L) 11/22/2024    MG 1.8 (L) 11/21/2024     Lab Results   Component Value Date    WBC 8.84 11/21/2024    WBC 8.70 11/20/2024    WBC 8.65 11/13/2024    HGB 12.4 11/21/2024    HGB 13.2 11/20/2024    HGB 13.8 11/13/2024    HCT 37.7 11/21/2024    HCT 40.2 11/20/2024    HCT 42.8 11/13/2024    MCV 90 11/21/2024    MCV 92 11/20/2024    MCV 94 11/13/2024    MCH 29.7 11/21/2024    MCH 30.2 11/20/2024    MCH 30.3 11/13/2024    MCHC 32.9 11/21/2024    MCHC 32.8 11/20/2024    MCHC 32.2 11/13/2024     11/21/2024     11/20/2024     11/13/2024      Lab Results   Component Value Date    CALCIUM 9.6 11/25/2024    CALCIUM 8.9 11/23/2024    CALCIUM 8.7 11/21/2024    ALB 4.2 11/25/2024    ALB 3.8 11/23/2024    ALB 3.4 (L) 11/21/2024    TP 8.0 11/25/2024    TP 7.3 11/23/2024    TP 6.7 11/21/2024    AST 25 11/25/2024    AST 15 11/23/2024    AST 13 11/21/2024    ALT 23 11/25/2024    ALT 12 11/23/2024    ALT 10 11/21/2024    ALKPHOS 83 11/25/2024    ALKPHOS 80 11/23/2024    ALKPHOS 76 11/21/2024       Lab Results   Component Value Date    DDIMER 0.96 (H) 11/20/2024     (H) 11/20/2024     (H) 11/13/2024    NTBNP 79.0 12/02/2019     Lab Results   Component Value Date    DDIMER 0.96 (H) 11/20/2024         Current Outpatient Medications:     aspirin (Aspirin Low Dose) 81 mg EC tablet, Take 1 tablet (81 mg total) by mouth daily, Disp: 90 tablet, Rfl: 0    atorvastatin (LIPITOR) 40 mg tablet, Take 1 tablet (40 mg total) by mouth daily at bedtime, Disp: 90 tablet, Rfl: 1    carvedilol (COREG) 6.25 mg tablet, Take 1 tablet (6.25 mg total) by mouth 2 (two) times a day with meals, Disp: 60 tablet, Rfl: 0    Empagliflozin (Jardiance) 10 MG  TABS tablet, Take 1 tablet (10 mg total) by mouth every morning, Disp: 30 tablet, Rfl: 1    fluticasone (FLONASE) 50 mcg/act nasal spray, 1 spray into each nostril daily, Disp: 16 g, Rfl: 0    melatonin 3 mg, Take 1 tablet (3 mg total) by mouth daily at bedtime, Disp: 15 tablet, Rfl: 0    nicotine (NICODERM CQ) 21 mg/24 hr TD 24 hr patch, Place 1 patch on the skin over 24 hours every 24 hours, Disp: 10 patch, Rfl: 0    sacubitril-valsartan (ENTRESTO) 24-26 MG TABS, Take 1 tablet by mouth 2 (two) times a day, Disp: 60 tablet, Rfl: 1  No current facility-administered medications for this visit.  Allergies   Allergen Reactions    Pollen Extract Itching    Ioversol Rash       Past Medical History:   Diagnosis Date    HLD (hyperlipidemia)     Hypertension      Social History     Socioeconomic History    Marital status: /Civil Union     Spouse name: Not on file    Number of children: Not on file    Years of education: Not on file    Highest education level: Not on file   Occupational History    Not on file   Tobacco Use    Smoking status: Every Day     Types: Cigarettes     Start date: 2024     Passive exposure: Current    Smokeless tobacco: Former   Vaping Use    Vaping status: Some Days    Substances: Flavoring   Substance and Sexual Activity    Alcohol use: Not Currently    Drug use: Not Currently     Comment: past use of Cocaine    Sexual activity: Not on file   Other Topics Concern    Not on file   Social History Narrative    Not on file     Social Drivers of Health     Financial Resource Strain: Medium Risk (4/26/2024)    Overall Financial Resource Strain (CARDIA)     Difficulty of Paying Living Expenses: Somewhat hard   Food Insecurity: No Food Insecurity (11/20/2024)    Nursing - Inadequate Food Risk Classification     Worried About Running Out of Food in the Last Year: Never true     Ran Out of Food in the Last Year: Never true     Ran Out of Food in the Last Year: 1   Transportation Needs: No  "Transportation Needs (2024)    Nursing - Transportation Risk Classification     Lack of Transportation: Not on file     Lack of Transportation: 2   Physical Activity: Not on file   Stress: Not on file   Social Connections: Not on file   Intimate Partner Violence: Unknown (2024)    Nursing IPS     Feels Physically and Emotionally Safe: Not on file     Physically Hurt by Someone: Not on file     Humiliated or Emotionally Abused by Someone: Not on file     Physically Hurt by Someone: 2     Hurt or Threatened by Someone: 2   Housing Stability: Unknown (2024)    Nursing: Inadequate Housing Risk Classification     Has Housing: Not on file     Worried About Losing Housing: Not on file     Unable to Get Utilities: Not on file     Unable to Pay for Housing in the Last Year: 2     Has Housin      No family history on file.  No past surgical history on file.    PREVIOUS WEIGHTS:   Wt Readings from Last 10 Encounters:   24 66.2 kg (146 lb)   24 66.5 kg (146 lb 9.7 oz)   24 71.3 kg (157 lb 3 oz)   24 70.3 kg (155 lb)   24 72.6 kg (160 lb)   23 72.8 kg (160 lb 6.4 oz)   23 72.1 kg (159 lb)   23 69.9 kg (154 lb)   22 70.3 kg (155 lb)   21 68.9 kg (152 lb)        Review of Systems:  Review of Systems   Constitutional:  Negative for activity change.   Respiratory:  Negative for cough, chest tightness, shortness of breath and wheezing.    Cardiovascular:  Negative for chest pain, palpitations and leg swelling.   Musculoskeletal:  Negative for gait problem.   Skin:  Negative for color change.   Neurological:  Negative for dizziness, tremors, syncope, weakness, light-headedness and headaches.   Psychiatric/Behavioral:  Negative for agitation and confusion.        Physical Exam:  /82   Pulse 88   Ht 5' 2\" (1.575 m)   Wt 66.2 kg (146 lb)   SpO2 97%   BMI 26.70 kg/m²     Physical Exam  Vitals reviewed.   Constitutional:       General: He is not in " "acute distress.     Appearance: He is well-developed.   HENT:      Head: Normocephalic and atraumatic.   Neck:      Thyroid: No thyromegaly.      Vascular: No carotid bruit or JVD.      Trachea: No tracheal deviation.   Cardiovascular:      Rate and Rhythm: Normal rate and regular rhythm.      Pulses: Normal pulses.      Heart sounds: Normal heart sounds. No murmur heard.     No friction rub. No gallop.   Pulmonary:      Effort: Pulmonary effort is normal. No respiratory distress.      Breath sounds: Normal breath sounds. No wheezing, rhonchi or rales.   Chest:      Chest wall: No tenderness.   Musculoskeletal:      Cervical back: Normal range of motion and neck supple.      Right lower leg: No edema.      Left lower leg: No edema.   Skin:     General: Skin is warm and dry.   Neurological:      General: No focal deficit present.      Mental Status: He is alert and oriented to person, place, and time.   Psychiatric:         Mood and Affect: Mood normal.         Behavior: Behavior normal.         Thought Content: Thought content normal.         Judgment: Judgment normal.         ======================================================  Imaging:   Results Review Statement: No pertinent imaging studies reviewed.    Portions of the record may have been created with voice recognition software. Occasional wrong word or \"sound a like\" substitutions may have occurred due to the inherent limitations of voice recognition software. Read the chart carefully and recognize, using context, where substitutions have occurred.    SIGNATURES:   Maikel Garrison MD   "

## 2024-11-25 NOTE — UTILIZATION REVIEW
NOTIFICATION OF ADMISSION DISCHARGE   This is a Notification of Discharge from [facility]. Please be advised that this patient has been discharge from our facility. Below you will find the admission and discharge date and time including the patient’s disposition.   UTILIZATION REVIEW CONTACT:  Krista Ratliff  Utilization   Network Utilization Review Department  Phone: [phone] x carefully listen to the prompts. All voicemails are confidential.  Email: [email or web address]     ADMISSION INFORMATION  PRESENTATION DATE: 11/20/2024  8:18 AM  OBERVATION ADMISSION DATE: N/A  INPATIENT ADMISSION DATE: 11/20/24  4:32 PM   DISCHARGE DATE: 11/24/2024  4:57 PM   DISPOSITION:Home/Self Care    Network Utilization Review Department  ATTENTION: Please call with any questions or concerns to [phone] and carefully listen to the prompts so that you are directed to the right person. All voicemails are confidential.   For Discharge needs, contact Care Management DC Support Team at [phone] opt. 2  Send all requests for admission clinical reviews, approved or denied determinations and any other requests to dedicated fax number below belonging to the campus where the patient is receiving treatment. List of dedicated fax numbers for the Facilities:  FACILITY NAME UR FAX NUMBER   ADMISSION DENIALS (Administrative/Medical Necessity) [phone]   DISCHARGE SUPPORT TEAM ([facility]) [phone]   PARENT CHILD HEALTH (Maternity/NICU/Pediatrics) [phone]   [facility] [phone]   [facility] [phone]   [facility] [phone]   [facility] [phone]   Replaced by [facility] [phone]   [facility] [phone]   [facility] [phone]   [facility]  [phone]   [facility] [phone]   Replaced by [facility] [phone]   [facility] [phone]   [facility] [phone]

## 2024-12-02 ENCOUNTER — TELEPHONE (OUTPATIENT)
Dept: FAMILY MEDICINE CLINIC | Facility: CLINIC | Age: 69
End: 2024-12-02

## 2024-12-02 NOTE — TELEPHONE ENCOUNTER
Pt came into office and is requesting if the following medications can be resent to the [facility] [address]. Pts [facility] to [facility] pharmacy being closed. Pt would like If all the medication to be resent due to pt not being able to get all medications, please advise.     aspirin (Aspirin Low Dose) 81 mg EC tablet     fluticasone (FLONASE) 50 mcg/act nasal spray      Empagliflozin (Jardiance) 10 MG TABS tablet   nicotine (NICODERM CQ) 21 mg/24 hr TD 24 hr patch     melatonin 3 mg   carvedilol (COREG) 6.25 mg tablet   sacubitril-valsartan (ENTRESTO) 24-26 MG TABS

## 2024-12-03 ENCOUNTER — OFFICE VISIT (OUTPATIENT)
Dept: FAMILY MEDICINE CLINIC | Facility: CLINIC | Age: 69
End: 2024-12-03

## 2024-12-03 VITALS
HEART RATE: 91 BPM | TEMPERATURE: 98 F | SYSTOLIC BLOOD PRESSURE: 100 MMHG | WEIGHT: 150 LBS | OXYGEN SATURATION: 99 % | BODY MASS INDEX: 27.6 KG/M2 | RESPIRATION RATE: 16 BRPM | DIASTOLIC BLOOD PRESSURE: 70 MMHG | HEIGHT: 62 IN

## 2024-12-03 DIAGNOSIS — J30.2 SEASONAL ALLERGIES: ICD-10-CM

## 2024-12-03 DIAGNOSIS — I50.9 ACUTE CHF (CONGESTIVE HEART FAILURE) (HCC): ICD-10-CM

## 2024-12-03 DIAGNOSIS — I50.23 ACUTE ON CHRONIC SYSTOLIC CONGESTIVE HEART FAILURE (HCC): ICD-10-CM

## 2024-12-03 DIAGNOSIS — I10 PRIMARY HYPERTENSION: ICD-10-CM

## 2024-12-03 DIAGNOSIS — I10 ESSENTIAL HYPERTENSION: ICD-10-CM

## 2024-12-03 DIAGNOSIS — Z72.0 TOBACCO USER: ICD-10-CM

## 2024-12-03 DIAGNOSIS — I50.42 CHRONIC COMBINED SYSTOLIC AND DIASTOLIC CONGESTIVE HEART FAILURE (HCC): Primary | ICD-10-CM

## 2024-12-03 DIAGNOSIS — E11.9 TYPE 2 DIABETES MELLITUS WITHOUT COMPLICATION, WITHOUT LONG-TERM CURRENT USE OF INSULIN (HCC): ICD-10-CM

## 2024-12-03 DIAGNOSIS — G47.00 INSOMNIA: ICD-10-CM

## 2024-12-03 DIAGNOSIS — E78.2 MIXED HYPERLIPIDEMIA: ICD-10-CM

## 2024-12-03 PROBLEM — Z13.6 SCREENING FOR AAA (AORTIC ABDOMINAL ANEURYSM): Status: RESOLVED | Noted: 2024-04-26 | Resolved: 2024-12-03

## 2024-12-03 PROBLEM — I50.20 SYSTOLIC HEART FAILURE (HCC): Status: RESOLVED | Noted: 2018-02-27 | Resolved: 2024-12-03

## 2024-12-03 PROBLEM — Z13.6 SCREENING FOR AAA (ABDOMINAL AORTIC ANEURYSM): Status: RESOLVED | Noted: 2020-08-20 | Resolved: 2024-12-03

## 2024-12-03 PROBLEM — L84 CALLUS OF FOOT: Status: RESOLVED | Noted: 2020-08-20 | Resolved: 2024-12-03

## 2024-12-03 PROCEDURE — 99214 OFFICE O/P EST MOD 30 MIN: CPT

## 2024-12-03 PROCEDURE — 3078F DIAST BP <80 MM HG: CPT

## 2024-12-03 PROCEDURE — 3074F SYST BP LT 130 MM HG: CPT

## 2024-12-03 PROCEDURE — G2211 COMPLEX E/M VISIT ADD ON: HCPCS

## 2024-12-03 RX ORDER — CARVEDILOL 6.25 MG/1
6.25 TABLET ORAL 2 TIMES DAILY WITH MEALS
Qty: 60 TABLET | Refills: 0 | Status: SHIPPED | OUTPATIENT
Start: 2024-12-03 | End: 2024-12-09 | Stop reason: SDUPTHER

## 2024-12-03 RX ORDER — CARVEDILOL 6.25 MG/1
6.25 TABLET ORAL 2 TIMES DAILY WITH MEALS
Qty: 60 TABLET | Refills: 0 | Status: SHIPPED | OUTPATIENT
Start: 2024-12-03 | End: 2024-12-03 | Stop reason: SDUPTHER

## 2024-12-03 RX ORDER — ASPIRIN 81 MG/1
81 TABLET ORAL DAILY
Qty: 90 TABLET | Refills: 0 | Status: SHIPPED | OUTPATIENT
Start: 2024-12-03 | End: 2024-12-03 | Stop reason: SDUPTHER

## 2024-12-03 RX ORDER — FLUTICASONE PROPIONATE 50 MCG
1 SPRAY, SUSPENSION (ML) NASAL DAILY
Qty: 16 G | Refills: 0 | Status: SHIPPED | OUTPATIENT
Start: 2024-12-03

## 2024-12-03 RX ORDER — ATORVASTATIN CALCIUM 40 MG/1
40 TABLET, FILM COATED ORAL
Qty: 90 TABLET | Refills: 1 | Status: SHIPPED | OUTPATIENT
Start: 2024-12-03 | End: 2024-12-09 | Stop reason: SDUPTHER

## 2024-12-03 RX ORDER — ASPIRIN 81 MG/1
81 TABLET ORAL DAILY
Qty: 90 TABLET | Refills: 0 | Status: SHIPPED | OUTPATIENT
Start: 2024-12-03

## 2024-12-03 RX ORDER — NICOTINE 21 MG/24HR
1 PATCH, TRANSDERMAL 24 HOURS TRANSDERMAL EVERY 24 HOURS
Qty: 10 PATCH | Refills: 0 | Status: SHIPPED | OUTPATIENT
Start: 2024-12-03

## 2024-12-03 RX ORDER — FLUTICASONE PROPIONATE 50 MCG
1 SPRAY, SUSPENSION (ML) NASAL DAILY
Qty: 16 G | Refills: 0 | Status: SHIPPED | OUTPATIENT
Start: 2024-12-03 | End: 2024-12-03 | Stop reason: SDUPTHER

## 2024-12-03 NOTE — PROGRESS NOTES
Name: Ruben Flores      : 1955      MRN: [id number]  Encounter Provider: TAMEKA Iverson  Encounter Date: 12/3/2024   Encounter department: [facility]  :  Assessment & Plan  Chronic combined systolic and diastolic congestive heart failure (HCC)  Wt Readings from Last 3 Encounters:   24 68 kg (150 lb)   24 66.2 kg (146 lb)   24 66.5 kg (146 lb 9.7 oz)       Continue management as per cardiology.        Orders:    carvedilol (COREG) 6.25 mg tablet; Take 1 tablet (6.25 mg total) by mouth 2 (two) times a day with meals    Empagliflozin (Jardiance) 10 MG TABS tablet; Take 1 tablet (10 mg total) by mouth every morning    sacubitril-valsartan (ENTRESTO) 24-26 MG TABS; Take 1 tablet by mouth 2 (two) times a day    Type 2 diabetes mellitus without complication, without long-term current use of insulin (HCC)    Lab Results   Component Value Date    HGBA1C 6.9 (H) 2024   Continue Jardiance.    Orders:    Ambulatory Referral to Podiatry; Future    Primary hypertension  BP Readings from Last 3 Encounters:   24 100/70   24 124/82   24 101/64     Continue entresto & coreg.       Tobacco user  Congratulated pt on success & encouraged pt to continue.  Continue nicotine patches       Mixed hyperlipidemia    Orders:    atorvastatin (LIPITOR) 40 mg tablet; Take 1 tablet (40 mg total) by mouth daily at bedtime    Essential hypertension    Orders:    aspirin (Aspirin Low Dose) 81 mg EC tablet; Take 1 tablet (81 mg total) by mouth daily    Seasonal allergies    Orders:    fluticasone (FLONASE) 50 mcg/act nasal spray; 1 spray into each nostril daily           History of Present Illness     Ruben Flores is a 69 y.o. male  has a past medical history of HLD (hyperlipidemia) and Hypertension.  has no past surgical history on file.    He presents today for a TCM.  TCM Call     None    TCM Call     None      Hospital Course:   Ruben  Mark is a 69 y.o. male patient with PMH of HTN, DM & HFrEF (EF of 35% in 2017) who originally presented to the hospital on 11/20/2024 due to  shortness of breath for one night, whereby he was awoken from his sleep because he felt like he couldnt breathe. He denied any associated chest pain, fever or swelling of his lower limbs. He does report coughing about 4 days ago but it self resolved. For his underlying DM & HTN, he had reported not taking his metformin for the past 15 days and his lisinopril for the past week, as he ran out of his medications. He denied any substance use.      Last week he came to the ED complaining of chest pain and cough and was diagnosed with pneumonia. He was discharged with Augmentin (a week course) and  azithromycin (4 day course).     ED COURSE  Vitals /94, , RR 40  T 97.7 SPO2 94%  Labs CMP glucose 168, Trop 0hr 13, , D-Dimer 0.96, otherwise CBC, UA, Procal COVID/FLU negative are WNL  Imaging  CXR & CTA chest : Constellation of findings suggesting volume overload with bilateral moderate pleural effusions, pulmonary vascular congestion, and interstitial and alveolar edema. No pulmonary emboli.   Meds/Interventions given was:   furosemide (LASIX) injection 20 mg  hydrocortisone (Solu-CORTEF) injection 200 mg  diphenhydrAMINE (BENADRYL) injection 50 mg     We admitted him to  service due to acute exacerbation of CHF     During his hospital admission, his SOB improved after  day 2 of receiving 20 mg of IV lasix and his lungs were clear to auscultation. Lasix 20 mg PO was continued throughouyt his admission. He was also initially put on 2 L of oxygen on admission and later weaned off of oxygen by day 2 to breathing on room air with SPO2 between 95%-100% .   He was seen by Cardiology, an echo was ordered and he was found to have an EF of 27% as compared to his last echo done in 2017 with EF of 36% . Apparently he had not been aware of his preexisting heart  "condition thus was not on any heart failure medications. As per cardio recommendation, his lisinopril was discontinued to start entresto after 48 hours wash period.  Also his metformin has been discontinued and started on empagliflozin 10 mg p.o. daily.    On the day of discharge per cardiologist recommendation his Entresto dosage has been decreased to(24/26 mg), his Coreg dosage has been increased to 6.25 mg.  He denies shortness of breath, cough, chest pain, palpitation or leg swelling. He was also informed to follow up with cardio 3 days after discharge for possibilities of being on a Lifevest.     Today, he denies chest pain, SOB, leg swelling, fatigue, palpitations. He is weighing himself at home, home weight was 145 lbs today. He reports compliance with Jardiance 10 mg daily, Entresto 24-26 mg daily,  Lipitor 40 mg daily, ASA 81 mg daily, coreg 6.25 mg BID, nicotine patch. However, he needs medications sent over to new pharmacy. He reports that he has not smoked in over 20 days. He is limiting his salt & fluid intake. He is aware that he needs to complete BMP 1-2 days prior to cardiology appt next week.       Review of Systems  As per HPI         Objective   /70 (BP Location: Right arm, Patient Position: Sitting, Cuff Size: Standard)   Pulse 91   Temp 98 °F (36.7 °C) (Temporal)   Resp 16   Ht 5' 2\" (1.575 m)   Wt 68 kg (150 lb)   SpO2 99%   BMI 27.44 kg/m²      Physical Exam  Vitals and nursing note reviewed.   Constitutional:       Appearance: He is well-developed and overweight.   HENT:      Head: Normocephalic and atraumatic.      Right Ear: External ear normal.      Left Ear: External ear normal.      Nose: Nose normal.   Eyes:      Conjunctiva/sclera: Conjunctivae normal.   Cardiovascular:      Rate and Rhythm: Normal rate and regular rhythm.      Pulses: Normal pulses. no weak pulses.           Dorsalis pedis pulses are 2+ on the right side and 2+ on the left side.        Posterior tibial " pulses are 2+ on the right side and 2+ on the left side.      Heart sounds: Normal heart sounds. No murmur heard.     No friction rub. No gallop.   Pulmonary:      Effort: Pulmonary effort is normal. No respiratory distress.      Breath sounds: Normal breath sounds.   Abdominal:      General: Bowel sounds are normal.      Palpations: Abdomen is soft.      Tenderness: There is no abdominal tenderness.   Musculoskeletal:         General: Normal range of motion.      Cervical back: Normal range of motion and neck supple.      Right lower leg: No edema.      Left lower leg: No edema.   Feet:      Right foot:      Skin integrity: Callus and dry skin present. No ulcer, skin breakdown, erythema or warmth.      Left foot:      Skin integrity: Callus and dry skin present. No ulcer, skin breakdown, erythema or warmth.   Skin:     General: Skin is warm and dry.      Capillary Refill: Capillary refill takes less than 2 seconds.   Neurological:      General: No focal deficit present.      Mental Status: He is alert and oriented to person, place, and time. Mental status is at baseline.   Psychiatric:         Mood and Affect: Mood normal.         Behavior: Behavior normal.         Thought Content: Thought content normal.         Judgment: Judgment normal.     Patient's shoes and socks removed.    Right Foot/Ankle   Right Foot Inspection  Skin Exam: skin normal, skin intact, dry skin, callus and callus. No warmth, no erythema, no maceration, no abnormal color, no pre-ulcer and no ulcer.     Toe Exam: ROM and strength within normal limits.     Sensory   Vibration: intact  Proprioception: intact  Monofilament testing: intact    Vascular  Capillary refills: < 3 seconds  The right DP pulse is 2+. The right PT pulse is 2+.     Left Foot/Ankle  Left Foot Inspection  Skin Exam: skin normal, skin intact, dry skin and callus. No warmth, no erythema, no maceration, normal color, no pre-ulcer and no ulcer.     Toe Exam: ROM and strength  within normal limits.     Sensory   Vibration: intact  Proprioception: intact  Monofilament testing: intact    Vascular  Capillary refills: < 3 seconds  The left DP pulse is 2+. The left PT pulse is 2+.     Assign Risk Category  No deformity present  No loss of protective sensation  No weak pulses  Risk: 0

## 2024-12-03 NOTE — TELEPHONE ENCOUNTER
1 week follow up:  Boris Workman # [id number]  Spoke with patient      Self-management/education and teach back:  Primary learner: self   Following low sodium diet: states he is   Following fluid restriction: states  he is  Hospital discharge weight: 146.9 lbs   11/25 OV weight 146 lbs  Weighing daily: yes               Weight today: 143 lbs  Monitoring symptoms: yes  Any current symptoms: no  Knows when to call provider: yes, reviewed  Medication reviewed and taking all as prescribed (bring medications to follow up visits): ASA 81 mg daily, atorvastatin 40 mg daily, carvedilol 6.25 mg twice daily, jardiance 10 mg daily, Entresol 24-26 mg twice daily  Any labs/studies needed for follow up: BMP the end of this week for appointment on Mon 12/9  Escalation plan: reviewed  HF education reviewed/reinforced including low sodium diet, 64 oz fluid restriction, activity, symptoms of decompensation and when and who to call.     Care Coordination:  Aware of cardiology follow up appointment: 12/9/24 [facility]  Aware of PCP follow up appointment: has appointment today  Transportation: self  Social Support: family  Health literacy: fair  Engagement: fair  Additional comments:ok with weekly calls.

## 2024-12-03 NOTE — ASSESSMENT & PLAN NOTE
Lab Results   Component Value Date    HGBA1C 6.9 (H) 11/20/2024   Continue Jardiance.    Orders:    Ambulatory Referral to Podiatry; Future

## 2024-12-03 NOTE — ASSESSMENT & PLAN NOTE
BP Readings from Last 3 Encounters:   12/03/24 100/70   11/25/24 124/82   11/24/24 101/64     Continue entresto & coreg.

## 2024-12-03 NOTE — ASSESSMENT & PLAN NOTE
Wt Readings from Last 3 Encounters:   12/03/24 68 kg (150 lb)   11/25/24 66.2 kg (146 lb)   11/24/24 66.5 kg (146 lb 9.7 oz)       Continue management as per cardiology.        Orders:    carvedilol (COREG) 6.25 mg tablet; Take 1 tablet (6.25 mg total) by mouth 2 (two) times a day with meals    Empagliflozin (Jardiance) 10 MG TABS tablet; Take 1 tablet (10 mg total) by mouth every morning    sacubitril-valsartan (ENTRESTO) 24-26 MG TABS; Take 1 tablet by mouth 2 (two) times a day

## 2024-12-04 ENCOUNTER — TELEPHONE (OUTPATIENT)
Dept: FAMILY MEDICINE CLINIC | Facility: CLINIC | Age: 69
End: 2024-12-04

## 2024-12-04 NOTE — TELEPHONE ENCOUNTER
Wagner Lara Pt came into office in regards to needing his medications for sugar and cholesterol.Pt has an appointment with you on 12/3/24. Pt stated you know what medications they are and he didn't provide me any names or information because he was unaware.    Please advise if you can assist?

## 2024-12-04 NOTE — PROGRESS NOTES
Ruben Flores  1955  [id number]  CARDIOVASC PHYSICIAN  [address] [address]  [phone]  [phone]    1. Chronic combined systolic and diastolic congestive heart failure (HCC)        2. Primary hypertension        3. Mixed hyperlipidemia        4. Tobacco user        5. Acute CHF (congestive heart failure) (HCC)  Ambulatory referral to Cardiology        Assessment/Plan    TODAY (12/09/24):   Appears euvolemic on exam today, continue current regimen. Advised to call with a weight gain of > 3 lsb in one day or > 5 lbs in one week.  BP on the lower side today likely from poor hydration. Denies any lightheadedness/dizziness. Encouraged adequate hydration with at least 64 ounces of water   Encouraged him to call PCP for lancets and glucose monitoring  Ordered repeat TTE for end of Feb/beginning of March  RTO 01/20/24 with me      Chronic combined systolic and diastolic heart failure, etiology NICM  - nonobstructive CAD per cath at [facility] 04/14/17  - TTE 11/21/24: LVEF 27%, LVIDD 5.7 cm, severe global hypokinesis with ungraded restricitve relaxation, elevated LA pressure, severe LA, mild MAC, mild to moderate TR, PASP 59 mmHg  - discharge weight 11/24/24: 146 lbs  - office weight today: 149 lbs   - Neurohormonal Blockade:   -- beta blocker: coreg 6.25 mg BID  -- ACE/ARB/ARNi: entresto 24-26 mg BID  -- aldosterone antagonist: none   -- SGLT2: jardiance 10 mg daily  -- diuretic: none   Hypertension   - BP in office:  - current rx: entresto 24-26 mg BID and coreg 6.25 mg BID  Mixed hyperlipidemia   - lipid panel 01/30/24: cholesterol 176, triglycerides 125, HDL 59, LDL 92  - continue atorvastatin 40 mg daily  Tobacco use     Interval History:   This is a 69 y/o male with a PMH of chronic combined CHF (etiology NICM), HTN, HLD, and tobacco use who is presenting today for CHF follow up. He was admitted to [facility] 11/20.24-11/24/24 with SOB. Cardiac workup in the ED included , negative troponin x  3, CTA which showed moderate bilateral pleural effusions, and vascular congestion. He was admitted to the floor and diuresed 7 lbs. He was starte don GDMT inlcuding entresto, jardiance, and coreg.    Pt states he is doing well from a cardiac standpoint. He continues to weigh himself on a daily basis and stays he is stable around 150 lbs. He is following a low salt diet. BP is low today. Pt states he barely drinks any water because he was told not to. I educated him on the importance of drinking at least 64 ounces of water a day. He follows a low salt diet. Reviewed his latest blood work with him and let him know his electrolytes and kidney function were stable. Blood glucose elevated. He does noot check his sugars at home. Denies chest pain, SOB, MARTINEZ, lightheadedness, dizziness, and syncope. Denies edema and abdominal bloating.    Past Medical History:   Diagnosis Date    HLD (hyperlipidemia)     Hypertension      Social History     Socioeconomic History    Marital status: /Civil Union     Spouse name: Not on file    Number of children: Not on file    Years of education: Not on file    Highest education level: Not on file   Occupational History    Not on file   Tobacco Use    Smoking status: Every Day     Types: Cigarettes     Start date: 2024     Passive exposure: Current    Smokeless tobacco: Former   Vaping Use    Vaping status: Some Days    Substances: Flavoring   Substance and Sexual Activity    Alcohol use: Not Currently    Drug use: Not Currently     Comment: past use of Cocaine    Sexual activity: Not on file   Other Topics Concern    Not on file   Social History Narrative    Not on file     Social Drivers of Health     Financial Resource Strain: Medium Risk (4/26/2024)    Overall Financial Resource Strain (CARDIA)     Difficulty of Paying Living Expenses: Somewhat hard   Food Insecurity: No Food Insecurity (11/20/2024)    Nursing - Inadequate Food Risk Classification     Worried About Running Out of  Food in the Last Year: Never true     Ran Out of Food in the Last Year: Never true     Ran Out of Food in the Last Year: 1   Transportation Needs: No Transportation Needs (2024)    Nursing - Transportation Risk Classification     Lack of Transportation: Not on file     Lack of Transportation: 2   Physical Activity: Not on file   Stress: Not on file   Social Connections: Not on file   Intimate Partner Violence: Unknown (2024)    Nursing IPS     Feels Physically and Emotionally Safe: Not on file     Physically Hurt by Someone: Not on file     Humiliated or Emotionally Abused by Someone: Not on file     Physically Hurt by Someone: 2     Hurt or Threatened by Someone: 2   Housing Stability: Unknown (2024)    Nursing: Inadequate Housing Risk Classification     Has Housing: Not on file     Worried About Losing Housing: Not on file     Unable to Get Utilities: Not on file     Unable to Pay for Housing in the Last Year: 2     Has Housin      No family history on file.  No past surgical history on file.    Current Outpatient Medications:     aspirin (Aspirin Low Dose) 81 mg EC tablet, Take 1 tablet (81 mg total) by mouth daily, Disp: 90 tablet, Rfl: 0    atorvastatin (LIPITOR) 40 mg tablet, Take 1 tablet (40 mg total) by mouth daily at bedtime, Disp: 90 tablet, Rfl: 1    carvedilol (COREG) 6.25 mg tablet, Take 1 tablet (6.25 mg total) by mouth 2 (two) times a day with meals, Disp: 60 tablet, Rfl: 0    Empagliflozin (Jardiance) 10 MG TABS tablet, Take 1 tablet (10 mg total) by mouth every morning, Disp: 30 tablet, Rfl: 1    fluticasone (FLONASE) 50 mcg/act nasal spray, 1 spray into each nostril daily, Disp: 16 g, Rfl: 0    melatonin 3 mg, Take 1 tablet (3 mg total) by mouth daily at bedtime, Disp: 15 tablet, Rfl: 0    nicotine (NICODERM CQ) 21 mg/24 hr TD 24 hr patch, Place 1 patch on the skin over 24 hours every 24 hours, Disp: 10 patch, Rfl: 0    sacubitril-valsartan (ENTRESTO) 24-26 MG TABS, Take 1  tablet by mouth 2 (two) times a day, Disp: 60 tablet, Rfl: 1  Allergies   Allergen Reactions    Pollen Extract Itching    Ioversol Rash       Labs:     Chemistry        Component Value Date/Time    K 4.7 12/05/2024 1410     12/05/2024 1410    CO2 30 12/05/2024 1410    BUN 12 12/05/2024 1410    CREATININE 0.93 12/05/2024 1410        Component Value Date/Time    CALCIUM 9.2 12/05/2024 1410    ALKPHOS 83 11/25/2024 1246    AST 25 11/25/2024 1246    ALT 23 11/25/2024 1246          Lab Results   Component Value Date    HDL 59 01/30/2024    HDL 42 05/20/2021    HDL 37 (L) 01/11/2019     Lab Results   Component Value Date    LDLCALC 92 01/30/2024    LDLCALC 132 (H) 05/20/2021    LDLCALC 82 01/11/2019     Lab Results   Component Value Date    TRIG 125 01/30/2024    TRIG 205 (H) 05/20/2021    TRIG 142 01/11/2019     Imaging: XR chest pa and lateral  Result Date: 11/24/2024  Narrative: XR CHEST PA AND LATERAL INDICATION: CHF. COMPARISON: Chest radiograph 11/20/2024 FINDINGS: There is prominence of the interstitial markings. There is a left basilar opacity. There is mild blunting of the bilateral costophrenic angles. No pneumothorax. . Normal cardiomediastinal silhouette. Bones are unremarkable for age. Normal upper abdomen.     Impression: 1. Prominence of the interstitial markings suggesting prominent pulmonary interstitial edema. 2. Retrocardiac opacity may represent atelectasis versus pneumonia. Clinical and laboratory correlation is recommended. 3. Small bilateral pleural effusions. Workstation performed: [id number]     Echo complete w/ contrast if indicated  Result Date: 11/22/2024  Narrative:   Left Ventricle: Left ventricular cavity size is normal. Wall thickness is normal. The left ventricular ejection fraction is 27% by visual estimation. Systolic function is severely reduced. Global longitudinal strain is reduced at -9%.  Strain shows some degree of apical sparing.  Can consider additional workup as  clinically indicated. There is severe global hypokinesis with regional variation. Diastolic function is severely abnormal, consistent with ungraded restrictive relaxation.  Left atrial filling pressure is elevated.   Right Ventricle: Right ventricular cavity size is normal. Systolic function is low normal.   Left Atrium: The atrium is severely dilated.   Aortic Valve: There is trace regurgitation.   Mitral Valve: There is mild annular calcification. There is mild regurgitation.   Tricuspid Valve: There is mild to moderate regurgitation. Pulmonary artery systolic pressures are estimated at 59 mmHg.   There is no study for comparison.     XR chest 2 views  Result Date: 11/20/2024  Narrative: AP CHEST RADIOGRAPH CTA - CHEST WITH IV CONTRAST - PULMONARY ANGIOGRAM INDICATION: cp,sob, elevated d-dimer. COMPARISON: Chest radiograph dated 11/20/2024 TECHNIQUE: Initially, semiupright AP chest radiograph was performed. Subsequently, CTA examination of the chest was performed using angiographic technique according to a protocol specifically tailored to evaluate for pulmonary embolism. Multiplanar 2D reformatted images were created from the source data. In addition, coronal 3D MIP postprocessing was performed on the acquisition scanner. Radiation dose length product (DLP) for this visit: CT--406 mGy-cm  (accession [id number]), XR--0 mGy  (accession [id number]). This examination, like all CT scans performed in the [facility], was performed utilizing techniques to minimize radiation dose exposure, including the use of iterative reconstruction and automated exposure control. IV Contrast: 80 mL of iohexol (OMNIPAQUE) FINDINGS: --CHEST RADIOGRAPH-- Mild prominence of the cardiomediastinal silhouette even when accounting for portable technique. Pulmonary vasculature is engorged and indistinct. Generalized increase in peripheral reticular lung markings (Kerley B lines). Patchy central-predominant opacities in both lungs.  "No radiographic effusions evident, though present on subsequent CT. No pneumothorax. Bones are unremarkable. --PULMONARY CTA-- PULMONARY ARTERIAL TREE: Central pulmonary arteries are normal in caliber. No pulmonary emboli demonstrated. \"Supine cephalization\" with disproportionate prominence of the anterior pulmonary vasculature. LUNGS: Reticular interlobular septal thickening and central-predominant symmetric groundglass opacities in keeping with interstitial and alveolar edema, respectively. No focal pneumonic consolidation. Mild diffuse airway thickening also attributable to third spacing. No concerning endobronchial lesions. No suspicious pulmonary nodules or masses. PLEURA: Moderate bilateral layering pleural effusions without loculated components. HEART/GREAT VESSELS: Mild four-chamber enlargement of the heart. No pericardial effusion. No thoracic aortic aneurysm. MEDIASTINUM AND ESTELA: Unremarkable. CHEST WALL AND LOWER NECK: Unremarkable. VISUALIZED STRUCTURES IN THE UPPER ABDOMEN: Question gallstone at the proximal body on lower-most image without visualized findings concerning for cholecystitis. No acute findings in the imaged portion of the upper abdomen. OSSEOUS STRUCTURES: Old, healed posterior left rib fractures. No acute fractures or destructive osseous lesions.     Impression: 1.  Constellation of findings suggesting volume overload with bilateral moderate pleural effusions, pulmonary vascular congestion, and interstitial and alveolar edema. 2.  No pulmonary emboli. Workstation performed: [id number]     CTA chest pe study  Result Date: 11/20/2024  Narrative: AP CHEST RADIOGRAPH CTA - CHEST WITH IV CONTRAST - PULMONARY ANGIOGRAM INDICATION: cp,sob, elevated d-dimer. COMPARISON: Chest radiograph dated 11/20/2024 TECHNIQUE: Initially, semiupright AP chest radiograph was performed. Subsequently, CTA examination of the chest was performed using angiographic technique according to a protocol specifically " "tailored to evaluate for pulmonary embolism. Multiplanar 2D reformatted images were created from the source data. In addition, coronal 3D MIP postprocessing was performed on the acquisition scanner. Radiation dose length product (DLP) for this visit: CT--406 mGy-cm  (accession [id number]), XR--0 mGy  (accession [id number]). This examination, like all CT scans performed in the [facility], was performed utilizing techniques to minimize radiation dose exposure, including the use of iterative reconstruction and automated exposure control. IV Contrast: 80 mL of iohexol (OMNIPAQUE) FINDINGS: --CHEST RADIOGRAPH-- Mild prominence of the cardiomediastinal silhouette even when accounting for portable technique. Pulmonary vasculature is engorged and indistinct. Generalized increase in peripheral reticular lung markings (Kerley B lines). Patchy central-predominant opacities in both lungs. No radiographic effusions evident, though present on subsequent CT. No pneumothorax. Bones are unremarkable. --PULMONARY CTA-- PULMONARY ARTERIAL TREE: Central pulmonary arteries are normal in caliber. No pulmonary emboli demonstrated. \"Supine cephalization\" with disproportionate prominence of the anterior pulmonary vasculature. LUNGS: Reticular interlobular septal thickening and central-predominant symmetric groundglass opacities in keeping with interstitial and alveolar edema, respectively. No focal pneumonic consolidation. Mild diffuse airway thickening also attributable to third spacing. No concerning endobronchial lesions. No suspicious pulmonary nodules or masses. PLEURA: Moderate bilateral layering pleural effusions without loculated components. HEART/GREAT VESSELS: Mild four-chamber enlargement of the heart. No pericardial effusion. No thoracic aortic aneurysm. MEDIASTINUM AND ESTELA: Unremarkable. CHEST WALL AND LOWER NECK: Unremarkable. VISUALIZED STRUCTURES IN THE UPPER ABDOMEN: Question gallstone at the proximal body on " "lower-most image without visualized findings concerning for cholecystitis. No acute findings in the imaged portion of the upper abdomen. OSSEOUS STRUCTURES: Old, healed posterior left rib fractures. No acute fractures or destructive osseous lesions.     Impression: 1.  Constellation of findings suggesting volume overload with bilateral moderate pleural effusions, pulmonary vascular congestion, and interstitial and alveolar edema. 2.  No pulmonary emboli. Workstation performed: [id number]     XR chest 1 view portable  Result Date: 11/13/2024  Narrative: XR CHEST PORTABLE INDICATION: cough. COMPARISON: Chest radiograph December 2, 2019 FINDINGS: New bilateral perihilar and peripheral reticular opacities with more focal opacity in the right lower lung. No pneumothorax or pleural effusion. Normal cardiomediastinal silhouette. Chronic right rib fractures. No acute osseous abnormality. Normal upper abdomen.     Impression: Pulmonary edema and/or pneumonia. Patient's smoking history is noted, and follow-up noncontrast chest is advised in 3 months. The study was marked in EPIC for immediate notification. Workstation performed: [id number]       Review of Systems   Constitutional: Negative for chills, diaphoresis, fever, malaise/fatigue and weight gain.   Cardiovascular:  Negative for chest pain, dyspnea on exertion, irregular heartbeat, leg swelling, near-syncope, orthopnea, palpitations, paroxysmal nocturnal dyspnea and syncope.   Respiratory:  Negative for cough, shortness of breath, sleep disturbances due to breathing, snoring and wheezing.    Skin:  Negative for rash.   Gastrointestinal:  Negative for bloating, abdominal pain and nausea.   Neurological:  Negative for dizziness and light-headedness.       Vitals:    12/09/24 1348   BP: 100/50   Pulse: 80     Vitals:    12/09/24 1348   Weight: 67.9 kg (149 lb 12.8 oz)     Height: 5' 2\" (157.5 cm)   Body mass index is 27.4 kg/m².    Physical Exam  Vitals and nursing note " reviewed.   Constitutional:       General: He is not in acute distress.     Appearance: Normal appearance. He is not ill-appearing.   HENT:      Head: Normocephalic and atraumatic.      Nose: Nose normal.      Mouth/Throat:      Mouth: Mucous membranes are moist.   Eyes:      Conjunctiva/sclera: Conjunctivae normal.   Cardiovascular:      Rate and Rhythm: Normal rate and regular rhythm.      Pulses:           Radial pulses are 2+ on the right side and 2+ on the left side.      Heart sounds: S1 normal and S2 normal. Murmur heard.      Systolic murmur is present with a grade of 2/6.   Pulmonary:      Effort: Pulmonary effort is normal. No respiratory distress.      Breath sounds: Normal breath sounds. No stridor. No wheezing, rhonchi or rales.   Abdominal:      General: There is no distension.   Musculoskeletal:      Cervical back: Neck supple.      Right lower leg: No edema.      Left lower leg: No edema.   Skin:     General: Skin is warm.      Capillary Refill: Capillary refill takes less than 2 seconds.   Neurological:      General: No focal deficit present.      Mental Status: He is alert.   Psychiatric:         Thought Content: Thought content normal.

## 2024-12-05 ENCOUNTER — APPOINTMENT (OUTPATIENT)
Dept: LAB | Facility: HOSPITAL | Age: 69
End: 2024-12-05
Payer: COMMERCIAL

## 2024-12-05 DIAGNOSIS — I50.42 CHRONIC COMBINED SYSTOLIC AND DIASTOLIC CONGESTIVE HEART FAILURE (HCC): ICD-10-CM

## 2024-12-05 LAB
ANION GAP SERPL CALCULATED.3IONS-SCNC: 6 MMOL/L (ref 4–13)
BUN SERPL-MCNC: 12 MG/DL (ref 5–25)
CALCIUM SERPL-MCNC: 9.2 MG/DL (ref 8.4–10.2)
CHLORIDE SERPL-SCNC: 102 MMOL/L (ref 96–108)
CO2 SERPL-SCNC: 30 MMOL/L (ref 21–32)
CREAT SERPL-MCNC: 0.93 MG/DL (ref 0.6–1.3)
GFR SERPL CREATININE-BSD FRML MDRD: 84 ML/MIN/1.73SQ M
GLUCOSE SERPL-MCNC: 172 MG/DL (ref 65–140)
POTASSIUM SERPL-SCNC: 4.7 MMOL/L (ref 3.5–5.3)
SODIUM SERPL-SCNC: 138 MMOL/L (ref 135–147)

## 2024-12-05 PROCEDURE — 80048 BASIC METABOLIC PNL TOTAL CA: CPT

## 2024-12-05 PROCEDURE — 36415 COLL VENOUS BLD VENIPUNCTURE: CPT

## 2024-12-09 ENCOUNTER — OFFICE VISIT (OUTPATIENT)
Dept: CARDIOLOGY CLINIC | Facility: CLINIC | Age: 69
End: 2024-12-09
Payer: COMMERCIAL

## 2024-12-09 VITALS
DIASTOLIC BLOOD PRESSURE: 50 MMHG | WEIGHT: 149.8 LBS | SYSTOLIC BLOOD PRESSURE: 100 MMHG | HEART RATE: 80 BPM | HEIGHT: 62 IN | BODY MASS INDEX: 27.57 KG/M2

## 2024-12-09 DIAGNOSIS — Z72.0 TOBACCO USER: ICD-10-CM

## 2024-12-09 DIAGNOSIS — I50.9 ACUTE CHF (CONGESTIVE HEART FAILURE) (HCC): ICD-10-CM

## 2024-12-09 DIAGNOSIS — I50.42 CHRONIC COMBINED SYSTOLIC AND DIASTOLIC CONGESTIVE HEART FAILURE (HCC): Primary | ICD-10-CM

## 2024-12-09 DIAGNOSIS — E78.2 MIXED HYPERLIPIDEMIA: ICD-10-CM

## 2024-12-09 DIAGNOSIS — I10 PRIMARY HYPERTENSION: ICD-10-CM

## 2024-12-09 PROCEDURE — 99214 OFFICE O/P EST MOD 30 MIN: CPT

## 2024-12-09 PROCEDURE — G2211 COMPLEX E/M VISIT ADD ON: HCPCS

## 2024-12-09 RX ORDER — CARVEDILOL 6.25 MG/1
6.25 TABLET ORAL 2 TIMES DAILY WITH MEALS
Qty: 180 TABLET | Refills: 1 | Status: SHIPPED | OUTPATIENT
Start: 2024-12-09

## 2024-12-09 RX ORDER — ATORVASTATIN CALCIUM 40 MG/1
40 TABLET, FILM COATED ORAL
Qty: 90 TABLET | Refills: 1 | Status: SHIPPED | OUTPATIENT
Start: 2024-12-09

## 2024-12-09 NOTE — TELEPHONE ENCOUNTER
2 week hospital follow up:  Patient had visit with [facility] today, weights have been around 150 lbs at home  Will continue same medications  JOE ordered for the end of Feb

## 2025-01-13 ENCOUNTER — APPOINTMENT (OUTPATIENT)
Dept: LAB | Facility: CLINIC | Age: 70
End: 2025-01-13
Payer: COMMERCIAL

## 2025-01-13 ENCOUNTER — OFFICE VISIT (OUTPATIENT)
Dept: FAMILY MEDICINE CLINIC | Facility: CLINIC | Age: 70
End: 2025-01-13

## 2025-01-13 VITALS
WEIGHT: 161.2 LBS | RESPIRATION RATE: 18 BRPM | SYSTOLIC BLOOD PRESSURE: 124 MMHG | DIASTOLIC BLOOD PRESSURE: 76 MMHG | HEIGHT: 62 IN | OXYGEN SATURATION: 99 % | BODY MASS INDEX: 29.66 KG/M2 | HEART RATE: 96 BPM | TEMPERATURE: 97.5 F

## 2025-01-13 DIAGNOSIS — E11.9 TYPE 2 DIABETES MELLITUS WITHOUT COMPLICATION, WITHOUT LONG-TERM CURRENT USE OF INSULIN (HCC): Primary | ICD-10-CM

## 2025-01-13 DIAGNOSIS — I10 PRIMARY HYPERTENSION: ICD-10-CM

## 2025-01-13 DIAGNOSIS — Z23 ENCOUNTER FOR IMMUNIZATION: ICD-10-CM

## 2025-01-13 DIAGNOSIS — E78.2 MIXED HYPERLIPIDEMIA: ICD-10-CM

## 2025-01-13 DIAGNOSIS — Z72.0 TOBACCO USER: ICD-10-CM

## 2025-01-13 DIAGNOSIS — Z12.5 PROSTATE CANCER SCREENING: ICD-10-CM

## 2025-01-13 DIAGNOSIS — I50.42 CHRONIC COMBINED SYSTOLIC AND DIASTOLIC CONGESTIVE HEART FAILURE (HCC): ICD-10-CM

## 2025-01-13 LAB
CREAT UR-MCNC: 128.2 MG/DL
MICROALBUMIN UR-MCNC: 11.2 MG/L
MICROALBUMIN/CREAT 24H UR: 9 MG/G CREATININE (ref 0–30)
PSA SERPL-MCNC: 6.54 NG/ML (ref 0–4)

## 2025-01-13 PROCEDURE — 90472 IMMUNIZATION ADMIN EACH ADD: CPT

## 2025-01-13 PROCEDURE — 90471 IMMUNIZATION ADMIN: CPT

## 2025-01-13 PROCEDURE — G0103 PSA SCREENING: HCPCS

## 2025-01-13 PROCEDURE — 3074F SYST BP LT 130 MM HG: CPT

## 2025-01-13 PROCEDURE — 90677 PCV20 VACCINE IM: CPT

## 2025-01-13 PROCEDURE — 36415 COLL VENOUS BLD VENIPUNCTURE: CPT

## 2025-01-13 PROCEDURE — 82570 ASSAY OF URINE CREATININE: CPT

## 2025-01-13 PROCEDURE — G0009 ADMIN PNEUMOCOCCAL VACCINE: HCPCS

## 2025-01-13 PROCEDURE — 90750 HZV VACC RECOMBINANT IM: CPT

## 2025-01-13 PROCEDURE — 82043 UR ALBUMIN QUANTITATIVE: CPT

## 2025-01-13 PROCEDURE — 3078F DIAST BP <80 MM HG: CPT

## 2025-01-13 PROCEDURE — 99213 OFFICE O/P EST LOW 20 MIN: CPT

## 2025-01-13 RX ORDER — ATORVASTATIN CALCIUM 40 MG/1
40 TABLET, FILM COATED ORAL
Qty: 90 TABLET | Refills: 4 | Status: SHIPPED | OUTPATIENT
Start: 2025-01-13

## 2025-01-13 RX ORDER — NICOTINE 21 MG/24HR
1 PATCH, TRANSDERMAL 24 HOURS TRANSDERMAL EVERY 24 HOURS
Qty: 30 PATCH | Refills: 5 | Status: SHIPPED | OUTPATIENT
Start: 2025-01-13 | End: 2025-02-12

## 2025-01-13 RX ORDER — CARVEDILOL 6.25 MG/1
6.25 TABLET ORAL 2 TIMES DAILY WITH MEALS
Qty: 180 TABLET | Refills: 1 | Status: SHIPPED | OUTPATIENT
Start: 2025-01-13 | End: 2025-01-22 | Stop reason: SDUPTHER

## 2025-01-13 NOTE — PROGRESS NOTES
Ruben Flores  1955  [id number]  CARDIOVASC PHYSICIAN  [address] [facility] [address]  [phone]  [phone]    1. Chronic combined systolic and diastolic congestive heart failure (HCC)        2. Primary hypertension        3. Mixed hyperlipidemia        4. Tobacco user        5. Essential hypertension            Assessment/Plan  TODAY (01/20/25):   He appears overall euvolemic with stable weights. Will continue current regimen as outlined below  BP stable, continue rx.   Continue atorvastatin for cholesterol control   RTO 04/07/24 with Dr. Garrison after echo      Chronic combined systolic and diastolic heart failure, etiology NICM  - nonobstructive CAD per cath at [facility] 04/14/17  - TTE 11/21/24: LVEF 27%, LVIDD 5.7 cm, severe global hypokinesis with ungraded restricitve relaxation, elevated LA pressure, severe LA, mild MAC, mild to moderate TR, PASP 59 mmHg  - discharge weight 12/09/24: 149 lbs  - office weight today: 148 lbs   - Neurohormonal Blockade:   -- beta blocker: coreg 6.25 mg BID  -- ACE/ARB/ARNi: entresto 24-26 mg BID  -- aldosterone antagonist: none   -- SGLT2: jardiance 10 mg daily  -- diuretic: none   Hypertension   - BP in office:130/74 mmHg  - current rx: entresto 24-26 mg BID and coreg 6.25 mg BID  Mixed hyperlipidemia   - lipid panel 01/30/24: cholesterol 176, triglycerides 125, HDL 59, LDL 92  - continue atorvastatin 40 mg daily  Tobacco use     Interval History:   This is a 71 y/o male with a PMH of chronic combined CHF (etiology NICM), HTN, HLD, and tobacco use who is presenting today for CHF follow up. He was last seen in office 12/09/24 by me. At this visit, he was doing overall well. BP was on the lower side as he was unsure how much he should be drinking with CHF.    Translation provided by [employer] interpretor. He states he has been doing well overall from a cardiac standpoint. He continues to take all his medications as directed. He is weighting himself at home and states  home weights are around 140 lbs. Watching his salt intake closely. He denies any edema, SOB, chest pian/chest pressure, abdominal bloating, palpitations, or flutters in his chest. Reviewed the date to his TTE scheduled in March.     Past Medical History:   Diagnosis Date    HLD (hyperlipidemia)     Hypertension      Social History     Socioeconomic History    Marital status: /Civil Union     Spouse name: Not on file    Number of children: Not on file    Years of education: Not on file    Highest education level: Not on file   Occupational History    Not on file   Tobacco Use    Smoking status: Every Day     Types: Cigarettes     Start date: 2024     Passive exposure: Current    Smokeless tobacco: Former   Vaping Use    Vaping status: Some Days    Substances: Flavoring   Substance and Sexual Activity    Alcohol use: Not Currently    Drug use: Not Currently     Comment: past use of Cocaine    Sexual activity: Not on file   Other Topics Concern    Not on file   Social History Narrative    Not on file     Social Drivers of Health     Financial Resource Strain: Medium Risk (4/26/2024)    Overall Financial Resource Strain (CARDIA)     Difficulty of Paying Living Expenses: Somewhat hard   Food Insecurity: No Food Insecurity (11/20/2024)    Nursing - Inadequate Food Risk Classification     Worried About Running Out of Food in the Last Year: Never true     Ran Out of Food in the Last Year: Never true     Ran Out of Food in the Last Year: Never true   Transportation Needs: No Transportation Needs (11/20/2024)    Nursing - Transportation Risk Classification     Lack of Transportation: Not on file     Lack of Transportation: No   Physical Activity: Not on file   Stress: Not on file   Social Connections: Not on file   Intimate Partner Violence: Unknown (11/20/2024)    Nursing IPS     Feels Physically and Emotionally Safe: Not on file     Physically Hurt by Someone: Not on file     Humiliated or Emotionally Abused by  Someone: Not on file     Physically Hurt by Someone: No     Hurt or Threatened by Someone: No   Housing Stability: Unknown (2024)    Nursing: Inadequate Housing Risk Classification     Has Housing: Not on file     Worried About Losing Housing: Not on file     Unable to Get Utilities: Not on file     Unable to Pay for Housing in the Last Year: No     Has Housin      No family history on file.  No past surgical history on file.    Current Outpatient Medications:     aspirin (Aspirin Low Dose) 81 mg EC tablet, Take 1 tablet (81 mg total) by mouth daily, Disp: 90 tablet, Rfl: 0    atorvastatin (LIPITOR) 40 mg tablet, Take 1 tablet (40 mg total) by mouth daily at bedtime, Disp: 90 tablet, Rfl: 4    carvedilol (COREG) 6.25 mg tablet, Take 1 tablet (6.25 mg total) by mouth 2 (two) times a day with meals, Disp: 180 tablet, Rfl: 1    Empagliflozin (Jardiance) 10 MG TABS tablet, Take 1 tablet (10 mg total) by mouth every morning, Disp: 30 tablet, Rfl: 6    fluticasone (FLONASE) 50 mcg/act nasal spray, 1 spray into each nostril daily, Disp: 16 g, Rfl: 0    melatonin 3 mg, Take 1 tablet (3 mg total) by mouth daily at bedtime, Disp: 15 tablet, Rfl: 0    nicotine (NICODERM CQ) 21 mg/24 hr TD 24 hr patch, Place 1 patch on the skin over 24 hours every 24 hours, Disp: 30 patch, Rfl: 5    sacubitril-valsartan (ENTRESTO) 24-26 MG TABS, Take 1 tablet by mouth 2 (two) times a day, Disp: 60 tablet, Rfl: 1  Allergies   Allergen Reactions    Pollen Extract Itching    Ioversol Rash       Labs:     Chemistry        Component Value Date/Time    K 4.7 2024 1410     2024 1410    CO2 30 2024 1410    BUN 12 2024 1410    CREATININE 0.93 2024 1410        Component Value Date/Time    CALCIUM 9.2 2024 1410    ALKPHOS 83 2024 1246    AST 25 2024 1246    ALT 23 2024 1246          Lab Results   Component Value Date    HDL 59 2024    HDL 42 2021    HDL 37 (L) 2019      Lab Results   Component Value Date    LDLCALC 92 01/30/2024    LDLCALC 132 (H) 05/20/2021    LDLCALC 82 01/11/2019     Lab Results   Component Value Date    TRIG 125 01/30/2024    TRIG 205 (H) 05/20/2021    TRIG 142 01/11/2019     Imaging: XR chest pa and lateral  Result Date: 11/24/2024  Narrative: XR CHEST PA AND LATERAL INDICATION: CHF. COMPARISON: Chest radiograph 11/20/2024 FINDINGS: There is prominence of the interstitial markings. There is a left basilar opacity. There is mild blunting of the bilateral costophrenic angles. No pneumothorax. . Normal cardiomediastinal silhouette. Bones are unremarkable for age. Normal upper abdomen.     Impression: 1. Prominence of the interstitial markings suggesting prominent pulmonary interstitial edema. 2. Retrocardiac opacity may represent atelectasis versus pneumonia. Clinical and laboratory correlation is recommended. 3. Small bilateral pleural effusions. Workstation performed: [id number]     Echo complete w/ contrast if indicated  Result Date: 11/22/2024  Narrative:   Left Ventricle: Left ventricular cavity size is normal. Wall thickness is normal. The left ventricular ejection fraction is 27% by visual estimation. Systolic function is severely reduced. Global longitudinal strain is reduced at -9%.  Strain shows some degree of apical sparing.  Can consider additional workup as clinically indicated. There is severe global hypokinesis with regional variation. Diastolic function is severely abnormal, consistent with ungraded restrictive relaxation.  Left atrial filling pressure is elevated.   Right Ventricle: Right ventricular cavity size is normal. Systolic function is low normal.   Left Atrium: The atrium is severely dilated.   Aortic Valve: There is trace regurgitation.   Mitral Valve: There is mild annular calcification. There is mild regurgitation.   Tricuspid Valve: There is mild to moderate regurgitation. Pulmonary artery systolic pressures are estimated at 59  "mmHg.   There is no study for comparison.     XR chest 2 views  Result Date: 11/20/2024  Narrative: AP CHEST RADIOGRAPH CTA - CHEST WITH IV CONTRAST - PULMONARY ANGIOGRAM INDICATION: cp,sob, elevated d-dimer. COMPARISON: Chest radiograph dated 11/20/2024 TECHNIQUE: Initially, semiupright AP chest radiograph was performed. Subsequently, CTA examination of the chest was performed using angiographic technique according to a protocol specifically tailored to evaluate for pulmonary embolism. Multiplanar 2D reformatted images were created from the source data. In addition, coronal 3D MIP postprocessing was performed on the acquisition scanner. Radiation dose length product (DLP) for this visit: CT--406 mGy-cm  (accession [id number]), XR--0 mGy  (accession [id number]). This examination, like all CT scans performed in the [facility], was performed utilizing techniques to minimize radiation dose exposure, including the use of iterative reconstruction and automated exposure control. IV Contrast: 80 mL of iohexol (OMNIPAQUE) FINDINGS: --CHEST RADIOGRAPH-- Mild prominence of the cardiomediastinal silhouette even when accounting for portable technique. Pulmonary vasculature is engorged and indistinct. Generalized increase in peripheral reticular lung markings (Kerley B lines). Patchy central-predominant opacities in both lungs. No radiographic effusions evident, though present on subsequent CT. No pneumothorax. Bones are unremarkable. --PULMONARY CTA-- PULMONARY ARTERIAL TREE: Central pulmonary arteries are normal in caliber. No pulmonary emboli demonstrated. \"Supine cephalization\" with disproportionate prominence of the anterior pulmonary vasculature. LUNGS: Reticular interlobular septal thickening and central-predominant symmetric groundglass opacities in keeping with interstitial and alveolar edema, respectively. No focal pneumonic consolidation. Mild diffuse airway thickening also attributable to third spacing. " No concerning endobronchial lesions. No suspicious pulmonary nodules or masses. PLEURA: Moderate bilateral layering pleural effusions without loculated components. HEART/GREAT VESSELS: Mild four-chamber enlargement of the heart. No pericardial effusion. No thoracic aortic aneurysm. MEDIASTINUM AND ESTELA: Unremarkable. CHEST WALL AND LOWER NECK: Unremarkable. VISUALIZED STRUCTURES IN THE UPPER ABDOMEN: Question gallstone at the proximal body on lower-most image without visualized findings concerning for cholecystitis. No acute findings in the imaged portion of the upper abdomen. OSSEOUS STRUCTURES: Old, healed posterior left rib fractures. No acute fractures or destructive osseous lesions.     Impression: 1.  Constellation of findings suggesting volume overload with bilateral moderate pleural effusions, pulmonary vascular congestion, and interstitial and alveolar edema. 2.  No pulmonary emboli. Workstation performed: [id number]     CTA chest pe study  Result Date: 11/20/2024  Narrative: AP CHEST RADIOGRAPH CTA - CHEST WITH IV CONTRAST - PULMONARY ANGIOGRAM INDICATION: cp,sob, elevated d-dimer. COMPARISON: Chest radiograph dated 11/20/2024 TECHNIQUE: Initially, semiupright AP chest radiograph was performed. Subsequently, CTA examination of the chest was performed using angiographic technique according to a protocol specifically tailored to evaluate for pulmonary embolism. Multiplanar 2D reformatted images were created from the source data. In addition, coronal 3D MIP postprocessing was performed on the acquisition scanner. Radiation dose length product (DLP) for this visit: CT--406 mGy-cm  (accession [id number]), XR--0 mGy  (accession [id number]). This examination, like all CT scans performed in the [facility], was performed utilizing techniques to minimize radiation dose exposure, including the use of iterative reconstruction and automated exposure control. IV Contrast: 80 mL of iohexol (OMNIPAQUE)  "FINDINGS: --CHEST RADIOGRAPH-- Mild prominence of the cardiomediastinal silhouette even when accounting for portable technique. Pulmonary vasculature is engorged and indistinct. Generalized increase in peripheral reticular lung markings (Kerley B lines). Patchy central-predominant opacities in both lungs. No radiographic effusions evident, though present on subsequent CT. No pneumothorax. Bones are unremarkable. --PULMONARY CTA-- PULMONARY ARTERIAL TREE: Central pulmonary arteries are normal in caliber. No pulmonary emboli demonstrated. \"Supine cephalization\" with disproportionate prominence of the anterior pulmonary vasculature. LUNGS: Reticular interlobular septal thickening and central-predominant symmetric groundglass opacities in keeping with interstitial and alveolar edema, respectively. No focal pneumonic consolidation. Mild diffuse airway thickening also attributable to third spacing. No concerning endobronchial lesions. No suspicious pulmonary nodules or masses. PLEURA: Moderate bilateral layering pleural effusions without loculated components. HEART/GREAT VESSELS: Mild four-chamber enlargement of the heart. No pericardial effusion. No thoracic aortic aneurysm. MEDIASTINUM AND ESTELA: Unremarkable. CHEST WALL AND LOWER NECK: Unremarkable. VISUALIZED STRUCTURES IN THE UPPER ABDOMEN: Question gallstone at the proximal body on lower-most image without visualized findings concerning for cholecystitis. No acute findings in the imaged portion of the upper abdomen. OSSEOUS STRUCTURES: Old, healed posterior left rib fractures. No acute fractures or destructive osseous lesions.     Impression: 1.  Constellation of findings suggesting volume overload with bilateral moderate pleural effusions, pulmonary vascular congestion, and interstitial and alveolar edema. 2.  No pulmonary emboli. Workstation performed: [id number]     XR chest 1 view portable  Result Date: 11/13/2024  Narrative: XR CHEST PORTABLE INDICATION: cough. " "COMPARISON: Chest radiograph December 2, 2019 FINDINGS: New bilateral perihilar and peripheral reticular opacities with more focal opacity in the right lower lung. No pneumothorax or pleural effusion. Normal cardiomediastinal silhouette. Chronic right rib fractures. No acute osseous abnormality. Normal upper abdomen.     Impression: Pulmonary edema and/or pneumonia. Patient's smoking history is noted, and follow-up noncontrast chest is advised in 3 months. The study was marked in EPIC for immediate notification. Workstation performed: [id number]       Review of Systems   Constitutional: Negative for chills, diaphoresis, fever, malaise/fatigue and weight gain.   Cardiovascular:  Negative for chest pain, dyspnea on exertion, irregular heartbeat, leg swelling, near-syncope, orthopnea, palpitations, paroxysmal nocturnal dyspnea and syncope.   Respiratory:  Negative for cough, shortness of breath, sleep disturbances due to breathing, snoring and wheezing.    Skin:  Negative for rash.   Gastrointestinal:  Negative for bloating, abdominal pain and nausea.   Neurological:  Negative for dizziness and light-headedness.       Vitals:    01/20/25 1012   BP: 130/74   Pulse: 96   SpO2: 100%       Vitals:    01/20/25 1012   Weight: 67.4 kg (148 lb 9.6 oz)       Height: 5' 2\" (157.5 cm)   Body mass index is 27.18 kg/m².    Physical Exam  Vitals and nursing note reviewed.   Constitutional:       General: He is not in acute distress.     Appearance: Normal appearance. He is not ill-appearing.   HENT:      Head: Normocephalic and atraumatic.      Nose: Nose normal.      Mouth/Throat:      Mouth: Mucous membranes are moist.   Eyes:      Conjunctiva/sclera: Conjunctivae normal.   Cardiovascular:      Rate and Rhythm: Normal rate and regular rhythm.      Pulses:           Radial pulses are 2+ on the right side and 2+ on the left side.      Heart sounds: S1 normal and S2 normal. Murmur heard.      Systolic murmur is present with a grade " of 2/6.   Pulmonary:      Effort: Pulmonary effort is normal. No respiratory distress.      Breath sounds: Normal breath sounds. No stridor. No wheezing, rhonchi or rales.   Abdominal:      General: There is no distension.   Musculoskeletal:      Cervical back: Neck supple.      Right lower leg: No edema.      Left lower leg: No edema.   Skin:     General: Skin is warm.      Capillary Refill: Capillary refill takes less than 2 seconds.   Neurological:      General: No focal deficit present.      Mental Status: He is alert.   Psychiatric:         Thought Content: Thought content normal.

## 2025-01-13 NOTE — ASSESSMENT & PLAN NOTE
Orders:    nicotine (NICODERM CQ) 21 mg/24 hr TD 24 hr patch; Place 1 patch on the skin over 24 hours every 24 hours

## 2025-01-13 NOTE — ASSESSMENT & PLAN NOTE
-continue lipitor 40 mg qd      Orders:    atorvastatin (LIPITOR) 40 mg tablet; Take 1 tablet (40 mg total) by mouth daily at bedtime

## 2025-01-13 NOTE — PROGRESS NOTES
Name: Ruben Flores      : 1955      MRN: [id number]  Encounter Provider: Yanira Dickey MD  Encounter Date: 2025   Encounter department: [facility]  :  Assessment & Plan  Type 2 diabetes mellitus without complication, without long-term current use of insulin (HCC)    Lab Results   Component Value Date    HGBA1C 6.9 (H) 2024     Continue Jardiance 10 mg qd  F/U A1c in 3 months   Continue diabetic diet                Mixed hyperlipidemia  -continue lipitor 40 mg qd      Orders:    atorvastatin (LIPITOR) 40 mg tablet; Take 1 tablet (40 mg total) by mouth daily at bedtime    Chronic combined systolic and diastolic congestive heart failure (HCC)  Wt Readings from Last 3 Encounters:   25 73.1 kg (161 lb 3.2 oz)   24 67.9 kg (149 lb 12.8 oz)   24 68 kg (150 lb)     -Continue Aspirin 81 mg qd, coreg 6.25 mg qd, Continue entresto 24-26 mg bid  -Continue low sodium diet   -has f/u appointment w/ cardiology 25          Orders:    carvedilol (COREG) 6.25 mg tablet; Take 1 tablet (6.25 mg total) by mouth 2 (two) times a day with meals    Empagliflozin (Jardiance) 10 MG TABS tablet; Take 1 tablet (10 mg total) by mouth every morning    Tobacco user    Orders:    nicotine (NICODERM CQ) 21 mg/24 hr TD 24 hr patch; Place 1 patch on the skin over 24 hours every 24 hours    Primary hypertension    Orders:    Albumin / creatinine urine ratio; Future    Prostate cancer screening  As per patient request.  He denies unintentional  weight loss, urinary strain or sensation of full bladder  Patient denies family history of prostate cancer   Orders:    PSA, Total Screen; Future    Encounter for immunization    Orders:    Pneumococcal Conjugate Vaccine 20-valent (Pcv20)    Zoster Vaccine Recombinant IM           History of Present Illness     68 yo male with a pmh of chf, diabetes type 2 and HLD presents to follow up on his chronic conditions.  He  "reports compliance to medication regimen and a diet low in carbs and salt.  Patient agreed to obtaining his shingles and pneumococcal 20 vaccine on today's encounter.  Unfortunately, our office is currently not providing the RSV vaccine but do to patient's chronic conditions he was advised to get RSV vaccination at his local pharmacy.  He denies chest pain, sob, abdominal pain, n/v/d/c      Review of Systems   Constitutional:  Negative for chills and fever.   HENT:  Negative for ear pain and sore throat.    Eyes:  Negative for pain and visual disturbance.   Respiratory:  Negative for cough and shortness of breath.    Cardiovascular:  Negative for chest pain and palpitations.   Gastrointestinal:  Negative for abdominal pain and vomiting.   Genitourinary:  Negative for dysuria and hematuria.   Musculoskeletal:  Negative for arthralgias and back pain.   Skin:  Negative for color change and rash.   Neurological:  Negative for seizures and syncope.   All other systems reviewed and are negative.      Objective   /76 (BP Location: Right arm, Patient Position: Sitting, Cuff Size: Standard)   Pulse 96   Temp 97.5 °F (36.4 °C) (Temporal)   Resp 18   Ht 5' 2\" (1.575 m)   Wt 73.1 kg (161 lb 3.2 oz)   SpO2 99%   BMI 29.48 kg/m²      Physical Exam  Vitals and nursing note reviewed.   Constitutional:       General: He is not in acute distress.     Appearance: He is well-developed.   HENT:      Head: Normocephalic and atraumatic.   Eyes:      Conjunctiva/sclera: Conjunctivae normal.   Cardiovascular:      Rate and Rhythm: Normal rate and regular rhythm.      Heart sounds: No murmur heard.  Pulmonary:      Effort: Pulmonary effort is normal. No respiratory distress.      Breath sounds: Normal breath sounds.   Abdominal:      Palpations: Abdomen is soft.      Tenderness: There is no abdominal tenderness.   Musculoskeletal:         General: No swelling.      Cervical back: Neck supple.      Right lower leg: No edema.      " Left lower leg: No edema.   Skin:     General: Skin is warm and dry.      Capillary Refill: Capillary refill takes less than 2 seconds.   Neurological:      Mental Status: He is alert.   Psychiatric:         Mood and Affect: Mood normal.

## 2025-01-13 NOTE — ASSESSMENT & PLAN NOTE
Lab Results   Component Value Date    HGBA1C 6.9 (H) 11/20/2024     Continue Jardiance 10 mg qd  F/U A1c in 3 months   Continue diabetic diet

## 2025-01-14 DIAGNOSIS — Z12.5 PROSTATE CANCER SCREENING: Primary | ICD-10-CM

## 2025-01-20 ENCOUNTER — OFFICE VISIT (OUTPATIENT)
Dept: CARDIOLOGY CLINIC | Facility: CLINIC | Age: 70
End: 2025-01-20
Payer: COMMERCIAL

## 2025-01-20 VITALS
HEART RATE: 96 BPM | HEIGHT: 62 IN | BODY MASS INDEX: 27.34 KG/M2 | WEIGHT: 148.6 LBS | DIASTOLIC BLOOD PRESSURE: 74 MMHG | SYSTOLIC BLOOD PRESSURE: 130 MMHG | OXYGEN SATURATION: 100 %

## 2025-01-20 DIAGNOSIS — Z72.0 TOBACCO USER: ICD-10-CM

## 2025-01-20 DIAGNOSIS — E78.2 MIXED HYPERLIPIDEMIA: ICD-10-CM

## 2025-01-20 DIAGNOSIS — I50.42 CHRONIC COMBINED SYSTOLIC AND DIASTOLIC CONGESTIVE HEART FAILURE (HCC): Primary | ICD-10-CM

## 2025-01-20 DIAGNOSIS — I10 PRIMARY HYPERTENSION: ICD-10-CM

## 2025-01-20 DIAGNOSIS — I10 ESSENTIAL HYPERTENSION: ICD-10-CM

## 2025-01-20 PROCEDURE — 99214 OFFICE O/P EST MOD 30 MIN: CPT

## 2025-01-22 DIAGNOSIS — I50.42 CHRONIC COMBINED SYSTOLIC AND DIASTOLIC CONGESTIVE HEART FAILURE (HCC): ICD-10-CM

## 2025-01-22 RX ORDER — CARVEDILOL 6.25 MG/1
6.25 TABLET ORAL 2 TIMES DAILY WITH MEALS
Qty: 180 TABLET | Refills: 1 | Status: SHIPPED | OUTPATIENT
Start: 2025-01-22

## 2025-01-23 ENCOUNTER — APPOINTMENT (EMERGENCY)
Dept: RADIOLOGY | Facility: HOSPITAL | Age: 70
End: 2025-01-23
Payer: COMMERCIAL

## 2025-01-23 ENCOUNTER — TELEPHONE (OUTPATIENT)
Age: 70
End: 2025-01-23

## 2025-01-23 ENCOUNTER — HOSPITAL ENCOUNTER (EMERGENCY)
Facility: HOSPITAL | Age: 70
Discharge: HOME | End: 2025-01-23
Attending: EMERGENCY MEDICINE
Payer: COMMERCIAL

## 2025-01-23 ENCOUNTER — OFFICE VISIT (OUTPATIENT)
Dept: FAMILY MEDICINE CLINIC | Facility: CLINIC | Age: 70
End: 2025-01-23

## 2025-01-23 VITALS
OXYGEN SATURATION: 95 % | SYSTOLIC BLOOD PRESSURE: 102 MMHG | HEART RATE: 94 BPM | BODY MASS INDEX: 27.74 KG/M2 | TEMPERATURE: 98.7 F | DIASTOLIC BLOOD PRESSURE: 51 MMHG | WEIGHT: 151.68 LBS | RESPIRATION RATE: 18 BRPM

## 2025-01-23 VITALS
WEIGHT: 150.8 LBS | RESPIRATION RATE: 14 BRPM | SYSTOLIC BLOOD PRESSURE: 120 MMHG | TEMPERATURE: 97.9 F | OXYGEN SATURATION: 97 % | BODY MASS INDEX: 27.75 KG/M2 | HEART RATE: 67 BPM | HEIGHT: 62 IN | DIASTOLIC BLOOD PRESSURE: 70 MMHG

## 2025-01-23 DIAGNOSIS — R07.9 CHEST PAIN: Primary | ICD-10-CM

## 2025-01-23 DIAGNOSIS — R97.20 ELEVATED PSA: Primary | ICD-10-CM

## 2025-01-23 DIAGNOSIS — R35.0 URINARY FREQUENCY: ICD-10-CM

## 2025-01-23 LAB
2HR DELTA HS TROPONIN: -2 NG/L
ALBUMIN SERPL BCG-MCNC: 4.2 G/DL (ref 3.5–5)
ALP SERPL-CCNC: 128 U/L (ref 34–104)
ALT SERPL W P-5'-P-CCNC: 19 U/L (ref 7–52)
ANION GAP SERPL CALCULATED.3IONS-SCNC: 9 MMOL/L (ref 4–13)
AST SERPL W P-5'-P-CCNC: 27 U/L (ref 13–39)
ATRIAL RATE: 94 BPM
BASOPHILS # BLD AUTO: 0.04 THOUSANDS/ΜL (ref 0–0.1)
BASOPHILS NFR BLD AUTO: 0 % (ref 0–1)
BILIRUB SERPL-MCNC: 0.4 MG/DL (ref 0.2–1)
BNP SERPL-MCNC: 95 PG/ML (ref 0–100)
BUN SERPL-MCNC: 11 MG/DL (ref 5–25)
CALCIUM SERPL-MCNC: 9.4 MG/DL (ref 8.4–10.2)
CARDIAC TROPONIN I PNL SERPL HS: 10 NG/L (ref ?–50)
CARDIAC TROPONIN I PNL SERPL HS: 12 NG/L (ref ?–50)
CHLORIDE SERPL-SCNC: 94 MMOL/L (ref 96–108)
CO2 SERPL-SCNC: 31 MMOL/L (ref 21–32)
CREAT SERPL-MCNC: 0.86 MG/DL (ref 0.6–1.3)
EOSINOPHIL # BLD AUTO: 0.42 THOUSAND/ΜL (ref 0–0.61)
EOSINOPHIL NFR BLD AUTO: 4 % (ref 0–6)
ERYTHROCYTE [DISTWIDTH] IN BLOOD BY AUTOMATED COUNT: 13.1 % (ref 11.6–15.1)
GFR SERPL CREATININE-BSD FRML MDRD: 89 ML/MIN/1.73SQ M
GLUCOSE SERPL-MCNC: 132 MG/DL (ref 65–140)
HCT VFR BLD AUTO: 43.2 % (ref 36.5–49.3)
HGB BLD-MCNC: 14.4 G/DL (ref 12–17)
IMM GRANULOCYTES # BLD AUTO: 0.04 THOUSAND/UL (ref 0–0.2)
IMM GRANULOCYTES NFR BLD AUTO: 0 % (ref 0–2)
LYMPHOCYTES # BLD AUTO: 2.07 THOUSANDS/ΜL (ref 0.6–4.47)
LYMPHOCYTES NFR BLD AUTO: 21 % (ref 14–44)
MCH RBC QN AUTO: 28.3 PG (ref 26.8–34.3)
MCHC RBC AUTO-ENTMCNC: 33.3 G/DL (ref 31.4–37.4)
MCV RBC AUTO: 85 FL (ref 82–98)
MONOCYTES # BLD AUTO: 0.92 THOUSAND/ΜL (ref 0.17–1.22)
MONOCYTES NFR BLD AUTO: 9 % (ref 4–12)
NEUTROPHILS # BLD AUTO: 6.26 THOUSANDS/ΜL (ref 1.85–7.62)
NEUTS SEG NFR BLD AUTO: 66 % (ref 43–75)
NRBC BLD AUTO-RTO: 0 /100 WBCS
P AXIS: 70 DEGREES
PLATELET # BLD AUTO: 250 THOUSANDS/UL (ref 149–390)
PMV BLD AUTO: 9 FL (ref 8.9–12.7)
POTASSIUM SERPL-SCNC: 4 MMOL/L (ref 3.5–5.3)
PR INTERVAL: 132 MS
PROT SERPL-MCNC: 8.1 G/DL (ref 6.4–8.4)
QRS AXIS: -5 DEGREES
QRSD INTERVAL: 88 MS
QT INTERVAL: 378 MS
QTC INTERVAL: 473 MS
RBC # BLD AUTO: 5.09 MILLION/UL (ref 3.88–5.62)
SL AMB  POCT GLUCOSE, UA: NORMAL
SL AMB LEUKOCYTE ESTERASE,UA: NEGATIVE
SL AMB POCT BILIRUBIN,UA: NEGATIVE
SL AMB POCT BLOOD,UA: NORMAL
SL AMB POCT KETONES,UA: NORMAL
SL AMB POCT NITRITE,UA: NEGATIVE
SL AMB POCT PH,UA: 6
SL AMB POCT SPECIFIC GRAVITY,UA: 1.01
SL AMB POCT URINE PROTEIN: NEGATIVE
SL AMB POCT UROBILINOGEN: NORMAL
SODIUM SERPL-SCNC: 134 MMOL/L (ref 135–147)
T WAVE AXIS: 186 DEGREES
VENTRICULAR RATE: 94 BPM
WBC # BLD AUTO: 9.75 THOUSAND/UL (ref 4.31–10.16)

## 2025-01-23 PROCEDURE — 81002 URINALYSIS NONAUTO W/O SCOPE: CPT

## 2025-01-23 PROCEDURE — 99285 EMERGENCY DEPT VISIT HI MDM: CPT

## 2025-01-23 PROCEDURE — 99285 EMERGENCY DEPT VISIT HI MDM: CPT | Performed by: EMERGENCY MEDICINE

## 2025-01-23 PROCEDURE — 3078F DIAST BP <80 MM HG: CPT

## 2025-01-23 PROCEDURE — 36415 COLL VENOUS BLD VENIPUNCTURE: CPT

## 2025-01-23 PROCEDURE — 80053 COMPREHEN METABOLIC PANEL: CPT

## 2025-01-23 PROCEDURE — 93005 ELECTROCARDIOGRAM TRACING: CPT

## 2025-01-23 PROCEDURE — 84484 ASSAY OF TROPONIN QUANT: CPT

## 2025-01-23 PROCEDURE — 3074F SYST BP LT 130 MM HG: CPT

## 2025-01-23 PROCEDURE — G2211 COMPLEX E/M VISIT ADD ON: HCPCS

## 2025-01-23 PROCEDURE — 99213 OFFICE O/P EST LOW 20 MIN: CPT

## 2025-01-23 PROCEDURE — 83880 ASSAY OF NATRIURETIC PEPTIDE: CPT

## 2025-01-23 PROCEDURE — 71046 X-RAY EXAM CHEST 2 VIEWS: CPT

## 2025-01-23 PROCEDURE — 93010 ELECTROCARDIOGRAM REPORT: CPT | Performed by: STUDENT IN AN ORGANIZED HEALTH CARE EDUCATION/TRAINING PROGRAM

## 2025-01-23 PROCEDURE — 85025 COMPLETE CBC W/AUTO DIFF WBC: CPT

## 2025-01-23 NOTE — ED PROVIDER NOTES
Time reflects when diagnosis was documented in both MDM as applicable and the Disposition within this note       Time User Action Codes Description Comment    1/23/2025  7:29 AM Evelyn Mark Add [R07.9] Chest pain           ED Disposition       None          Assessment & Plan         Medical Decision Making  Patient presents for evaluation of chest pain.  On arrival he is hemodynamically stable and in no acute distress.  Differential diagnosis includes but is not limited to chest wall pain, costochondritis, pericarditis, myocarditis, ACS, MI, CHF exacerbation, pleural effusion, or pneumonia.  On my personal interpretation of the EKG, the patient is in a normal sinus rhythm with T wave inversions in leads V4-V6.  The T wave inversions are new compared to most recent EKG in 11/2024, however he did have similar inversions on prior EKGs.  Initial troponin was 12.  Delta troponin is pending.  Patient signed out to Dr. Renner for final disposition.    Amount and/or Complexity of Data Reviewed  Labs: ordered. Decision-making details documented in ED Course.  Radiology: ordered.        ED Course as of 01/23/25 0729   Thu Jan 23, 2025   0627 hs TnI 0hr: 12       Medications - No data to display    ED Risk Strat Scores   HEART Risk Score      Flowsheet Row Most Recent Value   Heart Score Risk Calculator    History 0 Filed at: 01/23/2025 0729   ECG 1 Filed at: 01/23/2025 0729   Age 2 Filed at: 01/23/2025 0729   Risk Factors 2 Filed at: 01/23/2025 0729   Troponin 0 Filed at: 01/23/2025 0729   HEART Score 5 Filed at: 01/23/2025 0729          HEART Risk Score      Flowsheet Row Most Recent Value   Heart Score Risk Calculator    History 0 Filed at: 01/23/2025 0729   ECG 1 Filed at: 01/23/2025 0729   Age 2 Filed at: 01/23/2025 0729   Risk Factors 2 Filed at: 01/23/2025 0729   Troponin 0 Filed at: 01/23/2025 0729   HEART Score 5 Filed at: 01/23/2025 0729          SBIRT 20yo+      Flowsheet Row Most Recent Value   Initial Alcohol  Screen: US AUDIT-C     1. How often do you have a drink containing alcohol? 0 Filed at: 01/23/2025 0531   2. How many drinks containing alcohol do you have on a typical day you are drinking?  0 Filed at: 01/23/2025 0531   3b. FEMALE Any Age, or MALE 65+: How often do you have 4 or more drinks on one occassion? 0 Filed at: 01/23/2025 0531   Audit-C Score 0 Filed at: 01/23/2025 0531   KALINA: How many times in the past year have you...    Used an illegal drug or used a prescription medication for non-medical reasons? Never Filed at: 01/23/2025 0531              History of Present Illness       Chief Complaint   Patient presents with    Chest Pain     Started last night. Could not sleep because he was having palpitations. Was admitted a few weeks ago for cardiac issues and this feels the same. Denies SOB or headache.        Past Medical History:   Diagnosis Date    HLD (hyperlipidemia)     Hypertension       History reviewed. No pertinent surgical history.   History reviewed. No pertinent family history.   Social History     Tobacco Use    Smoking status: Former     Types: Cigarettes     Start date: 2024     Passive exposure: Current    Smokeless tobacco: Former   Vaping Use    Vaping status: Former    Substances: Flavoring   Substance Use Topics    Alcohol use: Not Currently    Drug use: Not Currently     Comment: past use of Cocaine      E-Cigarette/Vaping    E-Cigarette Use Former User       E-Cigarette/Vaping Substances    Nicotine No     THC No     CBD No     Flavoring Yes     Other No     Unknown No       I have reviewed and agree with the history as documented.     The patient is a 69 y.o. male with a past medical history of hypertension, CHF, hyperlipidemia, and diabetes mellitus, who presents for evaluation of chest pain.  He reports several hours of chest pain that began several hours ago while at rest.  He explains it feels like his chest is pounding.  Associated symptoms include a mild headache.  No  lightheadedness, dizziness, shortness of breath, abdominal pain, nausea, vomiting, diarrhea, recent cold-like symptoms, leg swelling, leg pain, or F/C.  He reports compliance with his outpatient medications.      History provided by:  Patient and medical records   used: Yes        Review of Systems   Constitutional:  Negative for chills and fever.   HENT:  Negative for congestion, ear pain, rhinorrhea and sore throat.    Eyes:  Negative for pain and visual disturbance.   Respiratory:  Negative for cough, chest tightness, shortness of breath and wheezing.    Cardiovascular:  Positive for chest pain. Negative for palpitations.   Gastrointestinal:  Negative for abdominal pain, diarrhea, nausea and vomiting.   Genitourinary:  Negative for dysuria and hematuria.   Musculoskeletal:  Negative for arthralgias, back pain and myalgias.   Skin:  Negative for color change and rash.   Neurological:  Positive for headaches. Negative for dizziness, seizures, syncope, weakness, light-headedness and numbness.   All other systems reviewed and are negative.      Objective       ED Triage Vitals [01/23/25 0534]   Temperature Pulse Blood Pressure Respirations SpO2 Patient Position - Orthostatic VS   98.7 °F (37.1 °C) 93 144/80 18 100 % Lying      Temp Source Heart Rate Source BP Location FiO2 (%) Pain Score    Oral Monitor Left arm -- --      Vitals      Date and Time Temp Pulse SpO2 Resp BP Pain Score FACES Pain Rating User   01/23/25 0534 98.7 °F (37.1 °C) 93 100 % 18 144/80 -- -- KLL            Physical Exam  Vitals and nursing note reviewed.   Constitutional:       General: He is awake. He is not in acute distress.     Appearance: Normal appearance. He is well-developed and overweight. He is not toxic-appearing or diaphoretic.   HENT:      Head: Normocephalic and atraumatic.      Right Ear: External ear normal.      Left Ear: External ear normal.      Nose: Nose normal.      Mouth/Throat:      Lips: Pink.       Mouth: Mucous membranes are moist.      Pharynx: Oropharynx is clear. Uvula midline.   Eyes:      General: Lids are normal. Vision grossly intact. Gaze aligned appropriately.      Conjunctiva/sclera: Conjunctivae normal.      Pupils: Pupils are equal, round, and reactive to light.   Cardiovascular:      Rate and Rhythm: Normal rate and regular rhythm.      Heart sounds: S1 normal and S2 normal. No murmur heard.     No friction rub. No gallop.   Pulmonary:      Effort: Pulmonary effort is normal. No tachypnea or respiratory distress.      Breath sounds: Normal breath sounds and air entry. No stridor or transmitted upper airway sounds. No decreased breath sounds, wheezing, rhonchi or rales.   Chest:      Chest wall: No deformity, swelling, tenderness, crepitus or edema.   Breasts:     Right: Normal.      Left: Normal.      Comments: No reproducible chest wall tenderness  Abdominal:      General: Abdomen is flat. Bowel sounds are normal. There is no distension.      Palpations: Abdomen is soft. There is no mass.      Tenderness: There is no abdominal tenderness. There is no right CVA tenderness, left CVA tenderness, guarding or rebound.   Musculoskeletal:      Cervical back: Normal, full passive range of motion without pain and neck supple. No rigidity or crepitus. No spinous process tenderness or muscular tenderness.      Thoracic back: Normal. No spasms, tenderness or bony tenderness.      Lumbar back: Normal. No spasms, tenderness or bony tenderness.      Right lower leg: No edema.      Left lower leg: No edema.   Lymphadenopathy:      Head:      Right side of head: No submental, submandibular, tonsillar, preauricular or posterior auricular adenopathy.      Left side of head: No submental, submandibular, tonsillar, preauricular or posterior auricular adenopathy.      Cervical: No cervical adenopathy.   Skin:     General: Skin is warm and dry.      Capillary Refill: Capillary refill takes less than 2 seconds.       Coloration: Skin is not pale.      Findings: No rash.   Neurological:      Mental Status: He is alert and oriented to person, place, and time.   Psychiatric:         Attention and Perception: Attention normal.         Mood and Affect: Mood normal.         Speech: Speech normal.         Behavior: Behavior normal. Behavior is cooperative.         Results Reviewed       Procedure Component Value Units Date/Time    HS Troponin I 2hr [[id number]]     Lab Status: No result Specimen: Blood     HS Troponin 0hr (reflex protocol) [[id number]]  (Normal) Collected: 01/23/25 0544    Lab Status: Final result Specimen: Blood from Arm, Right Updated: 01/23/25 0622     hs TnI 0hr 12 ng/L     Comprehensive metabolic panel [[id number]]  (Abnormal) Collected: 01/23/25 0544    Lab Status: Final result Specimen: Blood from Arm, Right Updated: 01/23/25 0621     Sodium 134 mmol/L      Potassium 4.0 mmol/L      Chloride 94 mmol/L      CO2 31 mmol/L      ANION GAP 9 mmol/L      BUN 11 mg/dL      Creatinine 0.86 mg/dL      Glucose 132 mg/dL      Calcium 9.4 mg/dL      AST 27 U/L      ALT 19 U/L      Alkaline Phosphatase 128 U/L      Total Protein 8.1 g/dL      Albumin 4.2 g/dL      Total Bilirubin 0.40 mg/dL      eGFR 89 ml/min/1.73sq m     Narrative:      National Kid[employer] Disease Foundation guidelines for Chronic Kidney Disease (CKD):     Stage 1 with normal or high GFR (GFR > 90 mL/min/1.73 square meters)    Stage 2 Mild CKD (GFR = 60-89 mL/min/1.73 square meters)    Stage 3A Moderate CKD (GFR = 45-59 mL/min/1.73 square meters)    Stage 3B Moderate CKD (GFR = 30-44 mL/min/1.73 square meters)    Stage 4 Severe CKD (GFR = 15-29 mL/min/1.73 square meters)    Stage 5 End Stage CKD (GFR <15 mL/min/1.73 square meters)  Note: GFR calculation is accurate only with a steady state creatinine    CBC and differential [[id number]] Collected: 01/23/25 0544    Lab Status: Final result Specimen: Blood from Arm, Right Updated: 01/23/25 0555     WBC 9.75  Thousand/uL      RBC 5.09 Million/uL      Hemoglobin 14.4 g/dL      Hematocrit 43.2 %      MCV 85 fL      MCH 28.3 pg      MCHC 33.3 g/dL      RDW 13.1 %      MPV 9.0 fL      Platelets 250 Thousands/uL      nRBC 0 /100 WBCs      Segmented % 66 %      Immature Grans % 0 %      Lymphocytes % 21 %      Monocytes % 9 %      Eosinophils Relative 4 %      Basophils Relative 0 %      Absolute Neutrophils 6.26 Thousands/µL      Absolute Immature Grans 0.04 Thousand/uL      Absolute Lymphocytes 2.07 Thousands/µL      Absolute Monocytes 0.92 Thousand/µL      Eosinophils Absolute 0.42 Thousand/µL      Basophils Absolute 0.04 Thousands/µL     B-Type Natriuretic Peptide(BNP) [[id number]] Collected: 01/23/25 0544    Lab Status: In process Specimen: Blood from Arm, Right Updated: 01/23/25 0553            XR chest 2 views    (Results Pending)       ECG 12 Lead Documentation Only    Date/Time: 1/23/2025 5:53 AM    Performed by: Evelyn Mark PA-C  Authorized by: Evelyn Mark PA-C    ECG reviewed by me, the ED Provider: yes    Patient location:  ED  Previous ECG:     Previous ECG:  Compared to current    Comparison ECG info:  11/20/24    Similarity:  Changes noted (New t-wave inversions in V4-V6)    Comparison to cardiac monitor: Yes    Interpretation:     Interpretation: normal    Rate:     ECG rate:  94    ECG rate assessment: normal    Rhythm:     Rhythm: sinus rhythm    Ectopy:     Ectopy: none    QRS:     QRS axis:  Normal    QRS intervals:  Normal  Conduction:     Conduction: normal    ST segments:     ST segments:  Normal  T waves:     T waves: inverted      Inverted:  V4, V5 and V6  Other findings:     Other findings: LVH    Comments:      WA interval: 130ms  QRS duration: 86ms  QT/QTc: 380/475ms      ED Medication and Procedure Management   Prior to Admission Medications   Prescriptions Last Dose Informant Patient Reported? Taking?   Empagliflozin (Jardiance) 10 MG TABS tablet  Self No No   Sig: Take 1 tablet  (10 mg total) by mouth every morning   aspirin (Aspirin Low Dose) 81 mg EC tablet  Self No No   Sig: Take 1 tablet (81 mg total) by mouth daily   atorvastatin (LIPITOR) 40 mg tablet  Self No No   Sig: Take 1 tablet (40 mg total) by mouth daily at bedtime   carvedilol (COREG) 6.25 mg tablet   No No   Sig: Take 1 tablet (6.25 mg total) by mouth 2 (two) times a day with meals   fluticasone (FLONASE) 50 mcg/act nasal spray  Self No No   Si spray into each nostril daily   melatonin 3 mg  Self No No   Sig: Take 1 tablet (3 mg total) by mouth daily at bedtime   nicotine (NICODERM CQ) 21 mg/24 hr TD 24 hr patch  Self No No   Sig: Place 1 patch on the skin over 24 hours every 24 hours   sacubitril-valsartan (ENTRESTO) 24-26 MG TABS   No No   Sig: Take 1 tablet by mouth 2 (two) times a day      Facility-Administered Medications: None     Patient's Medications   Discharge Prescriptions    No medications on file     No discharge procedures on file.  ED SEPSIS DOCUMENTATION   Time reflects when diagnosis was documented in both MDM as applicable and the Disposition within this note       Time User Action Codes Description Comment    2025  7:29 AM Evelyn Mark Add [R07.9] Chest pain                  Evelyn Mark PA-C  25 0737

## 2025-01-23 NOTE — TELEPHONE ENCOUNTER
New Patient Triage  Please Triage:   New Patient-   What is the reason for the patients appointment?  R97.20 (ICD-10-CM) - Elevated PSA       Imaging/Lab Results:   PSA 6.543        History  Has the pt had any previous urologist? no    Have pt records been requested? No    Has the pt had any outside testing done? no    Does the pt have a personal history of cancer?: no

## 2025-01-23 NOTE — ED CARE HANDOFF
Emergency Department Sign Out Note        Sign out and transfer of care from TERRA Mark. See Separate Emergency Department note.     The patient, Ruben Flores, was evaluated by the previous provider for chest pain.    Workup Completed:  See previous notes    ED Course / Workup Pending (followup):  Delta trop okay. BNP pending but unlikley to change dispo given CXR w/o evidence of fluid overload, patients pain resolved and he has no sob ambulating in ED. Can follow up with PCP and cardiology.       HEART Risk Score      Flowsheet Row Most Recent Value   Heart Score Risk Calculator    History 0 Filed at: 01/23/2025 0729   ECG 1 Filed at: 01/23/2025 0729   Age 2 Filed at: 01/23/2025 0729   Risk Factors 2 Filed at: 01/23/2025 0729   Troponin 0 Filed at: 01/23/2025 0729   HEART Score 5 Filed at: 01/23/2025 0729                                    ED Course as of 01/23/25 0917   Thu Jan 23, 2025   0731 Several hours of chest pain w/o associated symptoms.  Initial troponin 12. Pending repeat trop and BNP for dispo.      Procedures  Medical Decision Making  Amount and/or Complexity of Data Reviewed  Labs: ordered.  Radiology: ordered and independent interpretation performed.            Disposition  Final diagnoses:   Chest pain     Time reflects when diagnosis was documented in both MDM as applicable and the Disposition within this note       Time User Action Codes Description Comment    1/23/2025  7:29 AM Evelyn Mark Add [R07.9] Chest pain           ED Disposition       ED Disposition   Discharge    Condition   Stable    Date/Time   Thu Jan 23, 2025 0851    Comment   Ruben Flores discharge to home/self care.                   Follow-up Information       Follow up With Specialties Details Why Contact Info Additional Information    [facility] In 1 week  [address] [place] [address], [address]  [facility] [address] [phone] [facility]  [facility], [address] [place] [address], [address], [place], [place], [address] [phone]    [facility] Schedule an appointment as soon as possible for a visit   [facility] [address] [phone] [facility], [address], [place], [address] [phone]          Patient's Medications   Discharge Prescriptions    No medications on file     No discharge procedures on file.       ED Provider  Electronically Signed by     Edgar Renner DO  01/23/25 0918

## 2025-01-23 NOTE — Clinical Note
Ruben Flores was seen and treated in our emergency department on 1/23/2025.                Diagnosis:     Ruben  .    He may return on this date: 01/26/2025         If you have any questions or concerns, please don't hesitate to call.      Edgar Renner, DO    ______________________________           _______________          _______________  Hospital Representative                              Date                                Time

## 2025-01-23 NOTE — ED NOTES
Assumed care for pt. Pt currently resting on stretcher with no s/s of distress observed.      Christine Burkett RN  01/23/25 1404

## 2025-01-23 NOTE — PROGRESS NOTES
Name: Ruben Flores      : 1955      MRN: [id number]  Encounter Provider: TAMEKA Pozo  Encounter Date: 2025   Encounter department: [facility]  :  Assessment & Plan  Elevated PSA  Patient requesting results of recent PSA testing. Discussed elevated PSA results and recommended further evaluation by urology. Will also complete US KUB with PVR.     Lab Results   Component Value Date    PSA 6.543 (H) 2025     Orders:    Ambulatory Referral to Urology; Future    US kidney and bladder with pvr; Future    Urinary frequency  POCT UA negative for infection. Notable for high glucose, however, this is likely due to Jardiance compliance. He also has blood and protein in his urine, this may be related to diabetic nephropathy. Will order US KUB with PVR in light of elevated PSA. Referral to urology.  Follow up with PCP Dr. Dickey.     Orders:    US kidney and bladder with pvr; Future    POCT urine dip           History of Present Illness   Ruben Mcfarlane is a 69 year old male with a history of HTN, HLD, tobacco abuse, T2DM, CHF, seasonal allergies. He presents to the office today for follow up after a recent ED visit.   He presented to the ED this morning for complaint of chest pain. Troponins were negative, CXR negative, BMP was pending. Patient's symptoms resolved and he was discharged and instructed to follow up with PCP and Cardiology in 1 week.   At this encounter he is requesting the results of his recent PSA screening lab. He states he is having intermittent suprapubic pressure and occasional suprapubic dull discomfort. He denies fever, body aches, chills, dysuria, flank pain, blood in his urine, difficulty urinating. He does endorse frequent urination stating he has to urinate at least every 30 minutes.       Review of Systems   Constitutional:  Negative for chills and fever.   Respiratory:  Negative for cough and shortness of breath.   "  Cardiovascular:  Negative for chest pain and palpitations.   Gastrointestinal:  Positive for abdominal pain. Negative for constipation, diarrhea, nausea and vomiting.   Genitourinary:  Positive for difficulty urinating and frequency. Negative for decreased urine volume, dysuria, flank pain, hematuria, penile discharge, penile pain, penile swelling, scrotal swelling, testicular pain and urgency.   Musculoskeletal:  Negative for myalgias.     Patient Active Problem List   Diagnosis    Primary hypertension    Mixed hyperlipidemia    Tobacco user    Type 2 diabetes mellitus without complication, without long-term current use of insulin (HCC)    Seasonal allergies    Chronic combined systolic and diastolic congestive heart failure (HCC)         Objective   /70 (BP Location: Right arm, Patient Position: Sitting, Cuff Size: Standard)   Pulse 67   Temp 97.9 °F (36.6 °C) (Temporal)   Resp 14   Ht 5' 2\" (1.575 m)   Wt 68.4 kg (150 lb 12.8 oz)   SpO2 97%   BMI 27.58 kg/m²      Physical Exam  Vitals and nursing note reviewed.   Constitutional:       General: He is not in acute distress.     Appearance: He is well-developed.   HENT:      Head: Normocephalic and atraumatic.   Eyes:      Conjunctiva/sclera: Conjunctivae normal.   Cardiovascular:      Rate and Rhythm: Normal rate and regular rhythm.      Heart sounds: No murmur heard.  Pulmonary:      Effort: Pulmonary effort is normal. No respiratory distress.      Breath sounds: Normal breath sounds.   Abdominal:      Palpations: Abdomen is soft.      Tenderness: There is no abdominal tenderness.   Musculoskeletal:         General: No swelling.      Cervical back: Neck supple.   Skin:     General: Skin is warm and dry.      Capillary Refill: Capillary refill takes less than 2 seconds.   Neurological:      Mental Status: He is alert.   Psychiatric:         Mood and Affect: Mood normal.         "

## 2025-01-27 ENCOUNTER — HOSPITAL ENCOUNTER (OUTPATIENT)
Dept: ULTRASOUND IMAGING | Facility: HOSPITAL | Age: 70
Discharge: HOME | End: 2025-01-27
Payer: COMMERCIAL

## 2025-01-27 DIAGNOSIS — R97.20 ELEVATED PSA: ICD-10-CM

## 2025-01-27 DIAGNOSIS — R35.0 URINARY FREQUENCY: ICD-10-CM

## 2025-01-27 PROCEDURE — 76770 US EXAM ABDO BACK WALL COMP: CPT

## 2025-01-31 ENCOUNTER — RESULTS FOLLOW-UP (OUTPATIENT)
Dept: FAMILY MEDICINE CLINIC | Facility: CLINIC | Age: 70
End: 2025-01-31

## 2025-02-03 NOTE — PROGRESS NOTES
2/5/2025      No chief complaint on file.      Assessment and Plan    69 y.o. male managed by New patient     1. Elevated PSA  Assessment & Plan:  Only 1 PSA level on file which is elevated   KRIS- no firmess or nodules noted on exam   Plan to repeat PSA level in about 1-2 months  If PSA level remains elevated,  I recommend we move forward with a multiparametric MRI for further evaluation.  We additionally discussed that this is utilized as a screening tool.  A biopsy of the prostate is the only definitive way to diagnose prostate cancer.  PI-RADS category discussed with patient.  Patient understands if PI-RADS category is concerning that a biopsy would be the next step.   Patient aware we will call with next stpes       Lab Results   Component Value Date    PSA 6.543 (H) 01/13/2025     Orders:  -     POCT urine dip  -     Ambulatory Referral to Urology  -     PSA Total, Diagnostic; Future; Expected date: 03/19/2025  -     Urinalysis with microscopic  2. Benign prostatic hyperplasia with urinary frequency  Assessment & Plan:  BPH   Difficulty urinating with frequency   Renal US- noted prostate enlargement, otherwise normal kidneys   PVR- 40  UA today- negative for infection, small blood   Will send for micro   Positive smoking hx- 9 years- 5 cigarettes per day   Plan to trial flomax   Reviewed usage and side effects   Discussed that his frequency may be due to mediation along with elevated blood sugars   Plan to follow up in 3 months to re evaluate   Orders:  -     tamsulosin (FLOMAX) 0.4 mg; Take 1 capsule (0.4 mg total) by mouth daily with dinner    History of Present Illness  Ruben Flores is a 69 y.o. male here for evaluation of elevated PSA level. Only 1 PSA level on file.  Patient reports never having PSA checked before.  He denies family history of prostate cancer.  He does report some urinary frequency and some difficulty with urination.  Does have a history of type 2 diabetes and is noted to have high  "glucose levels.  He is also on Jardiance.  A renal ultrasound was performed due to having microscopic hematuria which noted kidneys as normal but an enlarged prostate.  No UA with micro noted on file.  Patient does report a positive smoking history of 9 years.  He reports smoking 5 cigarettes/day.  He quit about 3 months ago.  He denies a history of nephrolithiasis.  He denies flank pain or gross hematuria.    Review of Systems   Constitutional:  Negative for chills and fever.   HENT:  Negative for ear pain and sore throat.    Eyes:  Negative for pain and visual disturbance.   Respiratory:  Negative for cough and shortness of breath.    Cardiovascular:  Negative for chest pain and palpitations.   Gastrointestinal:  Negative for abdominal pain and vomiting.   Genitourinary:  Positive for difficulty urinating and frequency. Negative for decreased urine volume, dysuria, flank pain, hematuria and urgency.   Musculoskeletal:  Negative for arthralgias and back pain.   Skin:  Negative for color change and rash.   Neurological:  Negative for seizures and syncope.   All other systems reviewed and are negative.      Vitals  Vitals:    02/05/25 1003   BP: 132/80   BP Location: Left arm   Patient Position: Sitting   Cuff Size: Standard   Pulse: 91   SpO2: 98%   Weight: 68 kg (150 lb)   Height: 5' 2\" (1.575 m)       Physical Exam  Vitals reviewed.   Constitutional:       Appearance: Normal appearance.   HENT:      Head: Normocephalic and atraumatic.   Eyes:      Conjunctiva/sclera: Conjunctivae normal.   Pulmonary:      Effort: Pulmonary effort is normal.   Abdominal:      General: Abdomen is flat. There is no distension.      Palpations: Abdomen is soft.      Tenderness: There is no abdominal tenderness.   Genitourinary:     Penis: Normal.       Testes: Normal.      Prostate: Enlarged. Not tender and no nodules present.   Musculoskeletal:         General: Normal range of motion.      Cervical back: Normal range of motion. "   Skin:     General: Skin is warm and dry.   Neurological:      General: No focal deficit present.      Mental Status: He is alert and oriented to person, place, and time.   Psychiatric:         Mood and Affect: Mood normal.         Behavior: Behavior normal.         Thought Content: Thought content normal.         Judgment: Judgment normal.           Past History  Past Medical History:   Diagnosis Date   • HLD (hyperlipidemia)    • Hypertension      Social History     Socioeconomic History   • Marital status: /Civil Union     Spouse name: Not on file   • Number of children: Not on file   • Years of education: Not on file   • Highest education level: Not on file   Occupational History   • Not on file   Tobacco Use   • Smoking status: Former     Types: Cigarettes     Start date: 2024     Passive exposure: Current   • Smokeless tobacco: Former   Vaping Use   • Vaping status: Former   • Substances: Flavoring   Substance and Sexual Activity   • Alcohol use: Not Currently   • Drug use: Not Currently     Comment: past use of Cocaine   • Sexual activity: Not on file   Other Topics Concern   • Not on file   Social History Narrative   • Not on file     Social Drivers of Health     Financial Resource Strain: Medium Risk (4/26/2024)    Overall Financial Resource Strain (CARDIA)    • Difficulty of Paying Living Expenses: Somewhat hard   Food Insecurity: No Food Insecurity (11/20/2024)    Nursing - Inadequate Food Risk Classification    • Worried About Running Out of Food in the Last Year: Never true    • Ran Out of Food in the Last Year: Never true    • Ran Out of Food in the Last Year: Never true   Transportation Needs: No Transportation Needs (11/20/2024)    Nursing - Transportation Risk Classification    • Lack of Transportation: Not on file    • Lack of Transportation: No   Physical Activity: Not on file   Stress: Not on file   Social Connections: Not on file   Intimate Partner Violence: Unknown (11/20/2024)     Nursing IPS    • Feels Physically and Emotionally Safe: Not on file    • Physically Hurt by Someone: Not on file    • Humiliated or Emotionally Abused by Someone: Not on file    • Physically Hurt by Someone: No    • Hurt or Threatened by Someone: No   Housing Stability: Unknown (2024)    Nursing: Inadequate Housing Risk Classification    • Has Housing: Not on file    • Worried About Losing Housing: Not on file    • Unable to Get Utilities: Not on file    • Unable to Pay for Housing in the Last Year: No    • Has Housin     Social History     Tobacco Use   Smoking Status Former   • Types: Cigarettes   • Start date:    • Passive exposure: Current   Smokeless Tobacco Former     No family history on file.    The following portions of the patient's history were reviewed and updated as appropriate: allergies, current medications, past medical history, past social history, past surgical history and problem list.    Results  Recent Results (from the past hour)   POCT urine dip    Collection Time: 25 10:12 AM   Result Value Ref Range    LEUKOCYTE ESTERASE,UA neg     NITRITE,UA neg     SL AMB POCT UROBILINOGEN neg     POCT URINE PROTEIN neg      PH,UA 5.0     BLOOD,UA small     SPECIFIC GRAVITY,UA 1.015     KETONES,UA neg     BILIRUBIN,UA neg     GLUCOSE, UA 2000 or more      COLOR,UA Yellow     CLARITY,UA clear    ]  Lab Results   Component Value Date    PSA 6.543 (H) 2025     Lab Results   Component Value Date    CALCIUM 9.4 2025    K 4.0 2025    CO2 31 2025    CL 94 (L) 2025    BUN 11 2025    CREATININE 0.86 2025     Lab Results   Component Value Date    WBC 9.75 2025    HGB 14.4 2025    HCT 43.2 2025    MCV 85 2025     2025

## 2025-02-05 ENCOUNTER — CONSULT (OUTPATIENT)
Dept: UROLOGY | Facility: CLINIC | Age: 70
End: 2025-02-05
Payer: COMMERCIAL

## 2025-02-05 VITALS
WEIGHT: 150 LBS | DIASTOLIC BLOOD PRESSURE: 80 MMHG | SYSTOLIC BLOOD PRESSURE: 132 MMHG | HEART RATE: 91 BPM | OXYGEN SATURATION: 98 % | BODY MASS INDEX: 27.6 KG/M2 | HEIGHT: 62 IN

## 2025-02-05 DIAGNOSIS — R97.20 ELEVATED PSA: Primary | ICD-10-CM

## 2025-02-05 DIAGNOSIS — N40.1 BENIGN PROSTATIC HYPERPLASIA WITH URINARY FREQUENCY: ICD-10-CM

## 2025-02-05 DIAGNOSIS — R35.0 BENIGN PROSTATIC HYPERPLASIA WITH URINARY FREQUENCY: ICD-10-CM

## 2025-02-05 LAB
BACTERIA UR QL AUTO: ABNORMAL /HPF
BILIRUB UR QL STRIP: NEGATIVE
CLARITY UR: CLEAR
COLOR UR: COLORLESS
GLUCOSE UR STRIP-MCNC: ABNORMAL MG/DL
HGB UR QL STRIP.AUTO: NEGATIVE
KETONES UR STRIP-MCNC: NEGATIVE MG/DL
LEUKOCYTE ESTERASE UR QL STRIP: ABNORMAL
NITRITE UR QL STRIP: NEGATIVE
NON-SQ EPI CELLS URNS QL MICRO: ABNORMAL /HPF
PH UR STRIP.AUTO: 6.5 [PH]
PROT UR STRIP-MCNC: NEGATIVE MG/DL
RBC #/AREA URNS AUTO: ABNORMAL /HPF
SL AMB  POCT GLUCOSE, UA: ABNORMAL
SL AMB LEUKOCYTE ESTERASE,UA: ABNORMAL
SL AMB POCT BILIRUBIN,UA: ABNORMAL
SL AMB POCT BLOOD,UA: ABNORMAL
SL AMB POCT CLARITY,UA: CLEAR
SL AMB POCT COLOR,UA: YELLOW
SL AMB POCT KETONES,UA: ABNORMAL
SL AMB POCT NITRITE,UA: ABNORMAL
SL AMB POCT PH,UA: 5
SL AMB POCT SPECIFIC GRAVITY,UA: 1.01
SL AMB POCT URINE PROTEIN: ABNORMAL
SL AMB POCT UROBILINOGEN: ABNORMAL
SP GR UR STRIP.AUTO: 1.03 (ref 1–1.03)
UROBILINOGEN UR STRIP-ACNC: <2 MG/DL
WBC #/AREA URNS AUTO: ABNORMAL /HPF

## 2025-02-05 PROCEDURE — 99204 OFFICE O/P NEW MOD 45 MIN: CPT

## 2025-02-05 PROCEDURE — 81001 URINALYSIS AUTO W/SCOPE: CPT

## 2025-02-05 PROCEDURE — 81002 URINALYSIS NONAUTO W/O SCOPE: CPT

## 2025-02-05 RX ORDER — TAMSULOSIN HYDROCHLORIDE 0.4 MG/1
0.4 CAPSULE ORAL
Qty: 90 CAPSULE | Refills: 1 | Status: SHIPPED | OUTPATIENT
Start: 2025-02-05 | End: 2025-08-04

## 2025-02-05 NOTE — ASSESSMENT & PLAN NOTE
BPH   Difficulty urinating with frequency   Renal US- noted prostate enlargement, otherwise normal kidneys   PVR- 40  UA today- negative for infection, small blood   Will send for micro   Positive smoking hx- 9 years- 5 cigarettes per day   Plan to trial flomax   Reviewed usage and side effects   Discussed that his frequency may be due to mediation along with elevated blood sugars   Plan to follow up in 3 months to re evaluate

## 2025-02-05 NOTE — ASSESSMENT & PLAN NOTE
Only 1 PSA level on file which is elevated   KRIS- no firmess or nodules noted on exam   Plan to repeat PSA level in about 1-2 months  If PSA level remains elevated,  I recommend we move forward with a multiparametric MRI for further evaluation.  We additionally discussed that this is utilized as a screening tool.  A biopsy of the prostate is the only definitive way to diagnose prostate cancer.  PI-RADS category discussed with patient.  Patient understands if PI-RADS category is concerning that a biopsy would be the next step.   Patient aware we will call with next [facility]       Lab Results   Component Value Date    PSA 6.543 (H) 01/13/2025

## 2025-02-11 DIAGNOSIS — I50.42 CHRONIC COMBINED SYSTOLIC AND DIASTOLIC CONGESTIVE HEART FAILURE (HCC): ICD-10-CM

## 2025-02-11 DIAGNOSIS — J30.2 SEASONAL ALLERGIES: ICD-10-CM

## 2025-02-11 RX ORDER — FLUTICASONE PROPIONATE 50 MCG
1 SPRAY, SUSPENSION (ML) NASAL DAILY
Qty: 16 G | Refills: 0 | Status: SHIPPED | OUTPATIENT
Start: 2025-02-11

## 2025-02-11 NOTE — TELEPHONE ENCOUNTER
Patient request refill on following medication .      Empagliflozin (Jardiance) 10 MG TABS tablet      fluticasone (FLONASE) 50 mcg/act nasal spray

## 2025-02-27 ENCOUNTER — TELEPHONE (OUTPATIENT)
Dept: GASTROENTEROLOGY | Facility: MEDICAL CENTER | Age: 70
End: 2025-02-27

## 2025-03-03 ENCOUNTER — RESULTS FOLLOW-UP (OUTPATIENT)
Dept: CARDIOLOGY CLINIC | Facility: CLINIC | Age: 70
End: 2025-03-03

## 2025-03-03 ENCOUNTER — HOSPITAL ENCOUNTER (OUTPATIENT)
Dept: NON INVASIVE DIAGNOSTICS | Facility: HOSPITAL | Age: 70
Discharge: HOME | End: 2025-03-03
Payer: COMMERCIAL

## 2025-03-03 VITALS
DIASTOLIC BLOOD PRESSURE: 80 MMHG | HEART RATE: 90 BPM | HEIGHT: 62 IN | BODY MASS INDEX: 27.6 KG/M2 | WEIGHT: 150 LBS | SYSTOLIC BLOOD PRESSURE: 132 MMHG

## 2025-03-03 DIAGNOSIS — I50.42 CHRONIC COMBINED SYSTOLIC AND DIASTOLIC CONGESTIVE HEART FAILURE (HCC): ICD-10-CM

## 2025-03-03 LAB
AORTIC ROOT: 3 CM
BSA FOR ECHO PROCEDURE: 1.69 M2
E WAVE DECELERATION TIME: 211 MS
E/A RATIO: 0.66
FRACTIONAL SHORTENING: 13 (ref 28–44)
INTERVENTRICULAR SEPTUM IN DIASTOLE (PARASTERNAL SHORT AXIS VIEW): 1 CM
INTERVENTRICULAR SEPTUM: 1 CM (ref 0.6–1.1)
LAAS-AP2: 17.2 CM2
LAAS-AP4: 22.1 CM2
LEFT ATRIUM SIZE: 4.8 CM
LEFT ATRIUM VOLUME (MOD BIPLANE): 58 ML
LEFT ATRIUM VOLUME INDEX (MOD BIPLANE): 34.3 ML/M2
LEFT INTERNAL DIMENSION IN SYSTOLE: 4.6 CM (ref 2.1–4)
LEFT VENTRICULAR INTERNAL DIMENSION IN DIASTOLE: 5.3 CM (ref 3.5–6)
LEFT VENTRICULAR POSTERIOR WALL IN END DIASTOLE: 1 CM
LEFT VENTRICULAR STROKE VOLUME: 40 ML
LV EF US.2D.A4C+ESTIMATED: 39 %
LVSV (TEICH): 40 ML
MV E'TISSUE VEL-SEP: 5 CM/S
MV PEAK A VEL: 0.91 M/S
MV PEAK E VEL: 60 CM/S
MV STENOSIS PRESSURE HALF TIME: 61 MS
MV VALVE AREA P 1/2 METHOD: 3.61
RIGHT ATRIUM AREA SYSTOLE A4C: 16.5 CM2
RIGHT VENTRICLE ID DIMENSION: 3.2 CM
SL CV LEFT ATRIUM LENGTH A2C: 5.2 CM
SL CV LV EF: 39
SL CV PED ECHO LEFT VENTRICLE DIASTOLIC VOLUME (MOD BIPLANE) 2D: 136 ML
SL CV PED ECHO LEFT VENTRICLE SYSTOLIC VOLUME (MOD BIPLANE) 2D: 95 ML
TRICUSPID ANNULAR PLANE SYSTOLIC EXCURSION: 1.8 CM

## 2025-03-03 PROCEDURE — 93306 TTE W/DOPPLER COMPLETE: CPT

## 2025-03-03 PROCEDURE — 93306 TTE W/DOPPLER COMPLETE: CPT | Performed by: INTERNAL MEDICINE

## 2025-03-04 NOTE — TELEPHONE ENCOUNTER
"Placed call to patient. \"The person you are calling cannot accept calls at this time.\"    Placed call to daughter, Samantha. She verbalized understanding. She asked that we always give her a call because she states he doesn't always understand what is going on.   "

## 2025-03-06 ENCOUNTER — TELEPHONE (OUTPATIENT)
Dept: FAMILY MEDICINE CLINIC | Facility: CLINIC | Age: 70
End: 2025-03-06

## 2025-03-06 DIAGNOSIS — E78.2 MIXED HYPERLIPIDEMIA: ICD-10-CM

## 2025-03-06 RX ORDER — ATORVASTATIN CALCIUM 40 MG/1
40 TABLET, FILM COATED ORAL
Qty: 90 TABLET | Refills: 4 | Status: SHIPPED | OUTPATIENT
Start: 2025-03-06

## 2025-03-06 NOTE — TELEPHONE ENCOUNTER
Patient came into office today stating that per pharmacy there is no refills. Please resent medication atorvastatin (LIPITOR) 40 mg tablet. Thanks!

## 2025-03-17 ENCOUNTER — TELEPHONE (OUTPATIENT)
Age: 70
End: 2025-03-17

## 2025-03-17 NOTE — TELEPHONE ENCOUNTER
Caller: Ruben Mcfarlane     Doctor: Dr. Garrison     Reason for call: Patient has an appt scheduled for 4/7/25. He says he received a call telling him to have blood work done 3 days before his appt. There are no new lab orders in his chart . Please call the patient to clarify.     Call back#: [phone]

## 2025-03-20 ENCOUNTER — TELEPHONE (OUTPATIENT)
Dept: FAMILY MEDICINE CLINIC | Facility: CLINIC | Age: 70
End: 2025-03-20

## 2025-03-20 DIAGNOSIS — I50.42 CHRONIC COMBINED SYSTOLIC AND DIASTOLIC CONGESTIVE HEART FAILURE (HCC): ICD-10-CM

## 2025-03-20 NOTE — TELEPHONE ENCOUNTER
Pt came into office and is requesting refills for the following medications, please advise.    Empagliflozin (Jardiance) 10 MG TABS tablet     aspirin (Aspirin Low Dose) 81 mg EC tablet        I spoke with pharmacist and they told me that the jardiance was canceled and they're needing a new script to be sent.

## 2025-03-21 RX ORDER — EMPAGLIFLOZIN 10 MG/1
10 TABLET, FILM COATED ORAL EVERY MORNING
Qty: 30 TABLET | Refills: 0 | Status: SHIPPED | OUTPATIENT
Start: 2025-03-21 | End: 2025-03-24 | Stop reason: SDUPTHER

## 2025-03-24 DIAGNOSIS — I50.42 CHRONIC COMBINED SYSTOLIC AND DIASTOLIC CONGESTIVE HEART FAILURE (HCC): ICD-10-CM

## 2025-03-24 DIAGNOSIS — I10 ESSENTIAL HYPERTENSION: ICD-10-CM

## 2025-03-24 RX ORDER — ASPIRIN 81 MG/1
81 TABLET ORAL DAILY
Qty: 90 TABLET | Refills: 1 | Status: SHIPPED | OUTPATIENT
Start: 2025-03-24

## 2025-04-05 NOTE — ASSESSMENT & PLAN NOTE
1/30/2024 cholesterol 176, triglycerides 125, HDL 59, LDL calculated 92  Continue atorvastatin 40 mg daily

## 2025-04-05 NOTE — ASSESSMENT & PLAN NOTE
4/26/2024 118/74  11/13/2024 148/93  11/20/2024 101/64  1/23/2025 120/70  2/5/2025 132/80  3/3/2025 132/80    Carvedilol 6.25 mg 2 times daily  Entresto 24/26 2 times daily

## 2025-04-05 NOTE — ASSESSMENT & PLAN NOTE
Wt Readings from Last 3 Encounters:   03/03/25 68 kg (150 lb)   02/05/25 68 kg (150 lb)   01/23/25 68.4 kg (150 lb 12.8 oz)     3/3/2025 echo: Left ventricular cavity mildly dilated.  LVEF 39% consistent with moderately reduced systolic function.  Improved from prior record of 27%.  GLS -10.8%.  Moderate global hypokinesis.  Grade 1 diastolic dysfunction.  Mild LAE.    BNP 11/20/2024 --> 1/23/2025:  564 --> 95    Aspirin 81 mg  Carvedilol 6.25 mg 2 times daily  Jardiance 10 mg every morning  Entresto 24-26 2 times daily

## 2025-04-07 ENCOUNTER — OFFICE VISIT (OUTPATIENT)
Dept: CARDIOLOGY CLINIC | Facility: CLINIC | Age: 70
End: 2025-04-07
Payer: COMMERCIAL

## 2025-04-07 VITALS
WEIGHT: 156 LBS | HEART RATE: 84 BPM | SYSTOLIC BLOOD PRESSURE: 136 MMHG | BODY MASS INDEX: 25.99 KG/M2 | HEIGHT: 65 IN | DIASTOLIC BLOOD PRESSURE: 70 MMHG

## 2025-04-07 DIAGNOSIS — I10 PRIMARY HYPERTENSION: ICD-10-CM

## 2025-04-07 DIAGNOSIS — Z72.0 TOBACCO USER: ICD-10-CM

## 2025-04-07 DIAGNOSIS — I10 ESSENTIAL HYPERTENSION: ICD-10-CM

## 2025-04-07 DIAGNOSIS — I50.42 CHRONIC COMBINED SYSTOLIC AND DIASTOLIC CONGESTIVE HEART FAILURE (HCC): Primary | ICD-10-CM

## 2025-04-07 DIAGNOSIS — R07.9 CHEST PAIN: ICD-10-CM

## 2025-04-07 DIAGNOSIS — E11.9 TYPE 2 DIABETES MELLITUS WITHOUT COMPLICATION, WITHOUT LONG-TERM CURRENT USE OF INSULIN (HCC): ICD-10-CM

## 2025-04-07 DIAGNOSIS — E78.2 MIXED HYPERLIPIDEMIA: ICD-10-CM

## 2025-04-07 PROCEDURE — 99214 OFFICE O/P EST MOD 30 MIN: CPT | Performed by: INTERNAL MEDICINE

## 2025-04-07 RX ORDER — ASPIRIN 81 MG/1
81 TABLET ORAL DAILY
Qty: 90 TABLET | Refills: 3 | Status: SHIPPED | OUTPATIENT
Start: 2025-04-07

## 2025-04-07 RX ORDER — ATORVASTATIN CALCIUM 40 MG/1
40 TABLET, FILM COATED ORAL
Qty: 90 TABLET | Refills: 3 | Status: SHIPPED | OUTPATIENT
Start: 2025-04-07

## 2025-04-07 RX ORDER — CARVEDILOL 6.25 MG/1
6.25 TABLET ORAL 2 TIMES DAILY WITH MEALS
Qty: 180 TABLET | Refills: 3 | Status: SHIPPED | OUTPATIENT
Start: 2025-04-07

## 2025-04-07 NOTE — PROGRESS NOTES
[facility]  [address] [place] [address]  Phone#  [phone]   Fax#  [phone]  *-*-*-*-*-*-*-*-*-*-*-*-*-*-*-*-*-*-*-*-*-*-*-*-*-*-*-*-*-*-*-*-*-*-*-*-*-*-*-*-*-*-*-*-*-*-*-*-*-*-*-*-*-*                                   Cardiology Follow Up      ENCOUNTER DATE: 25 8:50 AM  PATIENT NAME: Ruben Flores   : 1955    MRN: [id number]  AGE:69 y.o.      SEX: male  ENCOUNTER PROVIDER:Maikel Garrison MD     PRIMARY CARE PHYSICIAN: Yanira Dickey MD    ACTIVE DIAGNOSIS AND PLAN    1. Chronic combined systolic and diastolic congestive heart failure (HCC)  Assessment & Plan:  Wt Readings from Last 3 Encounters:   25 68 kg (150 lb)   25 68 kg (150 lb)   25 68.4 kg (150 lb 12.8 oz)     3/3/2025 echo: Left ventricular cavity mildly dilated.  LVEF 39% consistent with moderately reduced systolic function.  Improved from prior record of 27%.  GLS -10.8%.  Moderate global hypokinesis.  Grade 1 diastolic dysfunction.  Mild LAE.    BNP 2024 --> 2025:  564 --> 95    Aspirin 81 mg  Carvedilol 6.25 mg 2 times daily  Jardiance 10 mg every morning  Entresto 24-26 2 times daily  Orders:  -     carvedilol (COREG) 6.25 mg tablet; Take 1 tablet (6.25 mg total) by mouth 2 (two) times a day with meals  -     Empagliflozin (Jardiance) 10 MG TABS tablet; Take 1 tablet (10 mg total) by mouth every morning  -     sacubitril-valsartan (ENTRESTO) 24-26 MG TABS; Take 1 tablet by mouth 2 (two) times a day  2. Mixed hyperlipidemia  Assessment & Plan:  2024 cholesterol 176, triglycerides 125, HDL 59, LDL calculated 92  Continue atorvastatin 40 mg daily  Orders:  -     atorvastatin (LIPITOR) 40 mg tablet; Take 1 tablet (40 mg total) by mouth daily at bedtime  -     Lipid Panel with Direct LDL reflex; Future; Expected date: 2025  3. Primary hypertension  Assessment &  Plan:  4/26/2024 118/74  11/13/2024 148/93  11/20/2024 101/64  1/23/2025 120/70  2/5/2025 132/80  3/3/2025 132/80    Carvedilol 6.25 mg 2 times daily  Entresto 24/26 2 times daily  4. Type 2 diabetes mellitus without complication, without long-term current use of insulin ([facility])  Assessment & Plan:    Lab Results   Component Value Date    HGBA1C 6.9 (H) 11/20/2024     Defer management to PCP  5. Tobacco user  Assessment & Plan:  Encourage patient to quit smoking.    6. Chest pain  -     Ambulatory Referral to Cardiology  7. Essential hypertension  -     aspirin (Aspirin Low Dose) 81 mg EC tablet; Take 1 tablet (81 mg total) by mouth daily     INTERVAL HISTORY:        Patient was referred for evaluation of chest discomfort.  However patient adamantly denied having any discomfort in his chest. He denies shortness of breath.  He has no palpitations.  He denies symptoms of dizziness, lightheadedness or near-syncope/syncope.  He denies leg edema.  He denies symptoms of orthopnea or paroxysmal nocturnal dyspnea.    His blood pressure is minimally elevated at 136/70.  His blood pressure is usually good.  His lipid profile is acceptable but he is 2-year-old.  His examination demonstrates that he is euvolemic.  His weight is up 6 pounds from previous.  He does have significant layers of clothes on which may be accounting for his increase in weight.      DISCUSSION/PLAN:          Repeat fasting lipid profile  All patient's prescriptions renewed  Return in 3 months.    CARDIOLOGY HISTORY AND TESTING  11/20-11/24/2024 [facility] with acute congestive heart failure.  Other problems include primary hypertension, mixed hyperlipidemia, tobacco user, type 2 diabetes mellitus without complication, without long-term current use of insulin    11/21/2024 normal LV cavity size.  Severely reduced systolic function, EF 27% and GLS -9%.  Strain shows some degree of apical sparing.  May consider additional workup as clinically indicated.   Severe global hypokinesis and diastolic function is severely abnormal consistent with ungraded restrictive relaxation LAP is elevated RV systolic function low normal.  Severe LAE.  Trace AR.  Mild MAC and mild MR.  Mild to moderate TR with PASP 59 mmHg    3/3/2025 echo: Left ventricular cavity mildly dilated.  LVEF 39% consistent with moderately reduced systolic function.  Improved from prior record of 27%.  GLS -10.8%.  Moderate global hypokinesis.  Grade 1 diastolic dysfunction.  Mild LAE.    ACTIVE PROBLEM LIST  Patient Active Problem List   Diagnosis    Primary hypertension    Mixed hyperlipidemia    Tobacco user    Type 2 diabetes mellitus without complication, without long-term current use of insulin (HCC)    Seasonal allergies    Chronic combined systolic and diastolic congestive heart failure (HCC)    Elevated PSA    Benign prostatic hyperplasia with urinary frequency       TODAY'S EKG  No results found for this visit on 04/07/25.      Lab Studies:    Lab Results   Component Value Date    CHOLESTEROL 176 01/30/2024    CHOLESTEROL 215 (H) 05/20/2021    CHOLESTEROL 147 01/11/2019     Lab Results   Component Value Date    TRIG 125 01/30/2024    TRIG 205 (H) 05/20/2021    TRIG 142 01/11/2019     Lab Results   Component Value Date    HDL 59 01/30/2024    HDL 42 05/20/2021    HDL 37 (L) 01/11/2019     Lab Results   Component Value Date    LDLCALC 92 01/30/2024    LDLCALC 132 (H) 05/20/2021    LDLCALC 82 01/11/2019     Lab Results   Component Value Date    HGBA1C 6.9 (H) 11/20/2024    HGBA1C 6.8 (A) 04/26/2024    HGBA1C 7.6 (A) 11/13/2023      Lab Results   Component Value Date    EGFR 89 01/23/2025    EGFR 84 12/05/2024    EGFR 56 11/25/2024    SODIUM 134 (L) 01/23/2025    SODIUM 138 12/05/2024    SODIUM 134 (L) 11/25/2024    K 4.0 01/23/2025    K 4.7 12/05/2024    K 5.0 11/25/2024    CL 94 (L) 01/23/2025     12/05/2024    CL 98 11/25/2024    CO2 31 01/23/2025    CO2 30 12/05/2024    CO2 26 11/25/2024    BUN  11 01/23/2025    BUN 12 12/05/2024    BUN 22 11/25/2024    CREATININE 0.86 01/23/2025    CREATININE 0.93 12/05/2024    CREATININE 1.30 11/25/2024    MG 2.3 11/23/2024    MG 1.8 (L) 11/22/2024    MG 1.8 (L) 11/21/2024     Lab Results   Component Value Date    WBC 9.75 01/23/2025    WBC 8.84 11/21/2024    WBC 8.70 11/20/2024    HGB 14.4 01/23/2025    HGB 12.4 11/21/2024    HGB 13.2 11/20/2024    HCT 43.2 01/23/2025    HCT 37.7 11/21/2024    HCT 40.2 11/20/2024    MCV 85 01/23/2025    MCV 90 11/21/2024    MCV 92 11/20/2024    MCH 28.3 01/23/2025    MCH 29.7 11/21/2024    MCH 30.2 11/20/2024    MCHC 33.3 01/23/2025    MCHC 32.9 11/21/2024    MCHC 32.8 11/20/2024     01/23/2025     11/21/2024     11/20/2024      Lab Results   Component Value Date    CALCIUM 9.4 01/23/2025    CALCIUM 9.2 12/05/2024    CALCIUM 9.6 11/25/2024    ALB 4.2 01/23/2025    ALB 4.2 11/25/2024    ALB 3.8 11/23/2024    TP 8.1 01/23/2025    TP 8.0 11/25/2024    TP 7.3 11/23/2024    AST 27 01/23/2025    AST 25 11/25/2024    AST 15 11/23/2024    ALT 19 01/23/2025    ALT 23 11/25/2024    ALT 12 11/23/2024    ALKPHOS 128 (H) 01/23/2025    ALKPHOS 83 11/25/2024    ALKPHOS 80 11/23/2024     Lab Results   Component Value Date    DDIMER 0.96 (H) 11/20/2024    BNP 95 01/23/2025     (H) 11/20/2024     (H) 11/13/2024    NTBNP 79.0 12/02/2019     Lab Results   Component Value Date    DDIMER 0.96 (H) 11/20/2024       Current Outpatient Medications:     aspirin (Aspirin Low Dose) 81 mg EC tablet, Take 1 tablet (81 mg total) by mouth daily, Disp: 90 tablet, Rfl: 3    atorvastatin (LIPITOR) 40 mg tablet, Take 1 tablet (40 mg total) by mouth daily at bedtime, Disp: 90 tablet, Rfl: 3    carvedilol (COREG) 6.25 mg tablet, Take 1 tablet (6.25 mg total) by mouth 2 (two) times a day with meals, Disp: 180 tablet, Rfl: 3    Empagliflozin (Jardiance) 10 MG TABS tablet, Take 1 tablet (10 mg total) by mouth every morning, Disp: 90 tablet,  Rfl: 3    melatonin 3 mg, Take 1 tablet (3 mg total) by mouth daily at bedtime, Disp: 15 tablet, Rfl: 0    nicotine (NICODERM CQ) 21 mg/24 hr TD 24 hr patch, Place 1 patch on the skin over 24 hours every 24 hours, Disp: 30 patch, Rfl: 5    sacubitril-valsartan (ENTRESTO) 24-26 MG TABS, Take 1 tablet by mouth 2 (two) times a day, Disp: 180 tablet, Rfl: 3    tamsulosin (FLOMAX) 0.4 mg, Take 1 capsule (0.4 mg total) by mouth daily with dinner, Disp: 90 capsule, Rfl: 1  Allergies   Allergen Reactions    Pollen Extract Itching    Ioversol Rash       Past Medical History:   Diagnosis Date    HLD (hyperlipidemia)     Hypertension      Social History     Socioeconomic History    Marital status: /Civil Union     Spouse name: Not on file    Number of children: Not on file    Years of education: Not on file    Highest education level: Not on file   Occupational History    Not on file   Tobacco Use    Smoking status: Former     Types: Cigarettes     Start date: 2024     Passive exposure: Current    Smokeless tobacco: Former   Vaping Use    Vaping status: Former    Substances: Flavoring   Substance and Sexual Activity    Alcohol use: Not Currently    Drug use: Not Currently     Comment: past use of Cocaine    Sexual activity: Not on file   Other Topics Concern    Not on file   Social History Narrative    Not on file     Social Drivers of Health     Financial Resource Strain: Medium Risk (4/26/2024)    Overall Financial Resource Strain (CARDIA)     Difficulty of Paying Living Expenses: Somewhat hard   Food Insecurity: No Food Insecurity (11/20/2024)    Nursing - Inadequate Food Risk Classification     Worried About Running Out of Food in the Last Year: Never true     Ran Out of Food in the Last Year: Never true     Ran Out of Food in the Last Year: Never true   Transportation Needs: No Transportation Needs (11/20/2024)    Nursing - Transportation Risk Classification     Lack of Transportation: Not on file     Lack of  "Transportation: No   Physical Activity: Not on file   Stress: Not on file   Social Connections: Not on file   Intimate Partner Violence: Unknown (2024)    Nursing IPS     Feels Physically and Emotionally Safe: Not on file     Physically Hurt by Someone: Not on file     Humiliated or Emotionally Abused by Someone: Not on file     Physically Hurt by Someone: No     Hurt or Threatened by Someone: No   Housing Stability: Unknown (2024)    Nursing: Inadequate Housing Risk Classification     Has Housing: Not on file     Worried About Losing Housing: Not on file     Unable to Get Utilities: Not on file     Unable to Pay for Housing in the Last Year: No     Has Housin      History reviewed. No pertinent family history.  History reviewed. No pertinent surgical history.    PREVIOUS WEIGHTS:   Wt Readings from Last 10 Encounters:   25 70.8 kg (156 lb)   25 68 kg (150 lb)   25 68 kg (150 lb)   25 68.4 kg (150 lb 12.8 oz)   25 68.8 kg (151 lb 10.8 oz)   25 67.4 kg (148 lb 9.6 oz)   25 73.1 kg (161 lb 3.2 oz)   24 67.9 kg (149 lb 12.8 oz)   24 68 kg (150 lb)   24 66.2 kg (146 lb)        Review of Systems:  Review of Systems   Constitutional:  Negative for activity change.   Respiratory:  Negative for cough, chest tightness, shortness of breath and wheezing.    Cardiovascular:  Negative for chest pain, palpitations and leg swelling.   Musculoskeletal:  Negative for gait problem.   Skin:  Negative for color change.   Neurological:  Negative for dizziness, tremors, syncope, weakness, light-headedness and headaches.   Psychiatric/Behavioral:  Negative for agitation and confusion.        Physical Exam:  /70 (BP Location: Left arm, Patient Position: Sitting, Cuff Size: Standard)   Pulse 84   Ht 5' 5\" (1.651 m)   Wt 70.8 kg (156 lb)   BMI 25.96 kg/m²     Physical Exam  Vitals reviewed.   Constitutional:       General: He is not in acute distress.     " "Appearance: He is well-developed.   HENT:      Head: Normocephalic and atraumatic.   Neck:      Thyroid: No thyromegaly.      Vascular: No carotid bruit or JVD.      Trachea: No tracheal deviation.   Cardiovascular:      Rate and Rhythm: Normal rate and regular rhythm.      Pulses: Normal pulses.      Heart sounds: Normal heart sounds. No murmur heard.     No friction rub. No gallop.   Pulmonary:      Effort: Pulmonary effort is normal. No respiratory distress.      Breath sounds: Normal breath sounds. No wheezing, rhonchi or rales.   Chest:      Chest wall: No tenderness.   Musculoskeletal:      Cervical back: Normal range of motion and neck supple.      Right lower leg: No edema.      Left lower leg: No edema.   Skin:     General: Skin is warm and dry.   Neurological:      General: No focal deficit present.      Mental Status: He is alert and oriented to person, place, and time.   Psychiatric:         Mood and Affect: Mood normal.         Behavior: Behavior normal.         Thought Content: Thought content normal.         Judgment: Judgment normal.         ======================================================  Imaging:   Results Review Statement: No pertinent imaging studies reviewed.    Portions of the record may have been created with voice recognition software. Occasional wrong word or \"sound a like\" substitutions may have occurred due to the inherent limitations of voice recognition software. Read the chart carefully and recognize, using context, where substitutions have occurred.    SIGNATURES:   Maikel Garrison MD   "

## 2025-04-21 ENCOUNTER — TELEPHONE (OUTPATIENT)
Age: 70
End: 2025-04-21

## 2025-04-21 ENCOUNTER — OFFICE VISIT (OUTPATIENT)
Dept: FAMILY MEDICINE CLINIC | Facility: CLINIC | Age: 70
End: 2025-04-21

## 2025-04-21 VITALS
RESPIRATION RATE: 16 BRPM | OXYGEN SATURATION: 100 % | HEIGHT: 65 IN | SYSTOLIC BLOOD PRESSURE: 110 MMHG | WEIGHT: 153 LBS | DIASTOLIC BLOOD PRESSURE: 60 MMHG | HEART RATE: 82 BPM | TEMPERATURE: 96.8 F | BODY MASS INDEX: 25.49 KG/M2

## 2025-04-21 DIAGNOSIS — Z72.0 TOBACCO USER: ICD-10-CM

## 2025-04-21 DIAGNOSIS — I10 PRIMARY HYPERTENSION: ICD-10-CM

## 2025-04-21 DIAGNOSIS — Z12.11 COLON CANCER SCREENING: ICD-10-CM

## 2025-04-21 DIAGNOSIS — E11.9 TYPE 2 DIABETES MELLITUS WITHOUT COMPLICATION, WITHOUT LONG-TERM CURRENT USE OF INSULIN (HCC): Primary | ICD-10-CM

## 2025-04-21 DIAGNOSIS — J30.2 SEASONAL ALLERGIES: ICD-10-CM

## 2025-04-21 LAB — SL AMB POCT HEMOGLOBIN AIC: 6.7 (ref ?–6.5)

## 2025-04-21 PROCEDURE — G2211 COMPLEX E/M VISIT ADD ON: HCPCS | Performed by: FAMILY MEDICINE

## 2025-04-21 PROCEDURE — 99213 OFFICE O/P EST LOW 20 MIN: CPT | Performed by: FAMILY MEDICINE

## 2025-04-21 PROCEDURE — 3074F SYST BP LT 130 MM HG: CPT | Performed by: FAMILY MEDICINE

## 2025-04-21 PROCEDURE — 3078F DIAST BP <80 MM HG: CPT | Performed by: FAMILY MEDICINE

## 2025-04-21 PROCEDURE — 83036 HEMOGLOBIN GLYCOSYLATED A1C: CPT | Performed by: FAMILY MEDICINE

## 2025-04-21 RX ORDER — CETIRIZINE HYDROCHLORIDE 10 MG/1
10 TABLET ORAL DAILY
Qty: 90 TABLET | Refills: 1 | Status: SHIPPED | OUTPATIENT
Start: 2025-04-21

## 2025-04-21 RX ORDER — NICOTINE 21 MG/24HR
1 PATCH, TRANSDERMAL 24 HOURS TRANSDERMAL EVERY 24 HOURS
Qty: 30 PATCH | Refills: 3 | Status: SHIPPED | OUTPATIENT
Start: 2025-04-21 | End: 2025-05-21

## 2025-04-21 RX ORDER — FLUTICASONE PROPIONATE 50 MCG
1 SPRAY, SUSPENSION (ML) NASAL DAILY
Qty: 15.8 ML | Refills: 0 | Status: SHIPPED | OUTPATIENT
Start: 2025-04-21

## 2025-04-21 NOTE — ASSESSMENT & PLAN NOTE
Lab Results   Component Value Date    HGBA1C 6.9 (H) 11/20/2024     Home meds: jardiance 10  mg qd

## 2025-04-21 NOTE — ASSESSMENT & PLAN NOTE
/60 mmhg     -Continue entresto 24-26 mg qd  -Coreg 6.25 mg bid  -Continue low sodium diet   -F/U w/ PCP in 3 months

## 2025-04-21 NOTE — ASSESSMENT & PLAN NOTE
Exacerbated due to pollen season   Orders:    cetirizine (ZyrTEC) 10 mg tablet; Take 1 tablet (10 mg total) by mouth daily    fluticasone (FLONASE) 50 mcg/act nasal spray; 1 spray into each nostril daily

## 2025-04-21 NOTE — ASSESSMENT & PLAN NOTE
-Last cigarette use was 7 months ago  -Well controlled on nicotine patch   Orders:    nicotine (NICODERM CQ) 21 mg/24 hr TD 24 hr patch; Place 1 patch on the skin over 24 hours every 24 hours

## 2025-04-21 NOTE — PROGRESS NOTES
Name: Ruben Flores      : 1955      MRN: [id number]  Encounter Provider: Yanira Dickey MD  Encounter Date: 2025   Encounter department: [facility]  :  Assessment & Plan  Type 2 diabetes mellitus without complication, without long-term current use of insulin (HCC)    Lab Results   Component Value Date    HGBA1C 6.7 (A) 2025     -Due for diabetic eye exam   -Continue jardiance 10 mg qd  -Continue diabetic diet  -F/U in 3 months     Orders:    POCT hemoglobin A1c    Primary hypertension  /60 mmhg     -Continue entresto 24-26 mg qd  -Coreg 6.25 mg bid  -Continue low sodium diet   -F/U w/ PCP in 3 months        Seasonal allergies  Exacerbated due to pollen season   Orders:    cetirizine (ZyrTEC) 10 mg tablet; Take 1 tablet (10 mg total) by mouth daily    fluticasone (FLONASE) 50 mcg/act nasal spray; 1 spray into each nostril daily    Tobacco user  -Last cigarette use was 7 months ago  -Well controlled on nicotine patch   Orders:    nicotine (NICODERM CQ) 21 mg/24 hr TD 24 hr patch; Place 1 patch on the skin over 24 hours every 24 hours    Colon cancer screening    Orders:    Ambulatory Referral to Gastroenterology; Future           History of Present Illness   70 yo male with a pmh of HTN and DM2 presents to follow up on his chronic conditions. He reports he follows a diet low in sugars and salts. Patient reports he has suffered from seasonal allergies for many years and recently due to the pollen it has been getting worse. He states he currently has constant sneezing, nose itchiness and teary eyes.  Patient denies fevers, cough, sob, chest pain, paraesthesias, n/v/d/c    Diabetes  Pertinent negatives for hypoglycemia include no seizures. Pertinent negatives for diabetes include no chest pain.     Review of Systems   Constitutional:  Negative for chills and fever.   HENT:  Negative for ear pain and sore throat.    Eyes:  Positive for itching.  "Negative for pain and visual disturbance.   Respiratory:  Negative for cough and shortness of breath.    Cardiovascular:  Negative for chest pain and palpitations.   Gastrointestinal:  Negative for abdominal pain and vomiting.   Genitourinary:  Negative for dysuria and hematuria.   Musculoskeletal:  Negative for arthralgias and back pain.   Skin:  Negative for color change and rash.   Neurological:  Negative for seizures and syncope.   All other systems reviewed and are negative.      Objective   /60 (BP Location: Right arm, Patient Position: Sitting, Cuff Size: Standard)   Pulse 82   Temp (!) 96.8 °F (36 °C) (Temporal)   Resp 16   Ht 5' 5\" (1.651 m)   Wt 69.4 kg (153 lb)   SpO2 100%   BMI 25.46 kg/m²      Physical Exam  Vitals and nursing note reviewed.   Constitutional:       General: He is not in acute distress.     Appearance: He is well-developed.   HENT:      Head: Normocephalic and atraumatic.      Right Ear: Tympanic membrane, ear canal and external ear normal.      Left Ear: Tympanic membrane, ear canal and external ear normal.      Nose: Nose normal.      Right Turbinates: Enlarged and swollen.      Left Turbinates: Enlarged and swollen.   Eyes:      Conjunctiva/sclera: Conjunctivae normal.   Cardiovascular:      Rate and Rhythm: Normal rate and regular rhythm.      Heart sounds: No murmur heard.  Pulmonary:      Effort: Pulmonary effort is normal. No respiratory distress.      Breath sounds: Normal breath sounds.   Abdominal:      Palpations: Abdomen is soft.      Tenderness: There is no abdominal tenderness.   Musculoskeletal:         General: No swelling.      Cervical back: Neck supple.   Skin:     General: Skin is warm and dry.      Capillary Refill: Capillary refill takes less than 2 seconds.   Neurological:      Mental Status: He is alert.   Psychiatric:         Mood and Affect: Mood normal.         "

## 2025-04-21 NOTE — ASSESSMENT & PLAN NOTE
Lab Results   Component Value Date    HGBA1C 6.7 (A) 04/21/2025     -Due for diabetic eye exam   -Continue jardiance 10 mg qd  -Continue diabetic diet  -F/U in 3 months     Orders:    POCT hemoglobin A1c

## 2025-05-01 ENCOUNTER — APPOINTMENT (OUTPATIENT)
Dept: LAB | Facility: CLINIC | Age: 70
End: 2025-05-01
Payer: COMMERCIAL

## 2025-05-01 DIAGNOSIS — Z12.5 PROSTATE CANCER SCREENING: ICD-10-CM

## 2025-05-01 DIAGNOSIS — E78.2 MIXED HYPERLIPIDEMIA: ICD-10-CM

## 2025-05-01 DIAGNOSIS — R97.20 ELEVATED PSA: ICD-10-CM

## 2025-05-01 LAB
CHOLEST SERPL-MCNC: 109 MG/DL (ref ?–200)
HDLC SERPL-MCNC: 49 MG/DL
LDLC SERPL CALC-MCNC: 27 MG/DL (ref 0–100)
PSA FREE MFR SERPL: 12.64 %
PSA FREE SERPL-MCNC: 0.95 NG/ML
PSA SERPL-MCNC: 7.52 NG/ML (ref 0–4)
TRIGL SERPL-MCNC: 164 MG/DL (ref ?–150)

## 2025-05-01 PROCEDURE — 36415 COLL VENOUS BLD VENIPUNCTURE: CPT

## 2025-05-01 PROCEDURE — 84154 ASSAY OF PSA FREE: CPT

## 2025-05-01 PROCEDURE — 84153 ASSAY OF PSA TOTAL: CPT

## 2025-05-01 PROCEDURE — 80061 LIPID PANEL: CPT

## 2025-05-05 ENCOUNTER — RESULTS FOLLOW-UP (OUTPATIENT)
Dept: CARDIOLOGY CLINIC | Facility: CLINIC | Age: 70
End: 2025-05-05

## 2025-05-06 NOTE — PROGRESS NOTES
5/7/2025      Chief Complaint   Patient presents with   • Follow-up     3 month f/u       Assessment and Plan    69 y.o. male managed by Ap team       1. Elevated PSA  Assessment & Plan:    Most recent PSA level remains elevated from previous PSA level   KRIS- no firmess or nodules noted on exam   Given the continued elevation of PSA level I recommend we move forward with a multiparametric MRI for further evaluation.  We additionally discussed that this is utilized as a screening tool.  A biopsy of the prostate is the only definitive way to diagnose prostate cancer.  PI-RADS category discussed with patient.  Patient understands if PI-RADS category is concerning that a biopsy would be the next step.   Patient would like to move forward with a multiparametric MRI  Plan for OV to review mp MRI once obtained       Lab Results   Component Value Date    PSA 7.522 (H) 05/01/2025    PSA 6.543 (H) 01/13/2025     Orders:  -     MRI prostate multiparametric wo w contrast; Future  2. Benign prostatic hyperplasia with urinary frequency  Assessment & Plan:    Difficulty urinating with urinary frequency  Renal US- noted prostate enlargement, otherwise normal kidneys   PVR- 40  UA- negative for blood and infection   Patient was started on Flomax patient reports much improvement with urinary stream  Content with medication  Plan to continue    History of Present Illness  Ruben Flores is a 69 y.o. male here for evaluation of elevated PSA level. Only 1 PSA level on file. Patient reports never having PSA checked before. He denies family history of prostate cancer. He does report some urinary frequency and some difficulty with urination. Does have a history of type 2 diabetes and is noted to have high glucose levels. He is also on Jardiance. A renal ultrasound was performed due to having microscopic hematuria which noted kidneys as normal but an enlarged prostate. No UA with micro noted on file. Patient does report a positive  "smoking history of 9 years. He reports smoking 5 cigarettes/day. He quit about 3 months ago. He denies a history of nephrolithiasis. He denies flank pain or gross hematuria.     Today:   Patient started on Flomax for difficulty with urination and urinary frequency.  He reports since starting medication much improvement with his urinary symptoms.  His repeat PSA continue to rise.  Denies family history of prostate cancer.     services used.        Review of Systems   Constitutional:  Negative for chills and fever.   HENT:  Negative for ear pain and sore throat.    Eyes:  Negative for pain and visual disturbance.   Respiratory:  Negative for cough and shortness of breath.    Cardiovascular:  Negative for chest pain and palpitations.   Gastrointestinal:  Negative for abdominal pain and vomiting.   Genitourinary:  Negative for difficulty urinating, dysuria, flank pain, frequency and hematuria.   Musculoskeletal:  Negative for arthralgias and back pain.   Skin:  Negative for color change and rash.   Neurological:  Negative for seizures and syncope.   All other systems reviewed and are negative.               Vitals  Vitals:    05/07/25 0948   BP: 122/76   BP Location: Left arm   Patient Position: Sitting   Cuff Size: Adult   Pulse: 87   SpO2: 95%   Weight: 68.9 kg (152 lb)   Height: 5' 5\" (1.651 m)       Physical Exam      Past History  Past Medical History:   Diagnosis Date   • HLD (hyperlipidemia)    • Hypertension      Social History     Socioeconomic History   • Marital status: /Civil Union     Spouse name: None   • Number of children: None   • Years of education: None   • Highest education level: None   Occupational History   • None   Tobacco Use   • Smoking status: Former     Types: Cigarettes     Start date: 2024     Passive exposure: Current   • Smokeless tobacco: Former   Vaping Use   • Vaping status: Former   • Substances: Flavoring   Substance and Sexual Activity   • Alcohol use: Not " Currently   • Drug use: Not Currently     Comment: past use of Cocaine   • Sexual activity: None   Other Topics Concern   • None   Social History Narrative   • None     Social Drivers of Health     Financial Resource Strain: Medium Risk (2024)    Overall Financial Resource Strain (CARDIA)    • Difficulty of Paying Living Expenses: Somewhat hard   Food Insecurity: No Food Insecurity (2024)    Nursing - Inadequate Food Risk Classification    • Worried About Running Out of Food in the Last Year: Not on file    • Ran Out of Food in the Last Year: Not on file    • Ran Out of Food in the Last Year: Never true   Transportation Needs: No Transportation Needs (2024)    Nursing - Transportation Risk Classification    • Lack of Transportation: Not on file    • Lack of Transportation: No   Physical Activity: Not on file   Stress: Not on file   Social Connections: Not on file   Intimate Partner Violence: Unknown (2024)    Nursing IPS    • Feels Physically and Emotionally Safe: Not on file    • Physically Hurt by Someone: Not on file    • Humiliated or Emotionally Abused by Someone: Not on file    • Physically Hurt by Someone: No    • Hurt or Threatened by Someone: No   Housing Stability: Unknown (2024)    Nursing: Inadequate Housing Risk Classification    • Has Housing: Not on file    • Worried About Losing Housing: Not on file    • Unable to Get Utilities: Not on file    • Unable to Pay for Housing in the Last Year: No    • Has Housin     Social History     Tobacco Use   Smoking Status Former   • Types: Cigarettes   • Start date:    • Passive exposure: Current   Smokeless Tobacco Former     History reviewed. No pertinent family history.    The following portions of the patient's history were reviewed and updated as appropriate: allergies, current medications, past medical history, past social history, past surgical history and problem list.    Results  No results found for this or any  previous visit (from the past hour).]  Lab Results   Component Value Date    PSA 7.522 (H) 05/01/2025    PSA 6.543 (H) 01/13/2025     Lab Results   Component Value Date    CALCIUM 9.4 01/23/2025    K 4.0 01/23/2025    CO2 31 01/23/2025    CL 94 (L) 01/23/2025    BUN 11 01/23/2025    CREATININE 0.86 01/23/2025     Lab Results   Component Value Date    WBC 9.75 01/23/2025    HGB 14.4 01/23/2025    HCT 43.2 01/23/2025    MCV 85 01/23/2025     01/23/2025

## 2025-05-07 ENCOUNTER — OFFICE VISIT (OUTPATIENT)
Dept: UROLOGY | Facility: CLINIC | Age: 70
End: 2025-05-07
Payer: COMMERCIAL

## 2025-05-07 VITALS
BODY MASS INDEX: 25.33 KG/M2 | SYSTOLIC BLOOD PRESSURE: 122 MMHG | DIASTOLIC BLOOD PRESSURE: 76 MMHG | WEIGHT: 152 LBS | OXYGEN SATURATION: 95 % | HEIGHT: 65 IN | HEART RATE: 87 BPM

## 2025-05-07 DIAGNOSIS — N40.1 BENIGN PROSTATIC HYPERPLASIA WITH URINARY FREQUENCY: ICD-10-CM

## 2025-05-07 DIAGNOSIS — R35.0 BENIGN PROSTATIC HYPERPLASIA WITH URINARY FREQUENCY: ICD-10-CM

## 2025-05-07 DIAGNOSIS — R97.20 ELEVATED PSA: Primary | ICD-10-CM

## 2025-05-07 PROCEDURE — 99213 OFFICE O/P EST LOW 20 MIN: CPT

## 2025-05-07 NOTE — ASSESSMENT & PLAN NOTE
Difficulty urinating with urinary frequency  Renal US- noted prostate enlargement, otherwise normal kidneys   PVR- 40  UA- negative for blood and infection   Patient was started on Flomax patient reports much improvement with urinary stream  Content with medication  Plan to continue

## 2025-05-07 NOTE — ASSESSMENT & PLAN NOTE
Most recent PSA level remains elevated from previous PSA level   KRIS- no firmess or nodules noted on exam   Given the continued elevation of PSA level I recommend we move forward with a multiparametric MRI for further evaluation.  We additionally discussed that this is utilized as a screening tool.  A biopsy of the prostate is the only definitive way to diagnose prostate cancer.  PI-RADS category discussed with patient.  Patient understands if PI-RADS category is concerning that a biopsy would be the next step.   Patient would like to move forward with a multiparametric MRI  Plan for OV to review mp MRI once obtained       Lab Results   Component Value Date    PSA 7.522 (H) 05/01/2025    PSA 6.543 (H) 01/13/2025

## 2025-05-21 ENCOUNTER — HOSPITAL ENCOUNTER (OUTPATIENT)
Facility: MEDICAL CENTER | Age: 70
Discharge: HOME | End: 2025-05-21
Payer: COMMERCIAL

## 2025-05-21 DIAGNOSIS — R97.20 ELEVATED PSA: ICD-10-CM

## 2025-05-21 PROCEDURE — A9585 GADOBUTROL INJECTION: HCPCS

## 2025-05-21 PROCEDURE — 76377 3D RENDER W/INTRP POSTPROCES: CPT

## 2025-05-21 PROCEDURE — 72197 MRI PELVIS W/O & W/DYE: CPT

## 2025-05-21 RX ORDER — GADOBUTROL 604.72 MG/ML
6 INJECTION INTRAVENOUS
Status: COMPLETED | OUTPATIENT
Start: 2025-05-21 | End: 2025-05-21

## 2025-05-21 RX ADMIN — GADOBUTROL 6 ML: 604.72 INJECTION INTRAVENOUS at 10:58

## 2025-05-27 NOTE — PROGRESS NOTES
5/28/2025      Chief Complaint   Patient presents with   • Follow-up     Discuss MRI         Assessment and Plan    69 y.o. male managed by Ap team     1. Abnormal MRI  Assessment & Plan:  Seen in consultation for elevated PSA which followed with a multiparametric MRI which showed:  PI-RADSv2.1 Category 4 - High (clinically significant cancer is likely to be present). A 0.9 cm left apical posterolateral peripheral zone lesion.   Reviewed imaging with patient today  Plan to proceed  with transperineal fusion biopsy in the OR  Reviewed procedure in detail along with recovery  Patient aware of 2-week pathology review then with MD  Case request placed  Surgical scheduler will be reaching out for potential biopsy dates-patient aware      Lab Results   Component Value Date    PSA 7.522 (H) 05/01/2025    PSA 6.543 (H) 01/13/2025     Orders:  -     Case request operating room: TRANSPERINEAL MRI FUSION BIOPSY PROSTATE; Standing  -     Case request operating room: TRANSPERINEAL MRI FUSION BIOPSY PROSTATE  2. Elevated PSA          History of Present Illness  Ruben Flores is a 69 y.o. male here for evaluation of elevated PSA with abnormal MRI.  Patient was originally seen in consultation for an elevated PSA of 6.5 on upon repeat PSA was then 7.5.  He did obtain a multiparametric MRI which showed PI-RADS category 4.    He does have a history of type 2 diabetes and is noted to have high glucose levels.  He is also on Jardiance.    Patient does report a positive smoking history of 9 years. He reports smoking 5 cigarettes/day. He quit about 6 months ago.  UA with micro was negative for RBCs.  Patient was additionally having issues with urination was started on Flomax.  Send starting medication he reports much improvement with urinary stream and reports being content with medication at this time.  Ultrasound was completed which noted prostate enlargement otherwise normal kidneys and bladder.  PVR in the past has been  "40.        Lab Results   Component Value Date    HGBA1C 6.7 (A) 04/21/2025             Review of Systems   Constitutional:  Negative for chills and fever.   HENT:  Negative for ear pain and sore throat.    Eyes:  Negative for pain and visual disturbance.   Respiratory:  Negative for cough and shortness of breath.    Cardiovascular:  Negative for chest pain and palpitations.   Gastrointestinal:  Negative for abdominal pain and vomiting.   Genitourinary:  Negative for difficulty urinating, dysuria, flank pain, frequency, hematuria and urgency.   Musculoskeletal:  Negative for arthralgias and back pain.   Skin:  Negative for color change and rash.   Neurological:  Negative for seizures and syncope.   All other systems reviewed and are negative.               Vitals  Vitals:    05/28/25 1517   BP: 136/82   BP Location: Left arm   Patient Position: Standing   Cuff Size: Adult   Pulse: 89   SpO2: 99%   Weight: 70.8 kg (156 lb)   Height: 5' 5\" (1.651 m)       Physical Exam  Vitals reviewed.   Constitutional:       Appearance: Normal appearance.   HENT:      Head: Normocephalic and atraumatic.     Eyes:      Conjunctiva/sclera: Conjunctivae normal.     Pulmonary:      Effort: Pulmonary effort is normal.   Abdominal:      General: Abdomen is flat. There is no distension.      Palpations: Abdomen is soft.      Tenderness: There is no abdominal tenderness.     Musculoskeletal:         General: Normal range of motion.      Cervical back: Normal range of motion.     Skin:     General: Skin is warm and dry.     Neurological:      General: No focal deficit present.      Mental Status: He is alert and oriented to person, place, and time.     Psychiatric:         Mood and Affect: Mood normal.         Behavior: Behavior normal.         Thought Content: Thought content normal.         Judgment: Judgment normal.           Past History  Past Medical History[1]  Social History[2]  Tobacco Use History[3]  Family History[4]    The " following portions of the patient's history were reviewed and updated as appropriate: allergies, current medications, past medical history, past social history, past surgical history and problem list.    Results  No results found for this or any previous visit (from the past hour).]  Lab Results   Component Value Date    PSA 7.522 (H) 05/01/2025    PSA 6.543 (H) 01/13/2025     Lab Results   Component Value Date    CALCIUM 9.4 01/23/2025    K 4.0 01/23/2025    CO2 31 01/23/2025    CL 94 (L) 01/23/2025    BUN 11 01/23/2025    CREATININE 0.86 01/23/2025     Lab Results   Component Value Date    WBC 9.75 01/23/2025    HGB 14.4 01/23/2025    HCT 43.2 01/23/2025    MCV 85 01/23/2025     01/23/2025              [1]  Past Medical History:  Diagnosis Date   • HLD (hyperlipidemia)    • Hypertension    [2]  Social History  Socioeconomic History   • Marital status: /Civil Union   Tobacco Use   • Smoking status: Former     Types: Cigarettes     Start date: 2024     Passive exposure: Current   • Smokeless tobacco: Former   Vaping Use   • Vaping status: Former   • Substances: Flavoring   Substance and Sexual Activity   • Alcohol use: Not Currently   • Drug use: Not Currently     Comment: past use of Cocaine     Social Drivers of Health     Financial Resource Strain: Medium Risk (4/26/2024)    Overall Financial Resource Strain (CARDIA)    • Difficulty of Paying Living Expenses: Somewhat hard   Food Insecurity: No Food Insecurity (11/20/2024)    Nursing - Inadequate Food Risk Classification    • Ran Out of Food in the Last Year: Never true   Transportation Needs: No Transportation Needs (11/20/2024)    Nursing - Transportation Risk Classification    • Lack of Transportation: No   Intimate Partner Violence: Unknown (11/20/2024)    Nursing IPS    • Physically Hurt by Someone: No    • Hurt or Threatened by Someone: No   Housing Stability: Unknown (11/20/2024)    Nursing: Inadequate Housing Risk Classification    • Unable  to Pay for Housing in the Last Year: No    • Has Housin   [3]  Social History  Tobacco Use   Smoking Status Former   • Types: Cigarettes   • Start date:    • Passive exposure: Current   Smokeless Tobacco Former   [4]  No family history on file.

## 2025-05-28 ENCOUNTER — OFFICE VISIT (OUTPATIENT)
Dept: UROLOGY | Facility: CLINIC | Age: 70
End: 2025-05-28
Payer: COMMERCIAL

## 2025-05-28 VITALS
OXYGEN SATURATION: 99 % | HEIGHT: 65 IN | DIASTOLIC BLOOD PRESSURE: 82 MMHG | BODY MASS INDEX: 25.99 KG/M2 | HEART RATE: 89 BPM | WEIGHT: 156 LBS | SYSTOLIC BLOOD PRESSURE: 136 MMHG

## 2025-05-28 DIAGNOSIS — R93.89 ABNORMAL MRI: Primary | ICD-10-CM

## 2025-05-28 DIAGNOSIS — R97.20 ELEVATED PSA: ICD-10-CM

## 2025-05-28 PROCEDURE — 99213 OFFICE O/P EST LOW 20 MIN: CPT

## 2025-05-28 NOTE — ASSESSMENT & PLAN NOTE
Seen in consultation for elevated PSA which followed with a multiparametric MRI which showed:  PI-RADSv2.1 Category 4 - High (clinically significant cancer is likely to be present). A 0.9 cm left apical posterolateral peripheral zone lesion.   Reviewed imaging with patient today  Plan to proceed  with transperineal fusion biopsy in the OR  Reviewed procedure in detail along with recovery  Patient aware of 2-week pathology review then with MD  Case request placed  Surgical scheduler will be reaching out for potential biopsy dates-patient aware      Lab Results   Component Value Date    PSA 7.522 (H) 05/01/2025    PSA 6.543 (H) 01/13/2025

## 2025-06-06 ENCOUNTER — PREP FOR PROCEDURE (OUTPATIENT)
Dept: UROLOGY | Facility: MEDICAL CENTER | Age: 70
End: 2025-06-06

## 2025-06-06 ENCOUNTER — TELEPHONE (OUTPATIENT)
Dept: UROLOGY | Facility: MEDICAL CENTER | Age: 70
End: 2025-06-06

## 2025-06-06 DIAGNOSIS — Z01.810 PRE-OPERATIVE CARDIOVASCULAR EXAMINATION: ICD-10-CM

## 2025-06-06 DIAGNOSIS — R39.89 SUSPECTED UTI: ICD-10-CM

## 2025-06-06 DIAGNOSIS — R97.20 ELEVATED PSA: Primary | ICD-10-CM

## 2025-06-06 DIAGNOSIS — Z01.812 PRE-OPERATIVE LABORATORY EXAMINATION: ICD-10-CM

## 2025-06-06 NOTE — TELEPHONE ENCOUNTER
Spoke with patient and confirmed surgery date of: 7/21/25  Type of surgery: MRI FUSION BX  Operating physician: DR. OCHOA  Location of surgery: [facility]     Verbally went over prep with patient on: 6/6/25  NPO   Bowel prep? Yes, 1 enema 1 hour prior to leaving the house for the procedure   Hospital calls afternoon prior with arrival time -Calls Friday afternoon for Monday surgeries   Patient needs ride to and from surgery (outpatient)    Pre-op testing to be done 2 weeks prior to surgery    CBC,CMP,U/C,EKG  Blood thinners: ASPIRIN 81 MG    Clearances needed: NONE     Mailed packet on:   Copy of packet scanned into Media on:   Labs in packet   Soap instructions in packet    Post-op in packet   H&P on admit   PO Pt requested only Mon/Weds, best I could find 9/8, 3pm, Dr. Garcia, [place]. Pt confirmed this was good.        Consent: On admit

## 2025-06-13 NOTE — TELEPHONE ENCOUNTER
Pt called with  [phone] and stated he needs to obtain a liquid needed prior to his procedure and he needs to know how to obtain it. Pt did not know if it was soap or a bowel prep. Asking for a direct call back with clarification.     Call back: [phone]    18 18

## 2025-06-30 NOTE — PROGRESS NOTES
Ruben Flores  1955  [id number]  CARDIOVASC PHYSICIAN  [address] [facility] [address]  [phone]  [phone]    1. Primary hypertension        2. Mixed hyperlipidemia  atorvastatin (LIPITOR) 40 mg tablet      3. Chronic combined systolic and diastolic congestive heart failure (HCC)  carvedilol (COREG) 6.25 mg tablet    Empagliflozin (Jardiance) 10 MG TABS tablet    sacubitril-valsartan (ENTRESTO) 24-26 MG TABS    Basic metabolic panel      4. Type 2 diabetes mellitus without complication, without long-term current use of insulin (HCC)              Assessment/Plan  TODAY (07/07/25):   Euvolemic on exam today with no edema or rales heard in lungs. Would continue regimen as outlined below. Advised to call the office with weight gain or any concerns.   BP well controlled today, continue current regimen   Repeat lipid panel with LDL at 27. Continue current dose of atorvastatin   Encouraged continued smoking cessation   RTO 01/14/26 with Dr. Acosta, former Dr. Garrison patient.    Chronic combined systolic and diastolic heart failure, etiology NICM  - nonobstructive CAD per cath at [facility] 04/14/17  - TTE 03/03/25: LVEF 39% with reduced global longitudinal strain and moderate global hypokinesis, grade I DD, mild LAE   - discharge weight 04/07/25: 156 lbs   - office weight today: 150 lbs  - Neurohormonal Blockade:   -- beta blocker: coreg 6.25 mg BID  -- ACE/ARB/ARNi: entresto 24-26 mg BID  -- aldosterone antagonist: none   -- SGLT2: jardiance 10 mg daily  -- diuretic: none   Hypertension   - BP in office: 122/80 mmHg  - current rx: entresto 24-26 mg BID and coreg 6.25 mg BID  Mixed hyperlipidemia   - lipid panel 05/01/25: cholesterol 109, triglycerides 164, HDL 49, LDL 27  - current rx: atorvastatin 40 mg daily  Tobacco use     Interval History:   This is a 71 y/o male with a PMH of chronic combined CHF (etiology NICM), HTN, HLD, and tobacco use who is presenting today for routine follow up. He was last seen  in office 04/07/25 by Dr. Garrison. At this visit, he was doing overall well and euvolemic on exam.    Pt stats he has been doing overall well since he was last seen in the office. He is taking all his medications as directed without any side effects. Weights are stable and BP well controlled. States he is staying active during the summer months. Denies chest pain, SOB/MARTINEZ, lightheadedness, dizziness, edema, abdominal bloating. Medications refilled today. No cardiac complaints. States no longer smoking tobacco       Past Medical History:   Diagnosis Date   • HLD (hyperlipidemia)    • Hypertension      Social History     Socioeconomic History   • Marital status: /Civil Union     Spouse name: Not on file   • Number of children: Not on file   • Years of education: Not on file   • Highest education level: Not on file   Occupational History   • Not on file   Tobacco Use   • Smoking status: Former     Types: Cigarettes     Start date: 2024     Passive exposure: Current   • Smokeless tobacco: Former   Vaping Use   • Vaping status: Former   • Substances: Flavoring   Substance and Sexual Activity   • Alcohol use: Not Currently   • Drug use: Not Currently     Comment: past use of Cocaine   • Sexual activity: Not on file   Other Topics Concern   • Not on file   Social History Narrative   • Not on file     Social Drivers of Health     Financial Resource Strain: Medium Risk (4/26/2024)    Overall Financial Resource Strain (CARDIA)    • Difficulty of Paying Living Expenses: Somewhat hard   Food Insecurity: No Food Insecurity (11/20/2024)    Nursing - Inadequate Food Risk Classification    • Worried About Running Out of Food in the Last Year: Not on file    • Ran Out of Food in the Last Year: Not on file    • Ran Out of Food in the Last Year: Never true   Transportation Needs: No Transportation Needs (11/20/2024)    Nursing - Transportation Risk Classification    • Lack of Transportation: Not on file    • Lack of  Transportation: No   Physical Activity: Not on file   Stress: Not on file   Social Connections: Not on file   Intimate Partner Violence: Unknown (11/20/2024)    Nursing IPS    • Feels Physically and Emotionally Safe: Not on file    • Physically Hurt by Someone: Not on file    • Humiliated or Emotionally Abused by Someone: Not on file    • Physically Hurt by Someone: No    • Hurt or Threatened by Someone: No   Housing Stability: Unknown (11/20/2024)    Nursing: Inadequate Housing Risk Classification    • Has Housing: Not on file    • Worried About Losing Housing: Not on file    • Unable to Get Utilities: Not on file    • Unable to Pay for Housing in the Last Year: No    • Has Housing: No      No family history on file.  No past surgical history on file.    Current Outpatient Medications:   •  aspirin (Aspirin Low Dose) 81 mg EC tablet, Take 1 tablet (81 mg total) by mouth daily, Disp: 90 tablet, Rfl: 3  •  atorvastatin (LIPITOR) 40 mg tablet, Take 1 tablet (40 mg total) by mouth daily at bedtime, Disp: 90 tablet, Rfl: 3  •  carvedilol (COREG) 6.25 mg tablet, Take 1 tablet (6.25 mg total) by mouth 2 (two) times a day with meals, Disp: 180 tablet, Rfl: 3  •  cetirizine (ZyrTEC) 10 mg tablet, Take 1 tablet (10 mg total) by mouth daily, Disp: 90 tablet, Rfl: 1  •  Empagliflozin (Jardiance) 10 MG TABS tablet, Take 1 tablet (10 mg total) by mouth every morning, Disp: 90 tablet, Rfl: 3  •  fluticasone (FLONASE) 50 mcg/act nasal spray, 1 spray into each nostril daily, Disp: 15.8 mL, Rfl: 0  •  nicotine (NICODERM CQ) 21 mg/24 hr TD 24 hr patch, Place 1 patch on the skin over 24 hours every 24 hours, Disp: 30 patch, Rfl: 3  •  sacubitril-valsartan (ENTRESTO) 24-26 MG TABS, Take 1 tablet by mouth 2 (two) times a day, Disp: 180 tablet, Rfl: 3  •  tamsulosin (FLOMAX) 0.4 mg, Take 1 capsule (0.4 mg total) by mouth daily with dinner, Disp: 90 capsule, Rfl: 1  •  melatonin 3 mg, Take 1 tablet (3 mg total) by mouth daily at bedtime  (Patient not taking: Reported on 7/7/2025), Disp: 15 tablet, Rfl: 0  Allergies   Allergen Reactions   • Pollen Extract Itching   • Ioversol Rash       Labs:     Chemistry        Component Value Date/Time    K 4.0 01/23/2025 0544    CL 94 (L) 01/23/2025 0544    CO2 31 01/23/2025 0544    BUN 11 01/23/2025 0544    CREATININE 0.86 01/23/2025 0544        Component Value Date/Time    CALCIUM 9.4 01/23/2025 0544    ALKPHOS 128 (H) 01/23/2025 0544    AST 27 01/23/2025 0544    ALT 19 01/23/2025 0544          Lab Results   Component Value Date    HDL 49 05/01/2025    HDL 59 01/30/2024    HDL 42 05/20/2021     Lab Results   Component Value Date    LDLCALC 27 05/01/2025    LDLCALC 92 01/30/2024    LDLCALC 132 (H) 05/20/2021     Lab Results   Component Value Date    TRIG 164 (H) 05/01/2025    TRIG 125 01/30/2024    TRIG 205 (H) 05/20/2021     Imaging: XR chest pa and lateral  Result Date: 11/24/2024  Narrative: XR CHEST PA AND LATERAL INDICATION: CHF. COMPARISON: Chest radiograph 11/20/2024 FINDINGS: There is prominence of the interstitial markings. There is a left basilar opacity. There is mild blunting of the bilateral costophrenic angles. No pneumothorax. . Normal cardiomediastinal silhouette. Bones are unremarkable for age. Normal upper abdomen.     Impression: 1. Prominence of the interstitial markings suggesting prominent pulmonary interstitial edema. 2. Retrocardiac opacity may represent atelectasis versus pneumonia. Clinical and laboratory correlation is recommended. 3. Small bilateral pleural effusions. Workstation performed: [id number]     Echo complete w/ contrast if indicated  Result Date: 11/22/2024  Narrative: •  Left Ventricle: Left ventricular cavity size is normal. Wall thickness is normal. The left ventricular ejection fraction is 27% by visual estimation. Systolic function is severely reduced. Global longitudinal strain is reduced at -9%.  Strain shows some degree of apical sparing.  Can consider additional  workup as clinically indicated. There is severe global hypokinesis with regional variation. Diastolic function is severely abnormal, consistent with ungraded restrictive relaxation.  Left atrial filling pressure is elevated. •  Right Ventricle: Right ventricular cavity size is normal. Systolic function is low normal. •  Left Atrium: The atrium is severely dilated. •  Aortic Valve: There is trace regurgitation. •  Mitral Valve: There is mild annular calcification. There is mild regurgitation. •  Tricuspid Valve: There is mild to moderate regurgitation. Pulmonary artery systolic pressures are estimated at 59 mmHg. •  There is no study for comparison.     XR chest 2 views  Result Date: 11/20/2024  Narrative: AP CHEST RADIOGRAPH CTA - CHEST WITH IV CONTRAST - PULMONARY ANGIOGRAM INDICATION: cp,sob, elevated d-dimer. COMPARISON: Chest radiograph dated 11/20/2024 TECHNIQUE: Initially, semiupright AP chest radiograph was performed. Subsequently, CTA examination of the chest was performed using angiographic technique according to a protocol specifically tailored to evaluate for pulmonary embolism. Multiplanar 2D reformatted images were created from the source data. In addition, coronal 3D MIP postprocessing was performed on the acquisition scanner. Radiation dose length product (DLP) for this visit: CT--406 mGy-cm  (accession [id number]), XR--0 mGy  (accession [id number]). This examination, like all CT scans performed in the [facility], was performed utilizing techniques to minimize radiation dose exposure, including the use of iterative reconstruction and automated exposure control. IV Contrast: 80 mL of iohexol (OMNIPAQUE) FINDINGS: --CHEST RADIOGRAPH-- Mild prominence of the cardiomediastinal silhouette even when accounting for portable technique. Pulmonary vasculature is engorged and indistinct. Generalized increase in peripheral reticular lung markings (Kerley B lines). Patchy central-predominant opacities  "in both lungs. No radiographic effusions evident, though present on subsequent CT. No pneumothorax. Bones are unremarkable. --PULMONARY CTA-- PULMONARY ARTERIAL TREE: Central pulmonary arteries are normal in caliber. No pulmonary emboli demonstrated. \"Supine cephalization\" with disproportionate prominence of the anterior pulmonary vasculature. LUNGS: Reticular interlobular septal thickening and central-predominant symmetric groundglass opacities in keeping with interstitial and alveolar edema, respectively. No focal pneumonic consolidation. Mild diffuse airway thickening also attributable to third spacing. No concerning endobronchial lesions. No suspicious pulmonary nodules or masses. PLEURA: Moderate bilateral layering pleural effusions without loculated components. HEART/GREAT VESSELS: Mild four-chamber enlargement of the heart. No pericardial effusion. No thoracic aortic aneurysm. MEDIASTINUM AND ESTELA: Unremarkable. CHEST WALL AND LOWER NECK: Unremarkable. VISUALIZED STRUCTURES IN THE UPPER ABDOMEN: Question gallstone at the proximal body on lower-most image without visualized findings concerning for cholecystitis. No acute findings in the imaged portion of the upper abdomen. OSSEOUS STRUCTURES: Old, healed posterior left rib fractures. No acute fractures or destructive osseous lesions.     Impression: 1.  Constellation of findings suggesting volume overload with bilateral moderate pleural effusions, pulmonary vascular congestion, and interstitial and alveolar edema. 2.  No pulmonary emboli. Workstation performed: [id number]     CTA chest pe study  Result Date: 11/20/2024  Narrative: AP CHEST RADIOGRAPH CTA - CHEST WITH IV CONTRAST - PULMONARY ANGIOGRAM INDICATION: cp,sob, elevated d-dimer. COMPARISON: Chest radiograph dated 11/20/2024 TECHNIQUE: Initially, semiupright AP chest radiograph was performed. Subsequently, CTA examination of the chest was performed using angiographic technique according to a protocol " "specifically tailored to evaluate for pulmonary embolism. Multiplanar 2D reformatted images were created from the source data. In addition, coronal 3D MIP postprocessing was performed on the acquisition scanner. Radiation dose length product (DLP) for this visit: CT--406 mGy-cm  (accession [id number]), XR--0 mGy  (accession [id number]). This examination, like all CT scans performed in the [facility], was performed utilizing techniques to minimize radiation dose exposure, including the use of iterative reconstruction and automated exposure control. IV Contrast: 80 mL of iohexol (OMNIPAQUE) FINDINGS: --CHEST RADIOGRAPH-- Mild prominence of the cardiomediastinal silhouette even when accounting for portable technique. Pulmonary vasculature is engorged and indistinct. Generalized increase in peripheral reticular lung markings (Kerley B lines). Patchy central-predominant opacities in both lungs. No radiographic effusions evident, though present on subsequent CT. No pneumothorax. Bones are unremarkable. --PULMONARY CTA-- PULMONARY ARTERIAL TREE: Central pulmonary arteries are normal in caliber. No pulmonary emboli demonstrated. \"Supine cephalization\" with disproportionate prominence of the anterior pulmonary vasculature. LUNGS: Reticular interlobular septal thickening and central-predominant symmetric groundglass opacities in keeping with interstitial and alveolar edema, respectively. No focal pneumonic consolidation. Mild diffuse airway thickening also attributable to third spacing. No concerning endobronchial lesions. No suspicious pulmonary nodules or masses. PLEURA: Moderate bilateral layering pleural effusions without loculated components. HEART/GREAT VESSELS: Mild four-chamber enlargement of the heart. No pericardial effusion. No thoracic aortic aneurysm. MEDIASTINUM AND ESTELA: Unremarkable. CHEST WALL AND LOWER NECK: Unremarkable. VISUALIZED STRUCTURES IN THE UPPER ABDOMEN: Question gallstone at the " "proximal body on lower-most image without visualized findings concerning for cholecystitis. No acute findings in the imaged portion of the upper abdomen. OSSEOUS STRUCTURES: Old, healed posterior left rib fractures. No acute fractures or destructive osseous lesions.     Impression: 1.  Constellation of findings suggesting volume overload with bilateral moderate pleural effusions, pulmonary vascular congestion, and interstitial and alveolar edema. 2.  No pulmonary emboli. Workstation performed: [id number]     XR chest 1 view portable  Result Date: 11/13/2024  Narrative: XR CHEST PORTABLE INDICATION: cough. COMPARISON: Chest radiograph December 2, 2019 FINDINGS: New bilateral perihilar and peripheral reticular opacities with more focal opacity in the right lower lung. No pneumothorax or pleural effusion. Normal cardiomediastinal silhouette. Chronic right rib fractures. No acute osseous abnormality. Normal upper abdomen.     Impression: Pulmonary edema and/or pneumonia. Patient's smoking history is noted, and follow-up noncontrast chest is advised in 3 months. The study was marked in EPIC for immediate notification. Workstation performed: [id number]       Review of Systems   Constitutional: Negative for chills, diaphoresis, fever, malaise/fatigue and weight gain.   Cardiovascular:  Negative for chest pain, dyspnea on exertion, irregular heartbeat, leg swelling, near-syncope, orthopnea, palpitations, paroxysmal nocturnal dyspnea and syncope.   Respiratory:  Negative for cough, shortness of breath, sleep disturbances due to breathing, snoring and wheezing.    Skin:  Negative for rash.   Gastrointestinal:  Negative for bloating, abdominal pain and nausea.   Neurological:  Negative for dizziness and light-headedness.       Vitals:    07/07/25 0842   BP: 122/80   Pulse: 90         Vitals:    07/07/25 0842   Weight: 68.1 kg (150 lb 3.2 oz)         Height: 5' 5\" (165.1 cm)   Body mass index is 24.99 kg/m².    Physical " Exam  Vitals and nursing note reviewed.   Constitutional:       General: He is not in acute distress.     Appearance: Normal appearance. He is not ill-appearing.   HENT:      Head: Normocephalic and atraumatic.      Nose: Nose normal.      Mouth/Throat:      Mouth: Mucous membranes are moist.     Eyes:      Conjunctiva/sclera: Conjunctivae normal.       Cardiovascular:      Rate and Rhythm: Normal rate and regular rhythm.      Pulses:           Radial pulses are 2+ on the right side and 2+ on the left side.      Heart sounds: S1 normal and S2 normal. Murmur heard.      Systolic murmur is present with a grade of 2/6.   Pulmonary:      Effort: Pulmonary effort is normal. No respiratory distress.      Breath sounds: Normal breath sounds. No stridor. No wheezing, rhonchi or rales.   Abdominal:      General: There is no distension.     Musculoskeletal:      Cervical back: Neck supple.      Right lower leg: No edema.      Left lower leg: No edema.     Skin:     General: Skin is warm.      Capillary Refill: Capillary refill takes less than 2 seconds.     Neurological:      General: No focal deficit present.      Mental Status: He is alert.     Psychiatric:         Thought Content: Thought content normal.

## 2025-07-01 ENCOUNTER — TELEPHONE (OUTPATIENT)
Dept: FAMILY MEDICINE CLINIC | Facility: CLINIC | Age: 70
End: 2025-07-01

## 2025-07-01 DIAGNOSIS — J30.2 SEASONAL ALLERGIES: ICD-10-CM

## 2025-07-01 RX ORDER — FLUTICASONE PROPIONATE 50 MCG
1 SPRAY, SUSPENSION (ML) NASAL DAILY
Qty: 15.8 ML | Refills: 0 | Status: SHIPPED | OUTPATIENT
Start: 2025-07-01

## 2025-07-01 NOTE — TELEPHONE ENCOUNTER
Patient came in and requested refill of following medication.  Next ov with dr. Babcock 8/4.      fluticasone (FLONASE) 50 mcg/act nasal spray

## 2025-07-03 ENCOUNTER — APPOINTMENT (OUTPATIENT)
Dept: LAB | Facility: CLINIC | Age: 70
End: 2025-07-03
Payer: COMMERCIAL

## 2025-07-03 DIAGNOSIS — R39.89 SUSPECTED UTI: ICD-10-CM

## 2025-07-03 DIAGNOSIS — R97.20 ELEVATED PSA: ICD-10-CM

## 2025-07-03 DIAGNOSIS — Z01.812 PRE-OPERATIVE LABORATORY EXAMINATION: ICD-10-CM

## 2025-07-07 ENCOUNTER — APPOINTMENT (OUTPATIENT)
Dept: LAB | Facility: HOSPITAL | Age: 70
End: 2025-07-07
Payer: COMMERCIAL

## 2025-07-07 ENCOUNTER — OFFICE VISIT (OUTPATIENT)
Dept: CARDIOLOGY CLINIC | Facility: CLINIC | Age: 70
End: 2025-07-07
Payer: COMMERCIAL

## 2025-07-07 VITALS
SYSTOLIC BLOOD PRESSURE: 122 MMHG | HEART RATE: 90 BPM | WEIGHT: 150.2 LBS | HEIGHT: 65 IN | BODY MASS INDEX: 25.02 KG/M2 | DIASTOLIC BLOOD PRESSURE: 80 MMHG

## 2025-07-07 DIAGNOSIS — E11.9 TYPE 2 DIABETES MELLITUS WITHOUT COMPLICATION, WITHOUT LONG-TERM CURRENT USE OF INSULIN (HCC): ICD-10-CM

## 2025-07-07 DIAGNOSIS — E78.2 MIXED HYPERLIPIDEMIA: ICD-10-CM

## 2025-07-07 DIAGNOSIS — I50.42 CHRONIC COMBINED SYSTOLIC AND DIASTOLIC CONGESTIVE HEART FAILURE (HCC): ICD-10-CM

## 2025-07-07 DIAGNOSIS — I10 PRIMARY HYPERTENSION: Primary | ICD-10-CM

## 2025-07-07 LAB
ALBUMIN SERPL BCG-MCNC: 4.1 G/DL (ref 3.5–5)
ALP SERPL-CCNC: 110 U/L (ref 34–104)
ALT SERPL W P-5'-P-CCNC: 30 U/L (ref 7–52)
ANION GAP SERPL CALCULATED.3IONS-SCNC: 7 MMOL/L (ref 4–13)
AST SERPL W P-5'-P-CCNC: 40 U/L (ref 13–39)
BASOPHILS # BLD AUTO: 0.03 THOUSANDS/ÂΜL (ref 0–0.1)
BASOPHILS NFR BLD AUTO: 1 % (ref 0–1)
BILIRUB SERPL-MCNC: 0.63 MG/DL (ref 0.2–1)
BUN SERPL-MCNC: 7 MG/DL (ref 5–25)
CALCIUM SERPL-MCNC: 9.6 MG/DL (ref 8.4–10.2)
CHLORIDE SERPL-SCNC: 99 MMOL/L (ref 96–108)
CO2 SERPL-SCNC: 28 MMOL/L (ref 21–32)
CREAT SERPL-MCNC: 0.74 MG/DL (ref 0.6–1.3)
EOSINOPHIL # BLD AUTO: 0.5 THOUSAND/ÂΜL (ref 0–0.61)
EOSINOPHIL NFR BLD AUTO: 8 % (ref 0–6)
ERYTHROCYTE [DISTWIDTH] IN BLOOD BY AUTOMATED COUNT: 12.5 % (ref 11.6–15.1)
GFR SERPL CREATININE-BSD FRML MDRD: 94 ML/MIN/1.73SQ M
GLUCOSE P FAST SERPL-MCNC: 119 MG/DL (ref 65–99)
HCT VFR BLD AUTO: 39.8 % (ref 36.5–49.3)
HGB BLD-MCNC: 13.7 G/DL (ref 12–17)
IMM GRANULOCYTES # BLD AUTO: 0.02 THOUSAND/UL (ref 0–0.2)
IMM GRANULOCYTES NFR BLD AUTO: 0 % (ref 0–2)
LYMPHOCYTES # BLD AUTO: 1.73 THOUSANDS/ÂΜL (ref 0.6–4.47)
LYMPHOCYTES NFR BLD AUTO: 29 % (ref 14–44)
MCH RBC QN AUTO: 29.8 PG (ref 26.8–34.3)
MCHC RBC AUTO-ENTMCNC: 34.4 G/DL (ref 31.4–37.4)
MCV RBC AUTO: 87 FL (ref 82–98)
MONOCYTES # BLD AUTO: 0.7 THOUSAND/ÂΜL (ref 0.17–1.22)
MONOCYTES NFR BLD AUTO: 12 % (ref 4–12)
NEUTROPHILS # BLD AUTO: 2.95 THOUSANDS/ÂΜL (ref 1.85–7.62)
NEUTS SEG NFR BLD AUTO: 50 % (ref 43–75)
NRBC BLD AUTO-RTO: 0 /100 WBCS
PLATELET # BLD AUTO: 235 THOUSANDS/UL (ref 149–390)
PMV BLD AUTO: 9.8 FL (ref 8.9–12.7)
POTASSIUM SERPL-SCNC: 3.7 MMOL/L (ref 3.5–5.3)
PROT SERPL-MCNC: 7.5 G/DL (ref 6.4–8.4)
RBC # BLD AUTO: 4.59 MILLION/UL (ref 3.88–5.62)
SODIUM SERPL-SCNC: 134 MMOL/L (ref 135–147)
WBC # BLD AUTO: 5.93 THOUSAND/UL (ref 4.31–10.16)

## 2025-07-07 PROCEDURE — 85025 COMPLETE CBC W/AUTO DIFF WBC: CPT

## 2025-07-07 PROCEDURE — 80053 COMPREHEN METABOLIC PANEL: CPT

## 2025-07-07 PROCEDURE — 36415 COLL VENOUS BLD VENIPUNCTURE: CPT

## 2025-07-07 PROCEDURE — G2211 COMPLEX E/M VISIT ADD ON: HCPCS

## 2025-07-07 PROCEDURE — 99214 OFFICE O/P EST MOD 30 MIN: CPT

## 2025-07-07 PROCEDURE — 87086 URINE CULTURE/COLONY COUNT: CPT

## 2025-07-07 RX ORDER — ATORVASTATIN CALCIUM 40 MG/1
40 TABLET, FILM COATED ORAL
Qty: 90 TABLET | Refills: 3 | Status: SHIPPED | OUTPATIENT
Start: 2025-07-07

## 2025-07-07 RX ORDER — CARVEDILOL 6.25 MG/1
6.25 TABLET ORAL 2 TIMES DAILY WITH MEALS
Qty: 180 TABLET | Refills: 3 | Status: SHIPPED | OUTPATIENT
Start: 2025-07-07

## 2025-07-07 RX ORDER — SACUBITRIL AND VALSARTAN 24; 26 MG/1; MG/1
1 TABLET, FILM COATED ORAL 2 TIMES DAILY
Qty: 180 TABLET | Refills: 3 | Status: SHIPPED | OUTPATIENT
Start: 2025-07-07

## 2025-07-08 ENCOUNTER — TELEPHONE (OUTPATIENT)
Dept: GASTROENTEROLOGY | Facility: MEDICAL CENTER | Age: 70
End: 2025-07-08

## 2025-07-08 LAB — BACTERIA UR CULT: NORMAL

## 2025-07-08 NOTE — TELEPHONE ENCOUNTER
Left voicemail and requested call back     Called pt to confirm appointment for 07/09 at 8:20 AM, asked pt to please give us a call to confirm.

## 2025-07-09 ENCOUNTER — TELEPHONE (OUTPATIENT)
Dept: GASTROENTEROLOGY | Facility: MEDICAL CENTER | Age: 70
End: 2025-07-09

## 2025-07-09 ENCOUNTER — OFFICE VISIT (OUTPATIENT)
Dept: GASTROENTEROLOGY | Facility: MEDICAL CENTER | Age: 70
End: 2025-07-09
Payer: COMMERCIAL

## 2025-07-09 VITALS
HEART RATE: 78 BPM | TEMPERATURE: 96.9 F | SYSTOLIC BLOOD PRESSURE: 110 MMHG | BODY MASS INDEX: 25.19 KG/M2 | WEIGHT: 151.4 LBS | DIASTOLIC BLOOD PRESSURE: 74 MMHG

## 2025-07-09 DIAGNOSIS — Z86.0100 HISTORY OF COLON POLYPS: ICD-10-CM

## 2025-07-09 PROCEDURE — 99214 OFFICE O/P EST MOD 30 MIN: CPT | Performed by: STUDENT IN AN ORGANIZED HEALTH CARE EDUCATION/TRAINING PROGRAM

## 2025-07-09 RX ORDER — POLYETHYLENE GLYCOL 3350, SODIUM SULFATE ANHYDROUS, SODIUM BICARBONATE, SODIUM CHLORIDE, POTASSIUM CHLORIDE 236; 22.74; 6.74; 5.86; 2.97 G/4L; G/4L; G/4L; G/4L; G/4L
4000 POWDER, FOR SOLUTION ORAL ONCE
Qty: 4000 ML | Refills: 0 | Status: SHIPPED | OUTPATIENT
Start: 2025-07-09 | End: 2025-07-09

## 2025-07-09 RX ORDER — SODIUM CHLORIDE, SODIUM LACTATE, POTASSIUM CHLORIDE, CALCIUM CHLORIDE 600; 310; 30; 20 MG/100ML; MG/100ML; MG/100ML; MG/100ML
125 INJECTION, SOLUTION INTRAVENOUS CONTINUOUS
OUTPATIENT
Start: 2025-07-21

## 2025-07-09 NOTE — PROGRESS NOTES
Name: Ruben Flores      : 1955      MRN: [id number]  Encounter Provider: Jose Juan Santiago MD  Encounter Date: 2025   Encounter department: [facility] [place]  :  Assessment & Plan  History of colon polyps  Patient had numerous adenomatous polyps in  with sub-optimal bowel prep. Patient is due for surveillance colonoscopy. Risks and benefits were discussed.    - Colonoscopy, 2-day Golytely bowel prep  - Cardiology clearance    Orders:    Ambulatory Referral to Gastroenterology    Colonoscopy; Future    polyethylene glycol (Golytely) 4000 mL solution; Take 4,000 mL by mouth once for 1 dose Take 4000 mL by mouth once for 1 dose. Use as directed      Assessment & Plan        History of Present Illness   Ruben Flores is a 69 y.o. [place]ijani-speaking male w/ hx of combined systolic and diastolic heart failure (EF 39%), hypertension, hyperlipidemia, and diabetes who presents for prior colon polyps.    Patient denies abdominal pain, diarrhea, constipation, hematochezia, melena, or unintentional weight loss. No family history of colon cancer.     Colonoscopy 2024 was notable for 9 small TA and fair prep.     History of Present Illness      Review of Systems A complete review of systems is negative other than that noted above in the HPI.      Current Medications[1]  Objective   /74   Pulse 78   Temp (!) 96.9 °F (36.1 °C)   Wt 68.7 kg (151 lb 6.4 oz)   BMI 25.19 kg/m²     Physical Exam   General: No acute distress  Abdomen: Soft, non-tender, non-distended, normoactive bowel sounds  Extremities: No lower extremity edema  Neuro: Awake, alert, oriented x 3    Physical Exam      Results    Lab Results: I personally reviewed relevant lab results.       Results for orders placed during the hospital encounter of 24    Colonoscopy    Impression  The terminal ileum appeared normal.  10 subcentimeter polyps were removed with cold snare  Diverticulosis of  moderate severity in the sigmoid colon        RECOMMENDATION:  Repeat colonoscopy in 1 year  Personal history of colon polyps    Recommend Golytely bowel prep in the future given fair prep.            Jose Juan Santiago MD             [1]   Current Outpatient Medications   Medication Sig Dispense Refill    aspirin (Aspirin Low Dose) 81 mg EC tablet Take 1 tablet (81 mg total) by mouth daily 90 tablet 3    atorvastatin (LIPITOR) 40 mg tablet Take 1 tablet (40 mg total) by mouth daily at bedtime 90 tablet 3    carvedilol (COREG) 6.25 mg tablet Take 1 tablet (6.25 mg total) by mouth 2 (two) times a day with meals 180 tablet 3    cetirizine (ZyrTEC) 10 mg tablet Take 1 tablet (10 mg total) by mouth daily 90 tablet 1    Empagliflozin (Jardiance) 10 MG TABS tablet Take 1 tablet (10 mg total) by mouth every morning 90 tablet 3    polyethylene glycol (Golytely) 4000 mL solution Take 4,000 mL by mouth once for 1 dose Take 4000 mL by mouth once for 1 dose. Use as directed 4000 mL 0    sacubitril-valsartan (ENTRESTO) 24-26 MG TABS Take 1 tablet by mouth 2 (two) times a day 180 tablet 3    tamsulosin (FLOMAX) 0.4 mg Take 1 capsule (0.4 mg total) by mouth daily with dinner 90 capsule 1    fluticasone (FLONASE) 50 mcg/act nasal spray 1 spray into each nostril daily 15.8 mL 0    melatonin 3 mg Take 1 tablet (3 mg total) by mouth daily at bedtime (Patient not taking: Reported on 7/7/2025) 15 tablet 0    nicotine (NICODERM CQ) 21 mg/24 hr TD 24 hr patch Place 1 patch on the skin over 24 hours every 24 hours 30 patch 3     No current facility-administered medications for this visit.

## 2025-07-09 NOTE — TELEPHONE ENCOUNTER
Our mutual patient, Ruben Flores, is scheduled for the following   procedure(s): Colonoscopy              On: 09/15/2025              With: Dr. Santiago    Our office is requesting cardiology clearance for the upcoming procedure(s).        Thank you,  [facility]

## 2025-07-10 ENCOUNTER — ANESTHESIA EVENT (OUTPATIENT)
Dept: PERIOP | Facility: HOSPITAL | Age: 70
End: 2025-07-10
Payer: COMMERCIAL

## 2025-07-18 PROCEDURE — NC001 PR NO CHARGE: Performed by: UROLOGY

## 2025-07-18 NOTE — H&P
HISTORY AND PHYSICAL  ?  ?  Patient Name: Ruben Flores  Patient MRN: [id number]  Attending Provider: Burak Wadsworth MD  Service: Urology  Chief Complaint    Elevated PSA = 7.5-2, Abnormal MRI Prostate-32 g PI-RADS 4,     HPI   Ruben Flores is a 69 y.o. male with the above complaint  I plan ultrasound guided transperineal prostate biopsies with/ without fusion.  The potential risks and complications have been  discussed, and informed consent was given by the patient.  Medications  Meds/Allergies   No current facility-administered medications for this encounter.      Cannot display prior to admission medications because the patient has not been admitted in this contact.     Current Medications[1]  Review of Systems  10 point review of systems negative except as noted in HPI  Allergies  Allergies[2]  PMH  Past Medical History[3]  Past surgical history  Past Surgical History[4]  Social history  Social History[5]  ?  Physical Exam        Normocephalic/Atraumatic  Neck- supple  Lungs- normal respiratory effort  CV- RRR  Abdomen- soft, NT  Extremities- no edema  Neuro- Nonfocal          Burak Wadsworth MD        [1] No current facility-administered medications for this encounter.    Current Outpatient Medications:     aspirin (Aspirin Low Dose) 81 mg EC tablet, Take 1 tablet (81 mg total) by mouth daily, Disp: 90 tablet, Rfl: 3    atorvastatin (LIPITOR) 40 mg tablet, Take 1 tablet (40 mg total) by mouth daily at bedtime (Patient taking differently: Take 40 mg by mouth in the morning.), Disp: 90 tablet, Rfl: 3    carvedilol (COREG) 6.25 mg tablet, Take 1 tablet (6.25 mg total) by mouth 2 (two) times a day with meals, Disp: 180 tablet, Rfl: 3    cetirizine (ZyrTEC) 10 mg tablet, Take 1 tablet (10 mg total) by mouth daily, Disp: 90 tablet, Rfl: 1    Empagliflozin (Jardiance) 10 MG TABS tablet, Take 1 tablet (10 mg total) by mouth every morning, Disp: 90 tablet, Rfl: 3    fluticasone (FLONASE) 50 mcg/act  nasal spray, 1 spray into each nostril daily, Disp: 15.8 mL, Rfl: 0    sacubitril-valsartan (ENTRESTO) 24-26 MG TABS, Take 1 tablet by mouth 2 (two) times a day, Disp: 180 tablet, Rfl: 3    tamsulosin (FLOMAX) 0.4 mg, Take 1 capsule (0.4 mg total) by mouth daily with dinner, Disp: 90 capsule, Rfl: 1    melatonin 3 mg, Take 1 tablet (3 mg total) by mouth daily at bedtime (Patient not taking: Reported on 7/7/2025), Disp: 15 tablet, Rfl: 0    nicotine (NICODERM CQ) 21 mg/24 hr TD 24 hr patch, Place 1 patch on the skin over 24 hours every 24 hours, Disp: 30 patch, Rfl: 3    polyethylene glycol (Golytely) 4000 mL solution, Take 4,000 mL by mouth once for 1 dose Take 4000 mL by mouth once for 1 dose. Use as directed, Disp: 4000 mL, Rfl: 0  [2]   Allergies  Allergen Reactions    Pollen Extract Itching    Ioversol Rash   [3]   Past Medical History:  Diagnosis Date    Colon polyp     Diabetes mellitus (HCC)     HLD (hyperlipidemia)     Hypertension    [4]   Past Surgical History:  Procedure Laterality Date    COLONOSCOPY     [5]   Social History  Tobacco Use    Smoking status: Former     Types: Cigarettes     Start date: 2024     Passive exposure: Current    Smokeless tobacco: Former   Vaping Use    Vaping status: Former    Substances: Flavoring   Substance Use Topics    Alcohol use: Not Currently     Alcohol/week: 2.0 standard drinks of alcohol     Types: 2 Cans of beer per week    Drug use: Not Currently     Comment: past use of Cocaine

## 2025-07-18 NOTE — PRE-PROCEDURE INSTRUCTIONS
Pre-Surgery Instructions:   Medication Instructions    aspirin (Aspirin Low Dose) 81 mg EC tablet Stop taking 7 days prior to surgery.    atorvastatin (LIPITOR) 40 mg tablet Take day of surgery.    carvedilol (COREG) 6.25 mg tablet Take day of surgery.    cetirizine (ZyrTEC) 10 mg tablet Take day of surgery.    Empagliflozin (Jardiance) 10 MG TABS tablet Stop taking 4 days prior to surgery.    fluticasone (FLONASE) 50 mcg/act nasal spray Take day of surgery.    sacubitril-valsartan (ENTRESTO) 24-26 MG TABS Take day of surgery.    tamsulosin (FLOMAX) 0.4 mg Take night before surgery   Patient was very distracted during assessment call. He said that everything is in his chart. Provided medication instructions, but unable to provide showering instructions. He is aware he needs CHG skin cleanser and needs an enema. Patient received a call on his line and left my call to take it and never returned.

## 2025-07-21 ENCOUNTER — ANESTHESIA (OUTPATIENT)
Dept: PERIOP | Facility: HOSPITAL | Age: 70
End: 2025-07-21
Payer: COMMERCIAL

## 2025-07-21 ENCOUNTER — HOSPITAL ENCOUNTER (OUTPATIENT)
Facility: HOSPITAL | Age: 70
Setting detail: OUTPATIENT SURGERY
Discharge: HOME | End: 2025-07-21
Attending: UROLOGY | Admitting: UROLOGY
Payer: COMMERCIAL

## 2025-07-21 VITALS
OXYGEN SATURATION: 97 % | WEIGHT: 156 LBS | BODY MASS INDEX: 25.99 KG/M2 | RESPIRATION RATE: 16 BRPM | TEMPERATURE: 97.3 F | HEIGHT: 65 IN | DIASTOLIC BLOOD PRESSURE: 87 MMHG | HEART RATE: 68 BPM | SYSTOLIC BLOOD PRESSURE: 115 MMHG

## 2025-07-21 DIAGNOSIS — R93.89 ABNORMAL MRI: ICD-10-CM

## 2025-07-21 LAB
GLUCOSE SERPL-MCNC: 125 MG/DL (ref 65–140)
GLUCOSE SERPL-MCNC: 146 MG/DL (ref 65–140)

## 2025-07-21 PROCEDURE — 76942 ECHO GUIDE FOR BIOPSY: CPT | Performed by: UROLOGY

## 2025-07-21 PROCEDURE — 88344 IMHCHEM/IMCYTCHM EA MLT ANTB: CPT | Performed by: PATHOLOGY

## 2025-07-21 PROCEDURE — G0416 PROSTATE BIOPSY, ANY MTHD: HCPCS | Performed by: PATHOLOGY

## 2025-07-21 PROCEDURE — 55700 PR PROSTATE NEEDLE BIOPSY ANY APPROACH: CPT | Performed by: UROLOGY

## 2025-07-21 PROCEDURE — 82948 REAGENT STRIP/BLOOD GLUCOSE: CPT

## 2025-07-21 RX ORDER — LIDOCAINE HYDROCHLORIDE 10 MG/ML
INJECTION, SOLUTION EPIDURAL; INFILTRATION; INTRACAUDAL; PERINEURAL AS NEEDED
Status: DISCONTINUED | OUTPATIENT
Start: 2025-07-21 | End: 2025-07-21

## 2025-07-21 RX ORDER — ACETAMINOPHEN 325 MG/1
975 TABLET ORAL ONCE
Status: COMPLETED | OUTPATIENT
Start: 2025-07-21 | End: 2025-07-21

## 2025-07-21 RX ORDER — SODIUM CHLORIDE, SODIUM LACTATE, POTASSIUM CHLORIDE, CALCIUM CHLORIDE 600; 310; 30; 20 MG/100ML; MG/100ML; MG/100ML; MG/100ML
125 INJECTION, SOLUTION INTRAVENOUS CONTINUOUS
OUTPATIENT
Start: 2025-07-21

## 2025-07-21 RX ORDER — FENTANYL CITRATE 50 UG/ML
INJECTION, SOLUTION INTRAMUSCULAR; INTRAVENOUS AS NEEDED
Status: DISCONTINUED | OUTPATIENT
Start: 2025-07-21 | End: 2025-07-21

## 2025-07-21 RX ORDER — PROPOFOL 10 MG/ML
INJECTION, EMULSION INTRAVENOUS AS NEEDED
Status: DISCONTINUED | OUTPATIENT
Start: 2025-07-21 | End: 2025-07-21

## 2025-07-21 RX ORDER — SODIUM CHLORIDE, SODIUM LACTATE, POTASSIUM CHLORIDE, CALCIUM CHLORIDE 600; 310; 30; 20 MG/100ML; MG/100ML; MG/100ML; MG/100ML
125 INJECTION, SOLUTION INTRAVENOUS CONTINUOUS
Status: DISCONTINUED | OUTPATIENT
Start: 2025-07-21 | End: 2025-07-21 | Stop reason: HOSPADM

## 2025-07-21 RX ORDER — ONDANSETRON 2 MG/ML
INJECTION INTRAMUSCULAR; INTRAVENOUS AS NEEDED
Status: DISCONTINUED | OUTPATIENT
Start: 2025-07-21 | End: 2025-07-21

## 2025-07-21 RX ORDER — CEFAZOLIN SODIUM 2 G/50ML
2000 SOLUTION INTRAVENOUS ONCE
Status: COMPLETED | OUTPATIENT
Start: 2025-07-21 | End: 2025-07-21

## 2025-07-21 RX ADMIN — ACETAMINOPHEN 975 MG: 325 TABLET, FILM COATED ORAL at 12:34

## 2025-07-21 RX ADMIN — LIDOCAINE HYDROCHLORIDE 30 MG: 10 INJECTION, SOLUTION EPIDURAL; INFILTRATION; INTRACAUDAL; PERINEURAL at 11:29

## 2025-07-21 RX ADMIN — FENTANYL CITRATE 25 MCG: 50 INJECTION, SOLUTION INTRAMUSCULAR; INTRAVENOUS at 11:36

## 2025-07-21 RX ADMIN — PROPOFOL 50 MCG/KG/MIN: 10 INJECTION, EMULSION INTRAVENOUS at 11:30

## 2025-07-21 RX ADMIN — SODIUM CHLORIDE, SODIUM LACTATE, POTASSIUM CHLORIDE, AND CALCIUM CHLORIDE: .6; .31; .03; .02 INJECTION, SOLUTION INTRAVENOUS at 10:52

## 2025-07-21 RX ADMIN — FENTANYL CITRATE 25 MCG: 50 INJECTION, SOLUTION INTRAMUSCULAR; INTRAVENOUS at 11:25

## 2025-07-21 RX ADMIN — ONDANSETRON 4 MG: 2 INJECTION INTRAMUSCULAR; INTRAVENOUS at 11:32

## 2025-07-21 RX ADMIN — CEFAZOLIN SODIUM 2000 MG: 2 SOLUTION INTRAVENOUS at 11:24

## 2025-07-21 RX ADMIN — FENTANYL CITRATE 25 MCG: 50 INJECTION, SOLUTION INTRAMUSCULAR; INTRAVENOUS at 11:30

## 2025-07-21 RX ADMIN — PROPOFOL 50 MG: 10 INJECTION, EMULSION INTRAVENOUS at 11:37

## 2025-07-21 RX ADMIN — PROPOFOL 50 MG: 10 INJECTION, EMULSION INTRAVENOUS at 11:29

## 2025-07-21 RX ADMIN — FENTANYL CITRATE 25 MCG: 50 INJECTION, SOLUTION INTRAMUSCULAR; INTRAVENOUS at 11:33

## 2025-07-21 NOTE — ANESTHESIA PREPROCEDURE EVALUATION
Procedure:  TRANSPERINEAL MRI FUSION BIOPSY PROSTATE (Perineum)    Relevant Problems   CARDIO   (+) Chronic combined systolic and diastolic congestive heart failure (HCC)   (+) Mixed hyperlipidemia   (+) Primary hypertension      ENDO   (+) Type 2 diabetes mellitus without complication, without long-term current use of insulin (HCC)      Behavioral Health   (+) Tobacco user      Urinary   (+) Elevated PSA   Per cardiology: 69 y/o male with a PMH of chronic combined CHF (etiology NICM), HTN, HLD, and tobacco use who is presenting today for routine follow up. He was last seen in office 04/07/25 by Dr. Garrison. At this visit, he was doing overall well and euvolemic on exam.      Left ventricular cavity size is mildly dilated. Wall thickness is normal. The left ventricular ejection fraction is 39%. Systolic function is moderately reduced. Global longitudinal strain is reduced at -10.8%.  There is moderate global hypokinesis. Diastolic function is mildly abnormal, consistent with grade I (abnormal) relaxation.     Quit smoking  Physical Exam    Airway     Mallampati score: II  TM Distance: >3 FB  Neck ROM: full      Cardiovascular  Cardiovascular exam normal    Dental   Comment: Full dentures     Pulmonary  Pulmonary exam normal     Neurological    He appears awake, alert and oriented x3.      Other Findings        Anesthesia Plan  ASA Score- 3     Anesthesia Type- IV sedation with anesthesia with ASA Monitors.         Additional Monitors:     Airway Plan:            Plan Factors-Exercise tolerance (METS): >4 METS.    Chart reviewed.   Existing labs reviewed. Patient summary reviewed.    Patient is not a current smoker.              Induction- intravenous.    Postoperative Plan- .   Monitoring Plan - Monitoring plan - standard ASA monitoring          Informed Consent- Anesthetic plan and risks discussed with patient.  I personally reviewed this patient with the CRNA. Discussed and agreed on the Anesthesia Plan with the  CRNA..      NPO Status:  Vitals Value Taken Time   Date of last liquid 07/20/25 07/21/25 08:52   Time of last liquid 1900 07/21/25 08:52   Date of last solid 07/20/25 07/21/25 08:52   Time of last solid 1900 07/21/25 08:52

## 2025-07-21 NOTE — ANESTHESIA POSTPROCEDURE EVALUATION
Post-Op Assessment Note    CV Status:  Stable    Pain management: adequate       Mental Status:  Alert and awake   Hydration Status:  Euvolemic   PONV Controlled:  Controlled   Airway Patency:  Patent     Post Op Vitals Reviewed: Yes    No anethesia notable event occurred.            Last Filed PACU Vitals:  Vitals Value Taken Time   Temp 97.1 °F (36.2 °C) 07/21/25 12:19   Pulse 78 07/21/25 12:19   /65 07/21/25 12:19   Resp 18 07/21/25 12:19   SpO2 97 % 07/21/25 12:19       Modified Tina:     Vitals Value Taken Time   Activity 2 07/21/25 12:19   Respiration 2 07/21/25 12:19   Circulation 2 07/21/25 12:19   Consciousness 2 07/21/25 12:19   Oxygen Saturation 2 07/21/25 12:19     Modified Tina Score: 10

## 2025-07-21 NOTE — ANESTHESIA POSTPROCEDURE EVALUATION
Post-Op Assessment Note    CV Status:  Stable  Pain Score: 0    Pain management: adequate       Mental Status:  Arousable   Hydration Status:  Stable   PONV Controlled:  None   Airway Patency:  Patent     Post Op Vitals Reviewed: Yes    No anethesia notable event occurred.    Staff: CRNA           Last Filed PACU Vitals:  Vitals Value Taken Time   Temp 97.4 °F (36.3 °C) 07/21/25 12:00   Pulse 69 07/21/25 12:00   /74    Resp 16 07/21/25 12:00   SpO2 98 % 07/21/25 12:00

## 2025-07-21 NOTE — INTERVAL H&P NOTE
H&P reviewed. After examining the patient I find no changes in the patients condition since the H&P had been written.    Vitals:    07/21/25 0905   BP: 143/82   Pulse: 75   Resp: 16   Temp: (!) 97.2 °F (36.2 °C)   SpO2: 97%   Procedure and risks discussed with patient in the holding unit.  used. Consent signed.

## 2025-07-21 NOTE — OP NOTE
OPERATIVE REPORT  PATIENT NAME: Ruben Flores    :  1955  MRN: [id number]  Pt Location:  OR ROOM 12    SURGERY DATE: 2025    Surgeons and Role:     * Burak Wadsworth MD - Primary     * Aston Chopra DO - Co-surgeon    Preop Diagnosis:  Abnormal MRI [R93.89]    Post-Op Diagnosis Codes:     * Abnormal MRI [R93.89]     * Elevated PSA [R97.20]    Procedure(s):  TRANSPERINEAL MRI FUSION BIOPSY PROSTATE    Specimen(s):  ID Type Source Tests Collected by Time Destination   1 : REGION OF INTEREST Tissue Prostate TISSUE EXAM Burak Wadsworth MD 2025 1138    2 : LEFT POSTERIOR LATERAL Tissue Prostate TISSUE EXAM Burak Wadsworth MD 2025 1138    3 : LEFT POSTERIOR MEDIAL Tissue Prostate TISSUE EXAM Burak Wadsworth MD 2025 1138    4 : LEFT BASE Tissue Prostate TISSUE EXAM Burak Wadsworth MD 2025 1138    5 : RIGHT POSTERIOR LATERAL Tissue Prostate TISSUE EXAM Burak Wadsworth MD 2025 1138    6 : RIGHT POSTERIOR MEDIAL Tissue Prostate TISSUE EXAM Burak Wadsworth MD 2025 1138    7 : RIGHT BASE Tissue Prostate TISSUE EXAM Bruak Wadsworth MD 2025 1138    8 : RIGHT ANTERIOR LATERAL Tissue Prostate TISSUE EXAM Burak Wadsworth MD 2025 1138    A : RIGHT ANTERIOR MEDIAL Tissue Prostate  Burak Wadsworth MD 2025 1138    B : LEFT ANTERIOR MEDIAL Tissue Prostate  Burak Wadsworth MD 2025 1138    C : LEFT ANTERIOR LATERAL Tissue Prostate  Burak Wadsworth MD 2025 1138    D : RIGHT TRANSITIONAL ZONE Tissue Prostate  Burak Wadsworth MD 2025 1138    E : LEFT TRANSITIONAL ZONE Tissue Prostate  Burak Wadsworth MD 2025 1138        Estimated Blood Loss:   Minimal    Drains:  * No LDAs found *    Anesthesia Type:   MAC with local    Operative Indications:  Abnormal MRI [R93.89]  Elevated psa    Operative Findings:  The PIRADS 4 lesion noted on MRI was successfully targeted. Standard transperineal biopsies were then performed.        Complications:    None    Procedure and Technique:  The patient was brought to the operating room and properly identified.  Monitored anesthesia was then administered.  He was placed in lithotomy position, padded appropriately and prepped and draped in the usual sterile fashion.  The scrotum was taped up with Ioban.  compression boots were employed.  Intravenous antibiotic was administered by anesthesia.  An appropriate timeout was performed.    The transrectal ultrasound probe was then placed into the rectum and the prostate visualized.  The UroNav was employed.  A sweep was then performed to allow for fusion of the MRI images and the live ultrasound images.  Local anesthesia to the perineum, subcutaneous tissue and levator muscles was then administered utilizing 1% Xylocaine and 0.5% Marcaine.  The precision point access needle was placed into the subcutaneous tissue and visualized under ultrasound.  The Pirads 4 lesion noted on MRI was then targeted.  4 Biopsies of the lesion were performed.  The lesion was successfully targeted and tissue was adequate.  Once the abnormal lesion noted on MRI was successfully biopsied, standard transperineal biopsies were then performed to effect saturation biopsy of the prostate.  The precision point needle was moved to allow adequate sampling of the gland.  Multiple cores were taken of the anterior medial, anterior lateral, transition zone, posterior medial, posterior lateral and base of the prostate on both sides of the gland.  Approximately 26 biopsies were performed in all.  Saturation biopsy of the prostate was performed.    Once all biopsies were performed the precision point needle was removed from the perineum.  Pressure was held on the perineum for approximately 5 minutes to help with hemostasis.  Blood loss was minimal.  The patient was awakened from anesthesia.  He was then taken to the PACU in satisfactory condition.   I was present for the entire procedure.    Patient  Disposition:  PACU  and hemodynamically stable         SIGNATURE: Burak Wadsworth MD  DATE: July 21, 2025  TIME: 11:50 AM

## 2025-07-23 PROCEDURE — 88344 IMHCHEM/IMCYTCHM EA MLT ANTB: CPT | Performed by: PATHOLOGY

## 2025-07-23 PROCEDURE — G0416 PROSTATE BIOPSY, ANY MTHD: HCPCS | Performed by: PATHOLOGY

## 2025-07-30 ENCOUNTER — OFFICE VISIT (OUTPATIENT)
Dept: UROLOGY | Facility: AMBULATORY SURGERY CENTER | Age: 70
End: 2025-07-30
Payer: COMMERCIAL

## 2025-07-30 VITALS
DIASTOLIC BLOOD PRESSURE: 80 MMHG | BODY MASS INDEX: 25.49 KG/M2 | OXYGEN SATURATION: 98 % | SYSTOLIC BLOOD PRESSURE: 120 MMHG | HEIGHT: 65 IN | WEIGHT: 153 LBS | HEART RATE: 86 BPM

## 2025-07-30 DIAGNOSIS — C61 PROSTATE CANCER (HCC): Primary | ICD-10-CM

## 2025-07-30 PROCEDURE — 99215 OFFICE O/P EST HI 40 MIN: CPT | Performed by: UROLOGY

## 2025-07-30 RX ORDER — CEFAZOLIN SODIUM 2 G/50ML
2000 SOLUTION INTRAVENOUS ONCE
OUTPATIENT
Start: 2025-07-30 | End: 2025-07-30

## 2025-07-30 RX ORDER — SODIUM CHLORIDE 9 MG/ML
125 INJECTION, SOLUTION INTRAVENOUS CONTINUOUS
OUTPATIENT
Start: 2025-07-30

## 2025-07-31 ENCOUNTER — DOCUMENTATION (OUTPATIENT)
Dept: HEMATOLOGY ONCOLOGY | Facility: CLINIC | Age: 70
End: 2025-07-31

## 2025-07-31 ENCOUNTER — PATIENT OUTREACH (OUTPATIENT)
Dept: HEMATOLOGY ONCOLOGY | Facility: CLINIC | Age: 70
End: 2025-07-31

## 2025-08-04 ENCOUNTER — OFFICE VISIT (OUTPATIENT)
Dept: FAMILY MEDICINE CLINIC | Facility: CLINIC | Age: 70
End: 2025-08-04

## 2025-08-04 VITALS
SYSTOLIC BLOOD PRESSURE: 120 MMHG | OXYGEN SATURATION: 94 % | HEIGHT: 65 IN | WEIGHT: 152.5 LBS | DIASTOLIC BLOOD PRESSURE: 70 MMHG | TEMPERATURE: 96.3 F | BODY MASS INDEX: 25.41 KG/M2 | HEART RATE: 94 BPM | RESPIRATION RATE: 16 BRPM

## 2025-08-04 DIAGNOSIS — I10 PRIMARY HYPERTENSION: ICD-10-CM

## 2025-08-04 DIAGNOSIS — Z72.0 TOBACCO USER: ICD-10-CM

## 2025-08-04 DIAGNOSIS — I50.42 CHRONIC COMBINED SYSTOLIC AND DIASTOLIC CONGESTIVE HEART FAILURE (HCC): ICD-10-CM

## 2025-08-04 DIAGNOSIS — E11.9 TYPE 2 DIABETES MELLITUS WITHOUT COMPLICATION, WITHOUT LONG-TERM CURRENT USE OF INSULIN (HCC): Primary | ICD-10-CM

## 2025-08-04 LAB — SL AMB POCT HEMOGLOBIN AIC: 7.5 (ref ?–6.5)

## 2025-08-04 PROCEDURE — G2211 COMPLEX E/M VISIT ADD ON: HCPCS | Performed by: FAMILY MEDICINE

## 2025-08-04 PROCEDURE — 99213 OFFICE O/P EST LOW 20 MIN: CPT | Performed by: FAMILY MEDICINE

## 2025-08-04 PROCEDURE — 83036 HEMOGLOBIN GLYCOSYLATED A1C: CPT | Performed by: FAMILY MEDICINE

## 2025-08-04 RX ORDER — NICOTINE 21 MG/24HR
1 PATCH, TRANSDERMAL 24 HOURS TRANSDERMAL EVERY 24 HOURS
Qty: 30 PATCH | Refills: 3 | Status: SHIPPED | OUTPATIENT
Start: 2025-08-04 | End: 2025-09-03

## 2025-08-07 ENCOUNTER — TELEPHONE (OUTPATIENT)
Dept: UROLOGY | Facility: AMBULATORY SURGERY CENTER | Age: 70
End: 2025-08-07

## 2025-08-07 ENCOUNTER — PREP FOR PROCEDURE (OUTPATIENT)
Dept: UROLOGY | Facility: AMBULATORY SURGERY CENTER | Age: 70
End: 2025-08-07

## 2025-08-12 ENCOUNTER — TELEPHONE (OUTPATIENT)
Age: 70
End: 2025-08-12

## 2025-08-12 ENCOUNTER — PREP FOR PROCEDURE (OUTPATIENT)
Dept: UROLOGY | Facility: AMBULATORY SURGERY CENTER | Age: 70
End: 2025-08-12

## 2025-08-21 PROBLEM — C61 PROSTATE CANCER (HCC): Status: ACTIVE | Noted: 2025-07-21

## (undated) DEVICE — SYSTEM TRANSPERINEAL ACCESS PRECISIONPOINT

## (undated) DEVICE — Device

## (undated) DEVICE — STERILE POLYISOPRENE POWDER-FREE SURGICAL GLOVES: Brand: PROTEXIS

## (undated) DEVICE — SPECIMEN CONTAINER STERILE PEEL PACK

## (undated) DEVICE — 3M™ IOBAN™ 2 ANTIMICROBIAL INCISE DRAPE 6650EZ: Brand: IOBAN™ 2

## (undated) DEVICE — TELFA NON-ADHERENT ABSORBENT DRESSING: Brand: TELFA

## (undated) DEVICE — HOLDER PROBE URONAV BK8848

## (undated) DEVICE — SKIN MARKER DUAL TIP WITH RULER CAP, FLEXIBLE RULER AND LABELS: Brand: DEVON

## (undated) DEVICE — MAX-CORE® DISPOSABLE CORE BIOPSY INSTRUMENT, 18G X 25CM: Brand: MAX-CORE

## (undated) DEVICE — USED IN CONJUNCTION WITH A SYRINGE AS AN ADDITIVE DEVICE FOR ASPIRATION FROM MULTI-DOSE MEDICINE VIALS OR INJECTION INTO I.V. SYSTEMS AND PRE-SLIT SEPTUMS COVERING INJECTION SITES.: Brand: SOL-M™ BLUNT FILL NEEDLE